# Patient Record
Sex: FEMALE | Race: BLACK OR AFRICAN AMERICAN | NOT HISPANIC OR LATINO | Employment: PART TIME | URBAN - NONMETROPOLITAN AREA
[De-identification: names, ages, dates, MRNs, and addresses within clinical notes are randomized per-mention and may not be internally consistent; named-entity substitution may affect disease eponyms.]

---

## 2017-07-14 ENCOUNTER — LAB (OUTPATIENT)
Dept: LAB | Facility: HOSPITAL | Age: 22
End: 2017-07-14

## 2017-07-14 ENCOUNTER — TRANSCRIBE ORDERS (OUTPATIENT)
Dept: LAB | Facility: HOSPITAL | Age: 22
End: 2017-07-14

## 2017-07-14 DIAGNOSIS — J02.9 ACUTE PHARYNGITIS, UNSPECIFIED ETIOLOGY: Primary | ICD-10-CM

## 2017-07-14 DIAGNOSIS — J02.9 ACUTE PHARYNGITIS, UNSPECIFIED ETIOLOGY: ICD-10-CM

## 2017-07-14 LAB
BASOPHILS # BLD AUTO: 0.02 10*3/MM3 (ref 0–0.2)
BASOPHILS NFR BLD AUTO: 0.2 % (ref 0–2)
DEPRECATED RDW RBC AUTO: 39 FL (ref 36.4–46.3)
EOSINOPHIL # BLD AUTO: 0.02 10*3/MM3 (ref 0–0.7)
EOSINOPHIL NFR BLD AUTO: 0.2 % (ref 0–7)
ERYTHROCYTE [DISTWIDTH] IN BLOOD BY AUTOMATED COUNT: 12.1 % (ref 11.5–14.5)
HCT VFR BLD AUTO: 35.4 % (ref 35–45)
HETEROPH AB SER QL LA: NEGATIVE
HGB BLD-MCNC: 11.9 G/DL (ref 12–15.5)
IMM GRANULOCYTES # BLD: 0.04 10*3/MM3 (ref 0–0.02)
IMM GRANULOCYTES NFR BLD: 0.4 % (ref 0–0.5)
LYMPHOCYTES # BLD AUTO: 1.99 10*3/MM3 (ref 0.6–4.2)
LYMPHOCYTES NFR BLD AUTO: 19.8 % (ref 10–50)
MCH RBC QN AUTO: 29.5 PG (ref 26.5–34)
MCHC RBC AUTO-ENTMCNC: 33.6 G/DL (ref 31.4–36)
MCV RBC AUTO: 87.6 FL (ref 80–98)
MONOCYTES # BLD AUTO: 1.12 10*3/MM3 (ref 0–0.9)
MONOCYTES NFR BLD AUTO: 11.2 % (ref 0–12)
NEUTROPHILS # BLD AUTO: 6.85 10*3/MM3 (ref 2–8.6)
NEUTROPHILS NFR BLD AUTO: 68.2 % (ref 37–80)
PLATELET # BLD AUTO: 237 10*3/MM3 (ref 150–450)
PMV BLD AUTO: 10.4 FL (ref 8–12)
RBC # BLD AUTO: 4.04 10*6/MM3 (ref 3.77–5.16)
WBC NRBC COR # BLD: 10.04 10*3/MM3 (ref 3.2–9.8)

## 2017-07-14 PROCEDURE — 86663 EPSTEIN-BARR ANTIBODY: CPT | Performed by: NURSE PRACTITIONER

## 2017-07-14 PROCEDURE — 85025 COMPLETE CBC W/AUTO DIFF WBC: CPT | Performed by: NURSE PRACTITIONER

## 2017-07-14 PROCEDURE — 36415 COLL VENOUS BLD VENIPUNCTURE: CPT

## 2017-07-14 PROCEDURE — 86308 HETEROPHILE ANTIBODY SCREEN: CPT

## 2017-07-17 LAB — EBV EA IGG SER-ACNC: <9 U/ML (ref 0–8.9)

## 2021-06-17 LAB
C TRACH RRNA SPEC DONR QL NAA+PROBE: NEGATIVE
EXTERNAL ABO GROUPING: NORMAL
EXTERNAL ANTIBODY SCREEN: NORMAL
EXTERNAL HEMATOCRIT: 35 %
EXTERNAL HEMOGLOBIN: 11.5 G/DL
EXTERNAL HEPATITIS B SURFACE ANTIGEN: NEGATIVE
EXTERNAL PLATELET COUNT: 302 K/ΜL
EXTERNAL RH FACTOR: POSITIVE
EXTERNAL SYPHILIS RPR SCREEN: NORMAL
HIV 1+2 AB+HIV1 P24 AG SERPL QL IA: NON REACTIVE
N GONORRHOEA DNA SPEC QL NAA+PROBE: NEGATIVE

## 2021-09-02 ENCOUNTER — LAB (OUTPATIENT)
Dept: LAB | Facility: HOSPITAL | Age: 26
End: 2021-09-02

## 2021-09-02 ENCOUNTER — INITIAL PRENATAL (OUTPATIENT)
Dept: OBSTETRICS AND GYNECOLOGY | Facility: CLINIC | Age: 26
End: 2021-09-02

## 2021-09-02 VITALS
WEIGHT: 293 LBS | HEIGHT: 63 IN | DIASTOLIC BLOOD PRESSURE: 66 MMHG | BODY MASS INDEX: 51.91 KG/M2 | SYSTOLIC BLOOD PRESSURE: 108 MMHG

## 2021-09-02 DIAGNOSIS — Z82.79 FAMILY HISTORY OF CONGENITAL HEART DEFECT: ICD-10-CM

## 2021-09-02 DIAGNOSIS — Z34.02 ENCOUNTER FOR SUPERVISION OF NORMAL FIRST PREGNANCY IN SECOND TRIMESTER: Primary | ICD-10-CM

## 2021-09-02 DIAGNOSIS — Z36.89 ENCOUNTER FOR FETAL ANATOMIC SURVEY: ICD-10-CM

## 2021-09-02 DIAGNOSIS — E66.01 MORBID OBESITY WITH BMI OF 50.0-59.9, ADULT (HCC): ICD-10-CM

## 2021-09-02 LAB
AMPHET+METHAMPHET UR QL: NEGATIVE
AMPHETAMINES UR QL: NEGATIVE
BARBITURATES UR QL SCN: NEGATIVE
BENZODIAZ UR QL SCN: NEGATIVE
BILIRUB UR QL STRIP: NEGATIVE
BUPRENORPHINE SERPL-MCNC: NEGATIVE NG/ML
CANNABINOIDS SERPL QL: NEGATIVE
CLARITY UR: CLEAR
COCAINE UR QL: NEGATIVE
COLOR UR: YELLOW
GLUCOSE UR STRIP-MCNC: NEGATIVE MG/DL
HCV AB SER DONR QL: NORMAL
HGB S BLD QL: NEGATIVE
HGB UR QL STRIP.AUTO: NEGATIVE
KETONES UR QL STRIP: NEGATIVE
LEUKOCYTE ESTERASE UR QL STRIP.AUTO: ABNORMAL
METHADONE UR QL SCN: NEGATIVE
NITRITE UR QL STRIP: NEGATIVE
OPIATES UR QL: NEGATIVE
OXYCODONE UR QL SCN: NEGATIVE
PCP UR QL SCN: NEGATIVE
PH UR STRIP.AUTO: 7 [PH] (ref 5–8)
PROPOXYPH UR QL: NEGATIVE
PROT UR QL STRIP: NEGATIVE
SP GR UR STRIP: 1.02 (ref 1–1.03)
TRICYCLICS UR QL SCN: NEGATIVE
UROBILINOGEN UR QL STRIP: ABNORMAL

## 2021-09-02 PROCEDURE — 36415 COLL VENOUS BLD VENIPUNCTURE: CPT

## 2021-09-02 PROCEDURE — 87086 URINE CULTURE/COLONY COUNT: CPT | Performed by: NURSE PRACTITIONER

## 2021-09-02 PROCEDURE — 85660 RBC SICKLE CELL TEST: CPT | Performed by: NURSE PRACTITIONER

## 2021-09-02 PROCEDURE — 80306 DRUG TEST PRSMV INSTRMNT: CPT | Performed by: NURSE PRACTITIONER

## 2021-09-02 PROCEDURE — 81003 URINALYSIS AUTO W/O SCOPE: CPT | Performed by: NURSE PRACTITIONER

## 2021-09-02 PROCEDURE — 86803 HEPATITIS C AB TEST: CPT | Performed by: NURSE PRACTITIONER

## 2021-09-02 RX ORDER — PRENATAL VIT/IRON FUM/FOLIC AC 27MG-0.8MG
1 TABLET ORAL DAILY
COMMUNITY
Start: 2021-08-28 | End: 2022-03-08

## 2021-09-02 NOTE — PROGRESS NOTES
I spent approximately 60 minutes with the patient acquiring the health and history intake, reviewing her prenatal records from another facility, and discussing topics related to healthy lifestyle. She transferred prenatal care from the [facility] in [place]. This is her 1st pregnancy. She had her labs and ultrasounds done there. Her EDC is 1/18/22. Not all of the anatomy was seen on her anatomy scan. We will schedule her for an anatomy scan here at our facility.   A newob bag is given. We discussed a healthy diet and exercise and what is recommended. I also discussed Listeriosis and Toxoplasmosis and what fish to avoid due to high mercury levels. She is taking prenatal vitamins.  I informed patient not to be in hot tubs, saunas, or tanning beds. We discussed that spotting may occur after intercourse which is common, but if heavy bleeding like a period occurs to [facility] if clinic is closed.  I encouraged her to make an appointment with the dentist if she has not had a dental exam and cleaning in the last 6 months. The patient denies use of alcohol, illicit drug use, and tobacco smoking. She drank alcohol occasionally prior to pregnancy. She plans to breastfeed.  I gave her pamphlet on breastfeeding classes and the breastfeeding mothers support group. These services are provided by Angelique Gongora, Lactation Consultant. She is in the HANDS program at the health department. I told her they also have a breastfeeding peer counselor at the health department. She filled out the depression screening questionnaire and scored 12. She says she does have anxiety.  I encouraged the patient to get the TDAP vaccine in the 3rd trimester.  I discussed with the patient that a pediatrician needs to be chosen prior to delivery for the infant to have an appointment scheduled before leaving the hospital.  I discussed some lab tests will be done today. An early 1 hr glucose is also ordered. Her  last pap smear was negative on 6/17/21.  All questions were answered at this time. She is scheduled for an anatomy ultrasound and to see Violet StanleyMani IRIS on 9/16/21.

## 2021-09-03 LAB — BACTERIA SPEC AEROBE CULT: NORMAL

## 2021-09-16 ENCOUNTER — INITIAL PRENATAL (OUTPATIENT)
Dept: OBSTETRICS AND GYNECOLOGY | Facility: CLINIC | Age: 26
End: 2021-09-16

## 2021-09-16 VITALS — WEIGHT: 293 LBS | SYSTOLIC BLOOD PRESSURE: 132 MMHG | DIASTOLIC BLOOD PRESSURE: 78 MMHG | BODY MASS INDEX: 54.38 KG/M2

## 2021-09-16 DIAGNOSIS — Z3A.23 23 WEEKS GESTATION OF PREGNANCY: Primary | ICD-10-CM

## 2021-09-16 DIAGNOSIS — O99.212 MATERNAL MORBID OBESITY IN SECOND TRIMESTER, ANTEPARTUM (HCC): ICD-10-CM

## 2021-09-16 DIAGNOSIS — E66.01 MATERNAL MORBID OBESITY IN SECOND TRIMESTER, ANTEPARTUM (HCC): ICD-10-CM

## 2021-09-16 DIAGNOSIS — O09.92 SUPERVISION OF HIGH RISK PREGNANCY IN SECOND TRIMESTER: ICD-10-CM

## 2021-09-16 PROCEDURE — 0501F PRENATAL FLOW SHEET: CPT | Performed by: NURSE PRACTITIONER

## 2021-09-21 ENCOUNTER — LAB (OUTPATIENT)
Dept: LAB | Facility: HOSPITAL | Age: 26
End: 2021-09-21

## 2021-09-21 DIAGNOSIS — Z34.02 ENCOUNTER FOR SUPERVISION OF NORMAL FIRST PREGNANCY IN SECOND TRIMESTER: ICD-10-CM

## 2021-09-21 DIAGNOSIS — E66.01 MORBID OBESITY WITH BMI OF 50.0-59.9, ADULT (HCC): ICD-10-CM

## 2021-09-21 DIAGNOSIS — O99.810 ABNORMAL GLUCOSE IN PREGNANCY, ANTEPARTUM: Primary | ICD-10-CM

## 2021-09-21 PROBLEM — O09.92 SUPERVISION OF HIGH RISK PREGNANCY IN SECOND TRIMESTER: Status: ACTIVE | Noted: 2021-09-21

## 2021-09-21 PROBLEM — O99.212 MATERNAL MORBID OBESITY IN SECOND TRIMESTER, ANTEPARTUM: Status: ACTIVE | Noted: 2021-09-21

## 2021-09-21 LAB — GLUCOSE 1H P 100 G GLC PO SERPL-MCNC: 148 MG/DL (ref 65–139)

## 2021-09-21 PROCEDURE — 36415 COLL VENOUS BLD VENIPUNCTURE: CPT

## 2021-09-21 PROCEDURE — 82950 GLUCOSE TEST: CPT

## 2021-09-22 NOTE — PROGRESS NOTES
[facility]  Obstetrics Visit    CHIEF COMPLAINT:  New prenatal visit    HISTORY OF PRESENT ILLNESS:  Denise Rod is a 26 y.o. y/o  at 22w2d by LMP (Patient's last menstrual period was 2021 (exact date).  This was a planned pregnancy and the patient is supported by her mother today.  She denies any vaginal bleeding.  She has started taking a prenatal vitamin.    REVIEW OF SYSTEMS  Review of Systems   Constitutional: Negative for activity change, appetite change, chills, diaphoresis, fatigue, fever, unexpected weight gain and unexpected weight loss.   Respiratory: Negative for apnea, chest tightness and shortness of breath.    Cardiovascular: Negative for chest pain, palpitations and leg swelling.   Gastrointestinal: Negative for abdominal distention, abdominal pain, constipation and diarrhea.   Genitourinary: Negative for amenorrhea, breast discharge, breast lump, breast pain, decreased libido, dyspareunia, dysuria, menstrual problem, pelvic pain, vaginal bleeding, vaginal discharge and vaginal pain.   Musculoskeletal: Negative for myalgias.   Skin: Negative for color change, dry skin and skin lesions.   Neurological: Negative for light-headedness and headache.   Psychiatric/Behavioral: Negative for agitation, dysphoric mood, sleep disturbance, depressed mood and stress. The patient is not nervous/anxious.        PRENATAL RISK FACTORS   Problems (from 21 to present)     Problem Noted Resolved    Maternal morbid obesity in second trimester, antepartum (CMS/[facility]) 2021 by Violet Singleton APRN No    Supervision of high risk pregnancy in second trimester 2021 by Violet Singleton APRN No          DATING CRITERIA:  LMP (2021) -- ARMINDA 2022  1TUS (2021 at 9w2d) -- ARMINDA 2022    OBSTETRIC HISTORY:  OB History    Para Term  AB Living   1             SAB TAB Ectopic Molar Multiple Live Births                    #  Outcome Date GA Lbr Sagar/2nd Weight Sex Delivery Anes PTL Lv   1 Current              GYN HISTORY:  Denies h/o sexually transmitted infections/pelvic inflammatory disease  Denies h/o abnormal pap smears  Last pap smear:   Last Completed Pap Smear     NIL 06/17/2021        Denies h/o gynecologic surgeries, including biopsies of the cervix    PAST MEDICAL HISTORY:  Past Medical History:   Diagnosis Date   • Anxiety      PAST SURGICAL HISTORY:  Past Surgical History:   Procedure Laterality Date   • FOOT FRACTURE SURGERY  2021   • KNEE CARTILAGE SURGERY  2014     FAMILY HISTORY:  Family History   Problem Relation Age of Onset   • Hypertension Mother    • Hypertension Maternal Grandmother    • Diabetes Maternal Grandmother    • Depression Maternal Grandmother      SOCIAL HISTORY:  Social History     Socioeconomic History   • Marital status: Single     Spouse name: Not on file   • Number of children: Not on file   • Years of education: Not on file   • Highest education level: Not on file   Tobacco Use   • Smoking status: Never Smoker   • Smokeless tobacco: Never Used   Substance and Sexual Activity   • Alcohol use: Not Currently   • Drug use: Never   • Sexual activity: Yes     Partners: Male     Comment: last pap smear 6/17/21 negative at [facility]      GENETIC SCREENING:  Age >34 yo as of ARMINDA: no  Thalassemia: no  NTD: no  CHD: no  Down Syndrome/MR/Fragile X/Autism: no  Ashkenazi Jain with Abdelrahman-Sachs, [place], familial dysautonomia: no  Sickle cell disease or trait: no  Hemophilia: no  Muscular dystrophy: no  Cystic fibrosis: no  [place]'s chorea: no  Birth defects: no  Genetic/chromosomal disorders: no    INFECTION HISTORY:  TB exposure: no  HSV: no  Illness since LMP: no  Prior GBS infected child: no  STIs: no    ALLERGIES:  No Known Allergies    MEDICATIONS:  Prior to Admission medications    Medication Sig Start Date End Date Taking? Authorizing Provider   Prenatal Vit-Fe Fumarate-FA (prenatal vitamin 27-0.8)  27-0.8 MG tablet tablet Take 1 tablet by mouth Daily. 21  Yes Provider, Historical, MD       PHYSICAL EXAM:   /78   Wt (!) 139 kg (307 lb)   LMP 2021 (Exact Date)   BMI 54.38 kg/m²   General: Alert, healthy, no distress, well nourished and well developed.  Neurologic: Alert, oriented to person, place, and time.  Gait normal.  Cranial nerves II-XII grossly intact.  HEENT: Normocephalic, atraumatic.  Extraocular muscles intact, pupils equal and reactive x2.    Teeth: Normal hygiene.  Neck: Supple, no adenopathy, thyroid normal size, non-tender, without nodularity, trachea midline.  Breasts: No masses, skin dimpling, skin retraction, nipple discharge, or asymmetry bilaterally.  Lungs: Normal respiratory effort.  Clear to auscultation bilaterally.  No wheezes, rhonci, or rales.  Heart: Regular rate and rhythm.  No murmer, rub or gallop.  Abdomen: Soft, non-tender, non-distended,no masses, no hepatosplenomegaly, no hernia.  Skin: No rash, no lesions.  Extremities: No cyanosis, clubbing or edema.      Pt previous anatomy scan in [place] sub optimal views of heart.      Prelim anatomy scan from today-  g at 41%tile, AC 36 %tile, placenta posterior, CL 3.81 cm, all views of heart obtained     Discussed between two scans all fetal anatomy seen.  Pt and mother fine with not repeating scan for sub optimal views for this reason.  Due to maternal BMI will plan to repeat scan for fetal growth in 3T.      IMPRESSION:  Denise Rod is a 26 y.o.  at 22w2d for a new prenatal visit.    PLAN:  1.  IUP at 22w2d  - Options counseling performed and patient desires continuation of pregnancy to term   - Prenatal labs ordered  - NIPS negative- female done at    - Continue prenatal vitamins  - Weight gain counseling performed.   - Pregravid BMI >30: Recommend 11-20 lb  - Return to clinic in 4 weeks for return prenatal visit and fetal growth scan   - Reviewed COVID-19 visitation policy  -  Reviewed COVID-19 precautions     Diagnosis Plan   1. 23 weeks gestation of pregnancy     2. Supervision of high risk pregnancy in second trimester     3. Maternal morbid obesity in second trimester, antepartum (CMS/HCC)  US Ob Follow Up Transabdominal Approach     IRIS Joy  9/22/2021  08:59 CDT

## 2021-09-27 ENCOUNTER — LAB (OUTPATIENT)
Dept: LAB | Facility: HOSPITAL | Age: 26
End: 2021-09-27

## 2021-09-27 DIAGNOSIS — O99.810 ABNORMAL GLUCOSE IN PREGNANCY, ANTEPARTUM: ICD-10-CM

## 2021-09-27 LAB
GLUCOSE P FAST SERPL-MCNC: 90 MG/DL (ref 65–94)
GTT GEST 2H PNL UR+SERPL: 169 MG/DL (ref 65–179)
GTT GEST 3H PNL SERPL: 137 MG/DL (ref 65–154)
GTT GEST 3H PNL SERPL: 60 MG/DL (ref 65–139)

## 2021-09-27 PROCEDURE — 36415 COLL VENOUS BLD VENIPUNCTURE: CPT

## 2021-09-27 PROCEDURE — 82952 GTT-ADDED SAMPLES: CPT

## 2021-09-27 PROCEDURE — 82951 GLUCOSE TOLERANCE TEST (GTT): CPT

## 2021-10-14 ENCOUNTER — ROUTINE PRENATAL (OUTPATIENT)
Dept: OBSTETRICS AND GYNECOLOGY | Facility: CLINIC | Age: 26
End: 2021-10-14

## 2021-10-14 VITALS — SYSTOLIC BLOOD PRESSURE: 120 MMHG | BODY MASS INDEX: 54.21 KG/M2 | WEIGHT: 293 LBS | DIASTOLIC BLOOD PRESSURE: 72 MMHG

## 2021-10-14 DIAGNOSIS — Z3A.26 26 WEEKS GESTATION OF PREGNANCY: Primary | ICD-10-CM

## 2021-10-14 DIAGNOSIS — O99.212 MATERNAL MORBID OBESITY IN SECOND TRIMESTER, ANTEPARTUM (HCC): ICD-10-CM

## 2021-10-14 DIAGNOSIS — O09.92 SUPERVISION OF HIGH RISK PREGNANCY IN SECOND TRIMESTER: ICD-10-CM

## 2021-10-14 DIAGNOSIS — E66.01 MATERNAL MORBID OBESITY IN SECOND TRIMESTER, ANTEPARTUM (HCC): ICD-10-CM

## 2021-10-14 PROCEDURE — 0502F SUBSEQUENT PRENATAL CARE: CPT | Performed by: OBSTETRICS & GYNECOLOGY

## 2021-10-14 NOTE — PROGRESS NOTES
CC: Prenatal visit    Denise Rod is a 26 y.o.  at 26w2d.  Doing well.  Denies contractions, LOF, or VB.  Reports good FM.  Patient had growth scan today I reviewed the preliminary report myself.  Growth at the 20th percentile abdominal circumference at the 21st percentile.  Suboptimal views were not obtained secondary to body habitus.    /72   Wt (!) 139 kg (306 lb)   LMP 2021 (Exact Date)   BMI 54.21 kg/m²        Fetal Heart Rate: 158     Problems (from 21 to present)     Problem Noted Resolved    Maternal morbid obesity in second trimester, antepartum ([facility]) 2021 by Violet Singleton APRN No    Supervision of high risk pregnancy in second trimester 2021 by Violet Singleton APRN No          A/P: Denise Rod is a 26 y.o.  at 26w2d.  Supervision high risk pregnancy secondary to maternal obesity and anxiety overall stable doing well  -Bleeding cramping precautions  -Repeat growth at 32 weeks  -We will discuss Tdap at next visit  -Reviewed 28-week labs with 3-hour Glucola that was normal  -I reviewed new OB labs from this patient which were reassuring  -I have reviewed anatomy ultrasound which had suboptimal views but was overall reassuring.  - RTC in 4 weeks  - Reviewed COVID-19 visitation policy  - Reviewed COVID-19 precautions     Diagnosis Plan   1. 26 weeks gestation of pregnancy     2. Supervision of high risk pregnancy in second trimester     3. Maternal morbid obesity in second trimester, antepartum ([facility])       Clayton Nicole DO  10/14/2021  11:53 CDT

## 2021-11-16 ENCOUNTER — ROUTINE PRENATAL (OUTPATIENT)
Dept: OBSTETRICS AND GYNECOLOGY | Facility: CLINIC | Age: 26
End: 2021-11-16

## 2021-11-16 VITALS — WEIGHT: 293 LBS | SYSTOLIC BLOOD PRESSURE: 132 MMHG | DIASTOLIC BLOOD PRESSURE: 80 MMHG | BODY MASS INDEX: 55.45 KG/M2

## 2021-11-16 DIAGNOSIS — O99.212 MATERNAL MORBID OBESITY IN SECOND TRIMESTER, ANTEPARTUM (HCC): ICD-10-CM

## 2021-11-16 DIAGNOSIS — E66.01 MATERNAL MORBID OBESITY IN SECOND TRIMESTER, ANTEPARTUM (HCC): ICD-10-CM

## 2021-11-16 DIAGNOSIS — O09.92 SUPERVISION OF HIGH RISK PREGNANCY IN SECOND TRIMESTER: ICD-10-CM

## 2021-11-16 DIAGNOSIS — Z3A.31 31 WEEKS GESTATION OF PREGNANCY: Primary | ICD-10-CM

## 2021-11-16 PROCEDURE — 0502F SUBSEQUENT PRENATAL CARE: CPT | Performed by: OBSTETRICS & GYNECOLOGY

## 2021-11-16 RX ORDER — CETIRIZINE HYDROCHLORIDE 5 MG/1
5 TABLET ORAL DAILY
Qty: 30 TABLET | Refills: 2 | Status: SHIPPED | OUTPATIENT
Start: 2021-11-16 | End: 2022-11-14

## 2021-11-16 NOTE — PROGRESS NOTES
CC: Prenatal visit    Denise Rod is a 26 y.o.  at 31w0d.  Doing well.  Denies contractions, LOF, or VB.  Reports good FM.  Patient complaining of some increased sinus pain and pressure.  She feels that this may be allergies, prescription for Zyrtec placed.    /80   Wt (!) 142 kg (313 lb)   LMP 2021 (Exact Date)   BMI 55.45 kg/m²     Fundal Height (cm): 31 cm  Fetal Heart Rate: 145     Problems (from 21 to present)     Problem Noted Resolved    Maternal morbid obesity in second trimester, antepartum (HCC) 2021 by Violet Singleton, IRIS No    Supervision of high risk pregnancy in second trimester 2021 by Violet Singleton, IRIS No          A/P: Denise Rod is a 26 y.o.  at 31w0d.  Supervision of high-risk pregnancy secondary to maternal obesity, overall doing well and progressing appropriately at this time.  Repeat ultrasound for growth in 2 weeks at maternal fetal medicine recommendation.  Fetal kick count  labor precautions given, return to see me in 2 weeks.  Discussed Tdap again today.  Patient would like to wait 2 more weeks prior to getting the shot.  Patient did inquire about Covid booster I did explain to her that this was safe in pregnancy.  - RTC in 2 weeks  - Reviewed COVID-19 visitation policy  - Reviewed COVID-19 precautions     Diagnosis Plan   1. 31 weeks gestation of pregnancy     2. Maternal morbid obesity in second trimester, antepartum (HCC)  US Ob Follow Up Transabdominal Approach   3. Supervision of high risk pregnancy in second trimester  US Ob Follow Up Transabdominal Approach     Clayton Nicole DO  2021  10:11 CST

## 2021-11-30 ENCOUNTER — ROUTINE PRENATAL (OUTPATIENT)
Dept: OBSTETRICS AND GYNECOLOGY | Facility: CLINIC | Age: 26
End: 2021-11-30

## 2021-11-30 VITALS — SYSTOLIC BLOOD PRESSURE: 112 MMHG | BODY MASS INDEX: 55.45 KG/M2 | DIASTOLIC BLOOD PRESSURE: 68 MMHG | WEIGHT: 293 LBS

## 2021-11-30 DIAGNOSIS — E66.01 MATERNAL MORBID OBESITY IN SECOND TRIMESTER, ANTEPARTUM (HCC): ICD-10-CM

## 2021-11-30 DIAGNOSIS — Z23 NEED FOR TDAP VACCINATION: ICD-10-CM

## 2021-11-30 DIAGNOSIS — O09.92 SUPERVISION OF HIGH RISK PREGNANCY IN SECOND TRIMESTER: ICD-10-CM

## 2021-11-30 DIAGNOSIS — O99.212 MATERNAL MORBID OBESITY IN SECOND TRIMESTER, ANTEPARTUM (HCC): ICD-10-CM

## 2021-11-30 DIAGNOSIS — Z3A.33 33 WEEKS GESTATION OF PREGNANCY: Primary | ICD-10-CM

## 2021-11-30 PROCEDURE — 90471 IMMUNIZATION ADMIN: CPT | Performed by: OBSTETRICS & GYNECOLOGY

## 2021-11-30 PROCEDURE — 90715 TDAP VACCINE 7 YRS/> IM: CPT | Performed by: OBSTETRICS & GYNECOLOGY

## 2021-11-30 PROCEDURE — 0502F SUBSEQUENT PRENATAL CARE: CPT | Performed by: OBSTETRICS & GYNECOLOGY

## 2021-11-30 NOTE — PROGRESS NOTES
CC: Prenatal visit    Denise Rod is a 26 y.o.  at 33w0d.  Doing well.  Denies contractions, LOF, or VB.  Reports good FM.  Reviewed preliminary report from growth scan today overall reassuring with growth at 38.4%.  CYNDI at 17.  Had down overall very reassuring    /68   Wt (!) 142 kg (313 lb)   LMP 2021 (Exact Date)   BMI 55.45 kg/m²     Fundal Height (cm): 33 cm  Fetal Heart Rate: 139     Problems (from 21 to present)     Problem Noted Resolved    Maternal morbid obesity in second trimester, antepartum (HCC) 2021 by Violet Singleton APRN No    Supervision of high risk pregnancy in second trimester 2021 by Violet Singleton APRN No          A/P: Denise Rod is a 26 y.o.  at 33w0d.  Doing well with appropriately progressing pregnancy at this time.  Reassuring growth scan today.  Patient to return to see me in 2 weeks, sooner as needed.  Fetal kick count and  labor precautions given.  - RTC in 2 weeks  - Reviewed COVID-19 visitation policy  - Reviewed COVID-19 precautions     Diagnosis Plan   1. 33 weeks gestation of pregnancy     2. Supervision of high risk pregnancy in second trimester     3. Maternal morbid obesity in second trimester, antepartum (HCC)     4. Need for Tdap vaccination  Tdap Vaccine Greater Than or Equal To 8yo CHARLENE Nicole DO  2021  14:00 CST

## 2021-12-14 ENCOUNTER — ROUTINE PRENATAL (OUTPATIENT)
Dept: OBSTETRICS AND GYNECOLOGY | Facility: CLINIC | Age: 26
End: 2021-12-14

## 2021-12-14 VITALS — WEIGHT: 293 LBS | BODY MASS INDEX: 55.98 KG/M2 | DIASTOLIC BLOOD PRESSURE: 74 MMHG | SYSTOLIC BLOOD PRESSURE: 118 MMHG

## 2021-12-14 DIAGNOSIS — O99.212 MATERNAL MORBID OBESITY IN SECOND TRIMESTER, ANTEPARTUM (HCC): ICD-10-CM

## 2021-12-14 DIAGNOSIS — E66.01 MATERNAL MORBID OBESITY IN SECOND TRIMESTER, ANTEPARTUM (HCC): ICD-10-CM

## 2021-12-14 DIAGNOSIS — Z36.85 ENCOUNTER FOR ANTENATAL SCREENING FOR STREPTOCOCCUS B: ICD-10-CM

## 2021-12-14 DIAGNOSIS — Z3A.35 35 WEEKS GESTATION OF PREGNANCY: Primary | ICD-10-CM

## 2021-12-14 DIAGNOSIS — O09.92 SUPERVISION OF HIGH RISK PREGNANCY IN SECOND TRIMESTER: ICD-10-CM

## 2021-12-14 PROCEDURE — 99212 OFFICE O/P EST SF 10 MIN: CPT | Performed by: OBSTETRICS & GYNECOLOGY

## 2021-12-14 PROCEDURE — 87653 STREP B DNA AMP PROBE: CPT | Performed by: OBSTETRICS & GYNECOLOGY

## 2021-12-14 NOTE — PROGRESS NOTES
CC: Prenatal visit    Denise Rod is a 26 y.o.  at 35w0d.  Doing well.  Denies contractions, LOF, or VB.  Reports good FM.    /74   Wt (!) 143 kg (316 lb)   LMP 2021 (Exact Date)   BMI 55.98 kg/m²   SVE: GBS collected today  Fundal Height (cm): 35 cm  Fetal Heart Rate: 140     Problems (from 21 to present)     Problem Noted Resolved    Maternal morbid obesity in second trimester, antepartum (HCC) 2021 by Violet Singleton, IRIS No    Supervision of high risk pregnancy in second trimester 2021 by Violet Singleton, IRIS No          A/P: Denise Rod is a 26 y.o.  at 35w0d.  Supervision of high-risk pregnancy secondary to maternal obesity overall stable doing well.  Fetal kick count and  labor precautions given.  Patient return in 2 weeks, sooner as needed.  - RTC in 2 weeks  - Reviewed COVID-19 visitation policy  - Reviewed COVID-19 precautions     Diagnosis Plan   1. 35 weeks gestation of pregnancy     2. Maternal morbid obesity in second trimester, antepartum (HCC)     3. Supervision of high risk pregnancy in second trimester       Clayton Nicole DO  2021  09:11 CST

## 2021-12-15 LAB — GROUP B STREP, DNA: POSITIVE

## 2021-12-28 ENCOUNTER — ROUTINE PRENATAL (OUTPATIENT)
Dept: OBSTETRICS AND GYNECOLOGY | Facility: CLINIC | Age: 26
End: 2021-12-28

## 2021-12-28 VITALS — DIASTOLIC BLOOD PRESSURE: 80 MMHG | BODY MASS INDEX: 56.86 KG/M2 | WEIGHT: 293 LBS | SYSTOLIC BLOOD PRESSURE: 128 MMHG

## 2021-12-28 DIAGNOSIS — R19.5 HARD STOOL: ICD-10-CM

## 2021-12-28 DIAGNOSIS — O09.92 SUPERVISION OF HIGH RISK PREGNANCY IN SECOND TRIMESTER: ICD-10-CM

## 2021-12-28 DIAGNOSIS — E66.01 MATERNAL MORBID OBESITY IN SECOND TRIMESTER, ANTEPARTUM (HCC): ICD-10-CM

## 2021-12-28 DIAGNOSIS — O99.212 MATERNAL MORBID OBESITY IN SECOND TRIMESTER, ANTEPARTUM (HCC): ICD-10-CM

## 2021-12-28 DIAGNOSIS — Z3A.37 37 WEEKS GESTATION OF PREGNANCY: Primary | ICD-10-CM

## 2021-12-28 PROCEDURE — 99213 OFFICE O/P EST LOW 20 MIN: CPT | Performed by: NURSE PRACTITIONER

## 2021-12-28 RX ORDER — DOCUSATE SODIUM 100 MG/1
100 CAPSULE, LIQUID FILLED ORAL 2 TIMES DAILY
Qty: 60 CAPSULE | Refills: 11 | Status: ON HOLD | OUTPATIENT
Start: 2021-12-28 | End: 2022-01-23 | Stop reason: SDUPTHER

## 2021-12-28 NOTE — PROGRESS NOTES
CC: Prenatal visit    Denise Rod is a 26 y.o.  at 37w0d.  Doing well.  Pt desires to start on stool softener to help prevent hemorrhoids, bowel movements are regular but at times stool is hard.  She reports some BH ctx.  Denies painful regular contractions, LOF, or VB.  Reports good FM.    /80   Wt (!) 146 kg (321 lb)   LMP 2021 (Exact Date)   BMI 56.86 kg/m²              Problems (from 21 to present)     Problem Noted Resolved    Maternal morbid obesity in second trimester, antepartum (HCC) 2021 by Violet Singleton APRN No    Supervision of high risk pregnancy in second trimester 2021 by Violet Singleton APRN No          A/P: Denise Rod is a 26 y.o.  at 37w0d.  Discussed L/D, answered pt questions  - RTC in 1 weeks     Diagnosis Plan   1. 37 weeks gestation of pregnancy     2. Supervision of high risk pregnancy in second trimester     3. Maternal morbid obesity in second trimester, antepartum (HCC)     4. Hard stool  docusate sodium (Colace) 100 MG capsule       IRIS Joy  2021  11:14 CST

## 2022-01-05 ENCOUNTER — ROUTINE PRENATAL (OUTPATIENT)
Dept: OBSTETRICS AND GYNECOLOGY | Facility: CLINIC | Age: 27
End: 2022-01-05

## 2022-01-05 VITALS — BODY MASS INDEX: 56.15 KG/M2 | WEIGHT: 293 LBS | DIASTOLIC BLOOD PRESSURE: 82 MMHG | SYSTOLIC BLOOD PRESSURE: 122 MMHG

## 2022-01-05 DIAGNOSIS — O99.212 MATERNAL MORBID OBESITY IN SECOND TRIMESTER, ANTEPARTUM: ICD-10-CM

## 2022-01-05 DIAGNOSIS — O09.92 SUPERVISION OF HIGH RISK PREGNANCY IN SECOND TRIMESTER: ICD-10-CM

## 2022-01-05 DIAGNOSIS — O36.63X0 ENCOUNTER FOR MATERNAL CARE FOR EXCESSIVE FETAL GROWTH IN THIRD TRIMESTER, SINGLE OR UNSPECIFIED FETUS: ICD-10-CM

## 2022-01-05 DIAGNOSIS — Z3A.38 38 WEEKS GESTATION OF PREGNANCY: Primary | ICD-10-CM

## 2022-01-05 DIAGNOSIS — E66.01 MATERNAL MORBID OBESITY IN SECOND TRIMESTER, ANTEPARTUM: ICD-10-CM

## 2022-01-05 PROCEDURE — 99212 OFFICE O/P EST SF 10 MIN: CPT | Performed by: OBSTETRICS & GYNECOLOGY

## 2022-01-05 NOTE — PROGRESS NOTES
CC: Prenatal visit    Denise Rod is a 26 y.o.  at 38w1d.  Doing well.  Denies contractions, LOF, or VB.  Reports good FM.  Discussed induction of labor with patient she does not want induction at this time.  Patient is measuring size greater than dates will get growth scan next week.  No other concerns or complaints at this time    /82   Wt (!) 144 kg (317 lb)   LMP 2021 (Exact Date)   BMI 56.15 kg/m²   SVE: Declined  Fundal Height (cm): 40 cm  Fetal Heart Rate: 140     Problems (from 21 to present)     Problem Noted Resolved    Maternal morbid obesity in second trimester, antepartum (HCC) 2021 by Violet Singleton APRN No    Supervision of high risk pregnancy in second trimester 2021 by Violet Singleton APRN No          A/P: Denise Rod is a 26 y.o.  at 38w1d.  Supervision high risk pregnancy secondary to maternal obesity overall doing well and progressing appropriately at this time.  Return to see me in 1 week, sooner as needed.  Fetal kick count labor precautions given.  - RTC in 1 weeks  - Reviewed COVID-19 visitation policy  - Reviewed COVID-19 precautions     Diagnosis Plan   1. 38 weeks gestation of pregnancy     2. Maternal morbid obesity in second trimester, antepartum (HCC)     3. Supervision of high risk pregnancy in second trimester     4. Encounter for maternal care for excessive fetal growth in third trimester, single or unspecified fetus  US Ob Follow Up Transabdominal Approach     Clayton Nicole DO  2022  10:25 CST

## 2022-01-12 ENCOUNTER — ROUTINE PRENATAL (OUTPATIENT)
Dept: OBSTETRICS AND GYNECOLOGY | Facility: CLINIC | Age: 27
End: 2022-01-12

## 2022-01-12 ENCOUNTER — HOSPITAL ENCOUNTER (OUTPATIENT)
Dept: ULTRASOUND IMAGING | Facility: HOSPITAL | Age: 27
Discharge: HOME | End: 2022-01-12
Admitting: OBSTETRICS & GYNECOLOGY

## 2022-01-12 VITALS — BODY MASS INDEX: 57.39 KG/M2 | DIASTOLIC BLOOD PRESSURE: 82 MMHG | WEIGHT: 293 LBS | SYSTOLIC BLOOD PRESSURE: 126 MMHG

## 2022-01-12 DIAGNOSIS — E66.01 MATERNAL MORBID OBESITY IN SECOND TRIMESTER, ANTEPARTUM: ICD-10-CM

## 2022-01-12 DIAGNOSIS — O09.92 SUPERVISION OF HIGH RISK PREGNANCY IN SECOND TRIMESTER: ICD-10-CM

## 2022-01-12 DIAGNOSIS — Z3A.39 39 WEEKS GESTATION OF PREGNANCY: Primary | ICD-10-CM

## 2022-01-12 DIAGNOSIS — O99.212 MATERNAL MORBID OBESITY IN SECOND TRIMESTER, ANTEPARTUM: ICD-10-CM

## 2022-01-12 DIAGNOSIS — O36.63X0 ENCOUNTER FOR MATERNAL CARE FOR EXCESSIVE FETAL GROWTH IN THIRD TRIMESTER, SINGLE OR UNSPECIFIED FETUS: ICD-10-CM

## 2022-01-12 PROCEDURE — 99214 OFFICE O/P EST MOD 30 MIN: CPT | Performed by: OBSTETRICS & GYNECOLOGY

## 2022-01-12 PROCEDURE — 76816 OB US FOLLOW-UP PER FETUS: CPT

## 2022-01-15 NOTE — PROGRESS NOTES
CC: Prenatal visit    Denise Rod is a 26 y.o.  at 39w3d.  Doing well.  Denies contractions, LOF, or VB.  Reports good FM.  Reviewed preliminary report from ultrasound.  Growth at the 24th percentile.  CYNDI appropriate.    /82   Wt (!) 147 kg (324 lb)   LMP 2021 (Exact Date)   BMI 57.39 kg/m²     Fundal Height (cm): 40 cm  Fetal Heart Rate: 137     Problems (from 21 to present)     Problem Noted Resolved    Maternal morbid obesity in second trimester, antepartum (HCC) 2021 by Violet Singleton, IRIS No    Supervision of high risk pregnancy in second trimester 2021 by Violet Singleton APRN No          A/P: Denise Rod is a 26 y.o.  at 39w3d.  Provisional high risk pregnancy secondary to maternal obesity and anxiety.   Overall stable doing well.  Reassuring ultrasound today with appropriate fetal growth.  Patient to return in 1 week.  Sooner as needed.  Patient does not desire induction at this time.  - RTC in 1 weeks  - Reviewed COVID-19 visitation policy  - Reviewed COVID-19 precautions     Diagnosis Plan   1. Maternal morbid obesity in second trimester, antepartum (HCC)     2. Supervision of high risk pregnancy in second trimester       Clayton Nicole DO  2022  21:20 CST

## 2022-01-18 ENCOUNTER — HOSPITAL ENCOUNTER (INPATIENT)
Facility: HOSPITAL | Age: 27
LOS: 5 days | Discharge: HOME | End: 2022-01-23
Attending: OBSTETRICS & GYNECOLOGY | Admitting: OBSTETRICS & GYNECOLOGY

## 2022-01-18 ENCOUNTER — ROUTINE PRENATAL (OUTPATIENT)
Dept: OBSTETRICS AND GYNECOLOGY | Facility: CLINIC | Age: 27
End: 2022-01-18

## 2022-01-18 VITALS — DIASTOLIC BLOOD PRESSURE: 90 MMHG | BODY MASS INDEX: 57.04 KG/M2 | SYSTOLIC BLOOD PRESSURE: 142 MMHG | WEIGHT: 293 LBS

## 2022-01-18 DIAGNOSIS — O99.212 MATERNAL MORBID OBESITY IN SECOND TRIMESTER, ANTEPARTUM: Primary | ICD-10-CM

## 2022-01-18 DIAGNOSIS — O16.9 ELEVATED BLOOD PRESSURE AFFECTING PREGNANCY, ANTEPARTUM: ICD-10-CM

## 2022-01-18 DIAGNOSIS — E66.01 MATERNAL MORBID OBESITY IN SECOND TRIMESTER, ANTEPARTUM: Primary | ICD-10-CM

## 2022-01-18 DIAGNOSIS — O09.92 SUPERVISION OF HIGH RISK PREGNANCY IN SECOND TRIMESTER: ICD-10-CM

## 2022-01-18 DIAGNOSIS — Z3A.40 40 WEEKS GESTATION OF PREGNANCY: ICD-10-CM

## 2022-01-18 DIAGNOSIS — O99.212 MATERNAL MORBID OBESITY IN SECOND TRIMESTER, ANTEPARTUM: ICD-10-CM

## 2022-01-18 DIAGNOSIS — E66.01 MATERNAL MORBID OBESITY IN SECOND TRIMESTER, ANTEPARTUM: ICD-10-CM

## 2022-01-18 DIAGNOSIS — R19.5 HARD STOOL: ICD-10-CM

## 2022-01-18 PROBLEM — O16.3 ELEVATED BLOOD PRESSURE AFFECTING PREGNANCY IN THIRD TRIMESTER, ANTEPARTUM: Status: ACTIVE | Noted: 2022-01-18

## 2022-01-18 LAB
ABO GROUP BLD: NORMAL
AMPHET+METHAMPHET UR QL: NEGATIVE
AMPHETAMINES UR QL: NEGATIVE
B PARAPERT DNA SPEC QL NAA+PROBE: NOT DETECTED
B PERT DNA SPEC QL NAA+PROBE: NOT DETECTED
BARBITURATES UR QL SCN: NEGATIVE
BENZODIAZ UR QL SCN: NEGATIVE
BLD GP AB SCN SERPL QL: NEGATIVE
BUPRENORPHINE SERPL-MCNC: NEGATIVE NG/ML
C PNEUM DNA NPH QL NAA+NON-PROBE: NOT DETECTED
CANNABINOIDS SERPL QL: NEGATIVE
COCAINE UR QL: NEGATIVE
DEPRECATED RDW RBC AUTO: 40 FL (ref 37–54)
ERYTHROCYTE [DISTWIDTH] IN BLOOD BY AUTOMATED COUNT: 12.9 % (ref 12.3–15.4)
FLUAV SUBTYP SPEC NAA+PROBE: NOT DETECTED
FLUBV RNA ISLT QL NAA+PROBE: NOT DETECTED
HADV DNA SPEC NAA+PROBE: NOT DETECTED
HCOV 229E RNA SPEC QL NAA+PROBE: NOT DETECTED
HCOV HKU1 RNA SPEC QL NAA+PROBE: NOT DETECTED
HCOV NL63 RNA SPEC QL NAA+PROBE: NOT DETECTED
HCOV OC43 RNA SPEC QL NAA+PROBE: NOT DETECTED
HCT VFR BLD AUTO: 30.7 % (ref 34–46.6)
HGB BLD-MCNC: 10.4 G/DL (ref 12–15.9)
HMPV RNA NPH QL NAA+NON-PROBE: NOT DETECTED
HPIV1 RNA ISLT QL NAA+PROBE: NOT DETECTED
HPIV2 RNA SPEC QL NAA+PROBE: NOT DETECTED
HPIV3 RNA NPH QL NAA+PROBE: NOT DETECTED
HPIV4 P GENE NPH QL NAA+PROBE: NOT DETECTED
M PNEUMO IGG SER IA-ACNC: NOT DETECTED
MCH RBC QN AUTO: 29.6 PG (ref 26.6–33)
MCHC RBC AUTO-ENTMCNC: 33.9 G/DL (ref 31.5–35.7)
MCV RBC AUTO: 87.5 FL (ref 79–97)
METHADONE UR QL SCN: NEGATIVE
OPIATES UR QL: NEGATIVE
OXYCODONE UR QL SCN: NEGATIVE
PCP UR QL SCN: NEGATIVE
PLATELET # BLD AUTO: 217 10*3/MM3 (ref 140–450)
PMV BLD AUTO: 10.4 FL (ref 6–12)
PROPOXYPH UR QL: NEGATIVE
RBC # BLD AUTO: 3.51 10*6/MM3 (ref 3.77–5.28)
RH BLD: POSITIVE
RHINOVIRUS RNA SPEC NAA+PROBE: NOT DETECTED
RSV RNA NPH QL NAA+NON-PROBE: NOT DETECTED
SARS-COV-2 RNA NPH QL NAA+NON-PROBE: NOT DETECTED
T&S EXPIRATION DATE: NORMAL
TRICYCLICS UR QL SCN: NEGATIVE
WBC NRBC COR # BLD: 10.53 10*3/MM3 (ref 3.4–10.8)

## 2022-01-18 PROCEDURE — 85027 COMPLETE CBC AUTOMATED: CPT | Performed by: OBSTETRICS & GYNECOLOGY

## 2022-01-18 PROCEDURE — 99213 OFFICE O/P EST LOW 20 MIN: CPT | Performed by: OBSTETRICS & GYNECOLOGY

## 2022-01-18 PROCEDURE — 86901 BLOOD TYPING SEROLOGIC RH(D): CPT | Performed by: OBSTETRICS & GYNECOLOGY

## 2022-01-18 PROCEDURE — 80306 DRUG TEST PRSMV INSTRMNT: CPT | Performed by: OBSTETRICS & GYNECOLOGY

## 2022-01-18 PROCEDURE — 86900 BLOOD TYPING SEROLOGIC ABO: CPT | Performed by: OBSTETRICS & GYNECOLOGY

## 2022-01-18 PROCEDURE — 86850 RBC ANTIBODY SCREEN: CPT | Performed by: OBSTETRICS & GYNECOLOGY

## 2022-01-18 PROCEDURE — 0202U NFCT DS 22 TRGT SARS-COV-2: CPT | Performed by: OBSTETRICS & GYNECOLOGY

## 2022-01-18 RX ORDER — SODIUM CHLORIDE, SODIUM LACTATE, POTASSIUM CHLORIDE, CALCIUM CHLORIDE 600; 310; 30; 20 MG/100ML; MG/100ML; MG/100ML; MG/100ML
125 INJECTION, SOLUTION INTRAVENOUS CONTINUOUS
Status: CANCELLED | OUTPATIENT
Start: 2022-01-18

## 2022-01-18 RX ORDER — SODIUM CHLORIDE, SODIUM LACTATE, POTASSIUM CHLORIDE, CALCIUM CHLORIDE 600; 310; 30; 20 MG/100ML; MG/100ML; MG/100ML; MG/100ML
125 INJECTION, SOLUTION INTRAVENOUS CONTINUOUS
Status: DISCONTINUED | OUTPATIENT
Start: 2022-01-18 | End: 2022-01-20 | Stop reason: HOSPADM

## 2022-01-18 RX ORDER — SODIUM CHLORIDE 0.9 % (FLUSH) 0.9 %
3 SYRINGE (ML) INJECTION EVERY 12 HOURS SCHEDULED
Status: CANCELLED | OUTPATIENT
Start: 2022-01-18

## 2022-01-18 RX ORDER — LIDOCAINE HYDROCHLORIDE 10 MG/ML
5 INJECTION, SOLUTION EPIDURAL; INFILTRATION; INTRACAUDAL; PERINEURAL AS NEEDED
Status: DISCONTINUED | OUTPATIENT
Start: 2022-01-18 | End: 2022-01-20

## 2022-01-18 RX ORDER — CARBOPROST TROMETHAMINE 250 UG/ML
250 INJECTION, SOLUTION INTRAMUSCULAR AS NEEDED
Status: CANCELLED | OUTPATIENT
Start: 2022-01-18

## 2022-01-18 RX ORDER — LIDOCAINE HYDROCHLORIDE 10 MG/ML
5 INJECTION, SOLUTION EPIDURAL; INFILTRATION; INTRACAUDAL; PERINEURAL AS NEEDED
Status: CANCELLED | OUTPATIENT
Start: 2022-01-18

## 2022-01-18 RX ORDER — PROMETHAZINE HYDROCHLORIDE 12.5 MG/1
12.5 TABLET ORAL EVERY 6 HOURS PRN
Status: DISCONTINUED | OUTPATIENT
Start: 2022-01-18 | End: 2022-01-20

## 2022-01-18 RX ORDER — BUTORPHANOL TARTRATE 1 MG/ML
1 INJECTION, SOLUTION INTRAMUSCULAR; INTRAVENOUS
Status: CANCELLED | OUTPATIENT
Start: 2022-01-18

## 2022-01-18 RX ORDER — MISOPROSTOL 100 UG/1
800 TABLET ORAL AS NEEDED
Status: CANCELLED | OUTPATIENT
Start: 2022-01-18

## 2022-01-18 RX ORDER — SODIUM CHLORIDE 0.9 % (FLUSH) 0.9 %
3 SYRINGE (ML) INJECTION EVERY 12 HOURS SCHEDULED
Status: DISCONTINUED | OUTPATIENT
Start: 2022-01-18 | End: 2022-01-20

## 2022-01-18 RX ORDER — BUTORPHANOL TARTRATE 1 MG/ML
1 INJECTION, SOLUTION INTRAMUSCULAR; INTRAVENOUS
Status: DISCONTINUED | OUTPATIENT
Start: 2022-01-18 | End: 2022-01-20

## 2022-01-18 RX ORDER — PROMETHAZINE HYDROCHLORIDE 25 MG/1
12.5 TABLET ORAL EVERY 6 HOURS PRN
Status: CANCELLED | OUTPATIENT
Start: 2022-01-18

## 2022-01-18 RX ORDER — METHYLERGONOVINE MALEATE 0.2 MG/ML
200 INJECTION INTRAVENOUS ONCE AS NEEDED
Status: CANCELLED | OUTPATIENT
Start: 2022-01-18

## 2022-01-18 RX ORDER — SODIUM CHLORIDE 0.9 % (FLUSH) 0.9 %
3-10 SYRINGE (ML) INJECTION AS NEEDED
Status: DISCONTINUED | OUTPATIENT
Start: 2022-01-18 | End: 2022-01-20

## 2022-01-18 RX ORDER — OXYTOCIN 10 [USP'U]/ML
650 INJECTION, SOLUTION INTRAMUSCULAR; INTRAVENOUS ONCE
Status: CANCELLED | OUTPATIENT
Start: 2022-01-18

## 2022-01-18 RX ORDER — BUTORPHANOL TARTRATE 1 MG/ML
2 INJECTION, SOLUTION INTRAMUSCULAR; INTRAVENOUS
Status: CANCELLED | OUTPATIENT
Start: 2022-01-18

## 2022-01-18 RX ORDER — PROMETHAZINE HYDROCHLORIDE 25 MG/1
12.5 SUPPOSITORY RECTAL EVERY 6 HOURS PRN
Status: CANCELLED | OUTPATIENT
Start: 2022-01-18

## 2022-01-18 RX ORDER — SODIUM CHLORIDE 0.9 % (FLUSH) 0.9 %
3-10 SYRINGE (ML) INJECTION AS NEEDED
Status: CANCELLED | OUTPATIENT
Start: 2022-01-18

## 2022-01-18 RX ORDER — PROMETHAZINE HYDROCHLORIDE 12.5 MG/1
12.5 SUPPOSITORY RECTAL EVERY 6 HOURS PRN
Status: DISCONTINUED | OUTPATIENT
Start: 2022-01-18 | End: 2022-01-20

## 2022-01-18 RX ORDER — OXYTOCIN 10 [USP'U]/ML
85 INJECTION, SOLUTION INTRAMUSCULAR; INTRAVENOUS ONCE
Status: CANCELLED | OUTPATIENT
Start: 2022-01-18

## 2022-01-18 NOTE — PROGRESS NOTES
CC: Prenatal visit    Denise Rod is a 26 y.o.  at 40w0d.  Doing well.  Denies contractions, LOF, or VB.  Reports good FM.  Elevated blood pressure today in clinic    /90   Wt (!) 146 kg (322 lb)   LMP 2021 (Exact Date)   BMI 57.04 kg/m²     Fundal Height (cm): 41 cm  Fetal Heart Rate: 140     Problems (from 21 to present)     Problem Noted Resolved    Maternal morbid obesity in second trimester, antepartum (HCC) 2021 by Violet Singleton, APRROSA ISELA No    Supervision of high risk pregnancy in second trimester 2021 by Violet Singleton, IRIS No          A/P: Denise Rod is a 26 y.o.  at 40w0d.  With elevated blood pressure today does not meet criteria for gestational hypertension, but given elevated blood pressure this late in pregnancy I did recommend induction of labor.  Fetal kick count and labor precautions given.  Patient to go to labor and delivery after going home.  -Sent to labor and delivery for induction of labor  - Reviewed COVID-19 visitation policy  - Reviewed COVID-19 precautions     Diagnosis Plan   1. Maternal morbid obesity in second trimester, antepartum (HCC)     2. Supervision of high risk pregnancy in second trimester     3. 40 weeks gestation of pregnancy     4. Elevated blood pressure affecting pregnancy, antepartum       Clayton Nicole DO  2022  15:35 CST

## 2022-01-18 NOTE — H&P (VIEW-ONLY)
Obstetric History and Physical    Chief Complaint   Patient presents with   • Routine Prenatal Visit           Patient is a 26 y.o. female  currently at 40w0d, who presents to the clinic for routine OB visit.  On evaluation patient was noted to be hypertensive with blood pressure at 142/90.  Given that the patient is 40 weeks, I recommended that she undergo induction of labor secondary to elevated blood pressure.  Patient felt comfortable with this plan.  Patient states positive fetal movement, denies loss of fluid or vaginal bleeding.  Patient denies any headache, vision changes or right upper quadrant pain at this time..    Her prenatal care is complicated by maternal obesity, and now hypertension in pregnancy although does not meet criteria for gestational hypertension yet     Obstetric History   # 1 - Date: None, Sex: None, Weight: None, GA: None, Delivery: None, Apgar1: None, Apgar5: None, Living: None, Birth Comments: None      The following portions of the patient's history were reviewed and updated as appropriate: current medications, allergies, past medical history, past surgical history, past family history, past social history and problem list .       Prenatal Information:  Prenatal Results     POC Urine Glucose/Protein     Test Value Reference Range Date Time    Urine Glucose        Urine Protein              Initial Prenatal Labs     Test Value Reference Range Date Time    Hemoglobin ^ 11.5 g/dL  21     Hematocrit ^ 35 %  21     Platelets ^ 302 K/µL  21     Rubella IgG        Hepatitis B SAg ^ Negative   21     Hepatitis C Ab  Non-Reactive  Non-Reactive 21 1005    RPR ^ Non-Reactive   21     ABO ^ O   21     Rh ^ Positive   21     Antibody Screen ^ Normal  Normal 21     HIV ^ non reactive   21     Urine Culture  50,000 CFU/mL Mixed Jessica Isolated   21 1005    Gonorrhea ^ negative   21     Chlamydia ^ negative   21      TSH  2.83 uIU/ml 0.46 - 4.68 12/22/14 0905    HgB A1c               2nd and 3rd Trimester     Test Value Reference Range Date Time    Hemoglobin (repeated)        Hematocrit (repeated)        Platelets  ^ 302 K/µL  06/17/21     GCT  148 mg/dL 65 - 139 09/21/21 1119    Antibody Screen (repeated)        GTT Fasting  90 mg/dL 65 - 94 09/27/21 0829    GTT 1 Hr  169 mg/dL 65 - 179 09/27/21 0829    GTT 2 Hr  137 mg/dL 65 - 154 09/27/21 0829    GTT 3 Hr  60 mg/dL 65 - 139 09/27/21 0829    Group B Strep  Positive  Negative 12/14/21 0916          Drug Screening     Test Value Reference Range Date Time    Amphetamine Screen  Negative  Negative 09/02/21 1005    Barbiturate Screen  Negative  Negative 09/02/21 1005    Benzodiazepine Screen  Negative  Negative 09/02/21 1005    Methadone Screen  Negative  Negative 09/02/21 1005    Phencyclidine Screen  Negative  Negative 09/02/21 1005    Opiates Screen  Negative  Negative 09/02/21 1005    THC Screen  Negative  Negative 09/02/21 1005    Cocaine Screen  Negative  Negative 09/02/21 1005    Propoxyphene Screen  Negative  Negative 09/02/21 1005    Buprenorphine Screen  Negative  Negative 09/02/21 1005    Methamphetamine Screen  Negative  Negative 09/02/21 1005    Oxycodone Screen  Negative  Negative 09/02/21 1005    Tricyclic Antidepressants Screen  Negative  Negative 09/02/21 1005          Other (Risk screening)     Test Value Reference Range Date Time    Varicella IgG        Parvovirus IgG        CMV IgG        Cystic Fibrosis        Hemoglobin electrophoresis        NIPT        MSAFP-4        AFP (for NTD only)              Legend    ^: Historical                      External Prenatal Results     Pregnancy Outside Results - Transcribed From Office Records - See Scanned Records For Details     Test Value Date Time    ABO ^ O  06/17/21     Rh ^ Positive  06/17/21     Antibody Screen ^ Normal  06/17/21     Varicella IgG       Rubella       Hgb ^ 11.5 g/dL 06/17/21     Hct ^ 35 %  21     Glucose Fasting GTT  90 mg/dL 21 0829    Glucose Tolerance Test 1 hour  169 mg/dL 21 0829    Glucose Tolerance Test 3 hour  60 mg/dL 21 0829    Gonorrhea (discrete) ^ negative  21     Chlamydia (discrete) ^ negative  21     RPR ^ Non-Reactive  21     VDRL       Syphilis Antibody       HBsAg ^ Negative  21     Herpes Simplex Virus PCR       Herpes Simplex VIrus Culture       HIV ^ non reactive  21     Hep C RNA Quant PCR       Hep C Antibody  Non-Reactive  21 1005    AFP       Group B Strep  Positive  21 0916    GBS Susceptibility to Clindamycin       GBS Susceptibility to Erythromycin       Fetal Fibronectin       Genetic Testing, Maternal Blood             Drug Screening     Test Value Date Time    Urine Drug Screen       Amphetamine Screen  Negative  21 1005    Barbiturate Screen  Negative  21 1005    Benzodiazepine Screen  Negative  21 1005    Methadone Screen  Negative  21 1005    Phencyclidine Screen  Negative  21 1005    Opiates Screen  Negative  21 1005    THC Screen  Negative  21 1005    Cocaine Screen       Propoxyphene Screen  Negative  21 1005    Buprenorphine Screen  Negative  21 1005    Methamphetamine Screen       Oxycodone Screen  Negative  21 1005    Tricyclic Antidepressants Screen  Negative  21 1005          Legend    ^: Historical                         Past OB History:     OB History    Para Term  AB Living   1 0 0 0 0 0   SAB IAB Ectopic Molar Multiple Live Births   0 0 0 0 0 0      # Outcome Date GA Lbr Sagar/2nd Weight Sex Delivery Anes PTL Lv   1 Current                 ALLERGIES:   No Known Allergies     Home Medications:     Prior to Admission medications    Medication Sig Start Date End Date Taking? Authorizing Provider   cetirizine (zyrTEC) 5 MG tablet Take 1 tablet by mouth Daily. 21 Yes Clayton Nicole, DO    docusate sodium (Colace) 100 MG capsule Take 1 capsule by mouth 2 (Two) Times a Day. 12/28/21  Yes Violet Singleton APRN   Prenatal Vit-Fe Fumarate-FA (prenatal vitamin 27-0.8) 27-0.8 MG tablet tablet Take 1 tablet by mouth Daily. 8/28/21  Yes Provider, MD Fan       Past Medical History: Past Medical History:   Diagnosis Date   • Anxiety       Past Surgical History Past Surgical History:   Procedure Laterality Date   • FOOT FRACTURE SURGERY  2021   • KNEE CARTILAGE SURGERY  2014      Family History: Family History   Problem Relation Age of Onset   • Hypertension Mother    • Hypertension Maternal Grandmother    • Diabetes Maternal Grandmother    • Depression Maternal Grandmother       Social History:  reports that she has never smoked. She has never used smokeless tobacco.   reports previous alcohol use.   reports no history of drug use.        Review of Systems   Constitutional: Negative for chills, fatigue and fever.   HENT: Negative for sore throat.    Eyes: Negative for visual disturbance.   Respiratory: Negative for cough, shortness of breath and wheezing.    Cardiovascular: Negative for chest pain, palpitations and leg swelling.   Gastrointestinal: Negative for abdominal pain, diarrhea, nausea and vomiting.   Endocrine: Negative for cold intolerance and heat intolerance.   Genitourinary: Negative for dysuria, flank pain, frequency, menstrual problem, pelvic pain, vaginal bleeding, vaginal discharge and vaginal pain.   Skin: Negative for color change and pallor.   Neurological: Negative for syncope, light-headedness and headaches.   Psychiatric/Behavioral: Negative for dysphoric mood and suicidal ideas. The patient is not nervous/anxious.      Objective     Documented Vitals    01/18/22 1458   BP: 142/90   Weight: (!) 146 kg (322 lb)       OBGyn Exam  Constitutional: Appears to be in no acute distress; Eyes: sclera normal; Endocrine system: thyroid palpate is normal; Pulmonary system: lungs  clear; Cardiovascular system: heart regular rate and rhythm; Gastrointestinal system: abdomen soft nontender, active bowel sounds; Urologic system: CVA negative; Psychiatric: appropriate insight; Neurologic: gait within normal limits, fetal heart rate 145 beats a minute.  Cervical exam deferred until admission      Last Labs  Lab Results   Component Value Date    WBC 10.04 (H) 2017    RBC 4.04 2017    HGB 11.5 2021    HCT 35 2021    MCV 87.6 2017    MCH 29.5 2017    MCHC 33.6 2017    RDW 12.1 2017    RDWSD 39.0 2017    MPV 10.4 2017     2021        Lab Results   Component Value Date    GLUCOSE 86 2014    BUN 16 2014    CREATININE 0.7 2014     2014    K 4.4 2014     2014    CO2 27 2014    CALCIUM 9.3 2014    PROTEINTOT 7.6 2014    ALBUMIN 4.1 2014    ALT 23 2014    AST 18 2014    ALKPHOS 93 2014    BILITOT 0.8 2014    ANIONGAP 13.0 2014       No results found for: HCGQUAL      Assessment/Plan:  1. 26 y.o. [id number].  Doing well with appropriately progressing pregnancy, now with elevated blood pressures in pregnancy at term, given this finding I recommended patient undergo induction of labor at this time.  Anticipate routine labor care, anticipate likely prolonged induction due to primipara with likely unfavorable cervix.  Recent growth scan showed reasonably sized fetus although patient is measuring larger than dates.  Plan to start induction of labor with Cervidil and then will transition to Pitocin and possibly Dawson bulb tomorrow depending on how patient is doing.      Denise Rod and I have discussed pain goals for this hospitalization after reviewing her current clinical condition, medical history and prior pain experiences.  The goal is to keep her pain level tolerable.  To help achieve this, I plan to offer IV pain medication  and epidural if patient desires..           This document has been electronically signed by Clayton Nicole DO on January 18, 2022 15:32 CST

## 2022-01-18 NOTE — H&P
Obstetric History and Physical    Chief Complaint   Patient presents with   • Routine Prenatal Visit           Patient is a 26 y.o. female  currently at 40w0d, who presents to the clinic for routine OB visit.  On evaluation patient was noted to be hypertensive with blood pressure at 142/90.  Given that the patient is 40 weeks, I recommended that she undergo induction of labor secondary to elevated blood pressure.  Patient felt comfortable with this plan.  Patient states positive fetal movement, denies loss of fluid or vaginal bleeding.  Patient denies any headache, vision changes or right upper quadrant pain at this time..    Her prenatal care is complicated by maternal obesity, and now hypertension in pregnancy although does not meet criteria for gestational hypertension yet     Obstetric History   # 1 - Date: None, Sex: None, Weight: None, GA: None, Delivery: None, Apgar1: None, Apgar5: None, Living: None, Birth Comments: None      The following portions of the patient's history were reviewed and updated as appropriate: current medications, allergies, past medical history, past surgical history, past family history, past social history and problem list .       Prenatal Information:  Prenatal Results     POC Urine Glucose/Protein     Test Value Reference Range Date Time    Urine Glucose        Urine Protein              Initial Prenatal Labs     Test Value Reference Range Date Time    Hemoglobin ^ 11.5 g/dL  21     Hematocrit ^ 35 %  21     Platelets ^ 302 K/µL  21     Rubella IgG        Hepatitis B SAg ^ Negative   21     Hepatitis C Ab  Non-Reactive  Non-Reactive 21 1005    RPR ^ Non-Reactive   21     ABO ^ O   21     Rh ^ Positive   21     Antibody Screen ^ Normal  Normal 21     HIV ^ non reactive   21     Urine Culture  50,000 CFU/mL Mixed Jessica Isolated   21 1005    Gonorrhea ^ negative   21     Chlamydia ^ negative   21      TSH  2.83 uIU/ml 0.46 - 4.68 12/22/14 0905    HgB A1c               2nd and 3rd Trimester     Test Value Reference Range Date Time    Hemoglobin (repeated)        Hematocrit (repeated)        Platelets  ^ 302 K/µL  06/17/21     GCT  148 mg/dL 65 - 139 09/21/21 1119    Antibody Screen (repeated)        GTT Fasting  90 mg/dL 65 - 94 09/27/21 0829    GTT 1 Hr  169 mg/dL 65 - 179 09/27/21 0829    GTT 2 Hr  137 mg/dL 65 - 154 09/27/21 0829    GTT 3 Hr  60 mg/dL 65 - 139 09/27/21 0829    Group B Strep  Positive  Negative 12/14/21 0916          Drug Screening     Test Value Reference Range Date Time    Amphetamine Screen  Negative  Negative 09/02/21 1005    Barbiturate Screen  Negative  Negative 09/02/21 1005    Benzodiazepine Screen  Negative  Negative 09/02/21 1005    Methadone Screen  Negative  Negative 09/02/21 1005    Phencyclidine Screen  Negative  Negative 09/02/21 1005    Opiates Screen  Negative  Negative 09/02/21 1005    THC Screen  Negative  Negative 09/02/21 1005    Cocaine Screen  Negative  Negative 09/02/21 1005    Propoxyphene Screen  Negative  Negative 09/02/21 1005    Buprenorphine Screen  Negative  Negative 09/02/21 1005    Methamphetamine Screen  Negative  Negative 09/02/21 1005    Oxycodone Screen  Negative  Negative 09/02/21 1005    Tricyclic Antidepressants Screen  Negative  Negative 09/02/21 1005          Other (Risk screening)     Test Value Reference Range Date Time    Varicella IgG        Parvovirus IgG        CMV IgG        Cystic Fibrosis        Hemoglobin electrophoresis        NIPT        MSAFP-4        AFP (for NTD only)              Legend    ^: Historical                      External Prenatal Results     Pregnancy Outside Results - Transcribed From Office Records - See Scanned Records For Details     Test Value Date Time    ABO ^ O  06/17/21     Rh ^ Positive  06/17/21     Antibody Screen ^ Normal  06/17/21     Varicella IgG       Rubella       Hgb ^ 11.5 g/dL 06/17/21     Hct ^ 35 %  21     Glucose Fasting GTT  90 mg/dL 21 0829    Glucose Tolerance Test 1 hour  169 mg/dL 21 0829    Glucose Tolerance Test 3 hour  60 mg/dL 21 0829    Gonorrhea (discrete) ^ negative  21     Chlamydia (discrete) ^ negative  21     RPR ^ Non-Reactive  21     VDRL       Syphilis Antibody       HBsAg ^ Negative  21     Herpes Simplex Virus PCR       Herpes Simplex VIrus Culture       HIV ^ non reactive  21     Hep C RNA Quant PCR       Hep C Antibody  Non-Reactive  21 1005    AFP       Group B Strep  Positive  21 0916    GBS Susceptibility to Clindamycin       GBS Susceptibility to Erythromycin       Fetal Fibronectin       Genetic Testing, Maternal Blood             Drug Screening     Test Value Date Time    Urine Drug Screen       Amphetamine Screen  Negative  21 1005    Barbiturate Screen  Negative  21 1005    Benzodiazepine Screen  Negative  21 1005    Methadone Screen  Negative  21 1005    Phencyclidine Screen  Negative  21 1005    Opiates Screen  Negative  21 1005    THC Screen  Negative  21 1005    Cocaine Screen       Propoxyphene Screen  Negative  21 1005    Buprenorphine Screen  Negative  21 1005    Methamphetamine Screen       Oxycodone Screen  Negative  21 1005    Tricyclic Antidepressants Screen  Negative  21 1005          Legend    ^: Historical                         Past OB History:     OB History    Para Term  AB Living   1 0 0 0 0 0   SAB IAB Ectopic Molar Multiple Live Births   0 0 0 0 0 0      # Outcome Date GA Lbr Sagar/2nd Weight Sex Delivery Anes PTL Lv   1 Current                 ALLERGIES:   No Known Allergies     Home Medications:     Prior to Admission medications    Medication Sig Start Date End Date Taking? Authorizing Provider   cetirizine (zyrTEC) 5 MG tablet Take 1 tablet by mouth Daily. 21 Yes Clayton Nicole, DO    docusate sodium (Colace) 100 MG capsule Take 1 capsule by mouth 2 (Two) Times a Day. 12/28/21  Yes Violet Singleton APRN   Prenatal Vit-Fe Fumarate-FA (prenatal vitamin 27-0.8) 27-0.8 MG tablet tablet Take 1 tablet by mouth Daily. 8/28/21  Yes Provider, MD Fan       Past Medical History: Past Medical History:   Diagnosis Date   • Anxiety       Past Surgical History Past Surgical History:   Procedure Laterality Date   • FOOT FRACTURE SURGERY  2021   • KNEE CARTILAGE SURGERY  2014      Family History: Family History   Problem Relation Age of Onset   • Hypertension Mother    • Hypertension Maternal Grandmother    • Diabetes Maternal Grandmother    • Depression Maternal Grandmother       Social History:  reports that she has never smoked. She has never used smokeless tobacco.   reports previous alcohol use.   reports no history of drug use.        Review of Systems   Constitutional: Negative for chills, fatigue and fever.   HENT: Negative for sore throat.    Eyes: Negative for visual disturbance.   Respiratory: Negative for cough, shortness of breath and wheezing.    Cardiovascular: Negative for chest pain, palpitations and leg swelling.   Gastrointestinal: Negative for abdominal pain, diarrhea, nausea and vomiting.   Endocrine: Negative for cold intolerance and heat intolerance.   Genitourinary: Negative for dysuria, flank pain, frequency, menstrual problem, pelvic pain, vaginal bleeding, vaginal discharge and vaginal pain.   Skin: Negative for color change and pallor.   Neurological: Negative for syncope, light-headedness and headaches.   Psychiatric/Behavioral: Negative for dysphoric mood and suicidal ideas. The patient is not nervous/anxious.      Objective     Documented Vitals    01/18/22 1458   BP: 142/90   Weight: (!) 146 kg (322 lb)       OBGyn Exam  Constitutional: Appears to be in no acute distress; Eyes: sclera normal; Endocrine system: thyroid palpate is normal; Pulmonary system: lungs  clear; Cardiovascular system: heart regular rate and rhythm; Gastrointestinal system: abdomen soft nontender, active bowel sounds; Urologic system: CVA negative; Psychiatric: appropriate insight; Neurologic: gait within normal limits, fetal heart rate 145 beats a minute.  Cervical exam deferred until admission      Last Labs  Lab Results   Component Value Date    WBC 10.04 (H) 2017    RBC 4.04 2017    HGB 11.5 2021    HCT 35 2021    MCV 87.6 2017    MCH 29.5 2017    MCHC 33.6 2017    RDW 12.1 2017    RDWSD 39.0 2017    MPV 10.4 2017     2021        Lab Results   Component Value Date    GLUCOSE 86 2014    BUN 16 2014    CREATININE 0.7 2014     2014    K 4.4 2014     2014    CO2 27 2014    CALCIUM 9.3 2014    PROTEINTOT 7.6 2014    ALBUMIN 4.1 2014    ALT 23 2014    AST 18 2014    ALKPHOS 93 2014    BILITOT 0.8 2014    ANIONGAP 13.0 2014       No results found for: HCGQUAL      Assessment/Plan:  1. 26 y.o. [id number].  Doing well with appropriately progressing pregnancy, now with elevated blood pressures in pregnancy at term, given this finding I recommended patient undergo induction of labor at this time.  Anticipate routine labor care, anticipate likely prolonged induction due to primipara with likely unfavorable cervix.  Recent growth scan showed reasonably sized fetus although patient is measuring larger than dates.  Plan to start induction of labor with Cervidil and then will transition to Pitocin and possibly Dawson bulb tomorrow depending on how patient is doing.      Denise Rod and I have discussed pain goals for this hospitalization after reviewing her current clinical condition, medical history and prior pain experiences.  The goal is to keep her pain level tolerable.  To help achieve this, I plan to offer IV pain medication  and epidural if patient desires..           This document has been electronically signed by Clayton Nicole DO on January 18, 2022 15:32 CST

## 2022-01-19 PROBLEM — O09.93 SUPERVISION OF HIGH RISK PREGNANCY IN THIRD TRIMESTER: Status: ACTIVE | Noted: 2021-09-21

## 2022-01-19 PROBLEM — O13.3 GESTATIONAL HYPERTENSION, THIRD TRIMESTER: Status: ACTIVE | Noted: 2022-01-18

## 2022-01-19 PROBLEM — O99.213 OBESITY AFFECTING PREGNANCY IN THIRD TRIMESTER: Status: ACTIVE | Noted: 2021-09-21

## 2022-01-19 LAB
ABO GROUP BLD: NORMAL
HOLD SPECIMEN: NORMAL
Lab: NORMAL
RH BLD: POSITIVE
WHOLE BLOOD HOLD SPECIMEN: NORMAL

## 2022-01-19 PROCEDURE — 0 PENICILLIN G POTASSIUM PER 600000 UNITS: Performed by: OBSTETRICS & GYNECOLOGY

## 2022-01-19 PROCEDURE — 86901 BLOOD TYPING SEROLOGIC RH(D): CPT

## 2022-01-19 PROCEDURE — 25010000002 BUTORPHANOL PER 1 MG: Performed by: OBSTETRICS & GYNECOLOGY

## 2022-01-19 PROCEDURE — 86900 BLOOD TYPING SEROLOGIC ABO: CPT

## 2022-01-19 RX ORDER — OXYTOCIN/0.9 % SODIUM CHLORIDE 30/500 ML
2 PLASTIC BAG, INJECTION (ML) INTRAVENOUS
Status: DISCONTINUED | OUTPATIENT
Start: 2022-01-19 | End: 2022-01-20

## 2022-01-19 RX ORDER — ZOLPIDEM TARTRATE 5 MG/1
5 TABLET ORAL ONCE
Status: COMPLETED | OUTPATIENT
Start: 2022-01-19 | End: 2022-01-19

## 2022-01-19 RX ADMIN — SODIUM CHLORIDE 3 MILLION UNITS: 900 INJECTION INTRAVENOUS at 12:42

## 2022-01-19 RX ADMIN — OXYTOCIN-SODIUM CHLORIDE 0.9% IV SOLN 30 UNIT/500ML 2 MILLI-UNITS/MIN: 30-0.9/5 SOLUTION at 07:53

## 2022-01-19 RX ADMIN — SODIUM CHLORIDE 3 MILLION UNITS: 900 INJECTION INTRAVENOUS at 21:00

## 2022-01-19 RX ADMIN — SODIUM CHLORIDE, POTASSIUM CHLORIDE, SODIUM LACTATE AND CALCIUM CHLORIDE 125 ML/HR: 600; 310; 30; 20 INJECTION, SOLUTION INTRAVENOUS at 23:56

## 2022-01-19 RX ADMIN — SODIUM CHLORIDE 3 MILLION UNITS: 900 INJECTION INTRAVENOUS at 08:49

## 2022-01-19 RX ADMIN — SODIUM CHLORIDE, POTASSIUM CHLORIDE, SODIUM LACTATE AND CALCIUM CHLORIDE 125 ML/HR: 600; 310; 30; 20 INJECTION, SOLUTION INTRAVENOUS at 12:42

## 2022-01-19 RX ADMIN — BUTORPHANOL TARTRATE 2 MG: 2 INJECTION, SOLUTION INTRAMUSCULAR; INTRAVENOUS at 18:10

## 2022-01-19 RX ADMIN — ZOLPIDEM TARTRATE 5 MG: 5 TABLET ORAL at 23:56

## 2022-01-19 RX ADMIN — SODIUM CHLORIDE 3 MILLION UNITS: 900 INJECTION INTRAVENOUS at 17:29

## 2022-01-19 NOTE — INTERVAL H&P NOTE
27 yo  at 40+0 by L=9 presenting for IOL for elevated BP without the diagnosis of HTN with concern for developing GHTN at term.   Denies HA, CP, SOA, RUQ pain. Good FM. Denies ctx, LOF, or VB. Had elevated BP for first time today 142/90 in clinic. No other mild range BPs yet.    Vitals:    22 1726   BP: 137/74   Pulse: 108     Gen: well appearing, NAD  CV: RRR  Chest: no increased work of breathing  Abd: nontender, gravid  Ext: nontender, trace edema    US Ob Follow Up Transabdominal Approach    Result Date: 2022  INDICATION: Growth, O36.63X0 Maternal care for excessive fetal growth, third trimester, not applicable or unspecified EXAMINATION: Ultrasound US PREGNANCY FOLLOW UP TECHNIQUE: Transabdominal pelvic ultrasound was performed. COMPARISON: Ultrasound examination 2021 ____________________________________________ LMP: Unknown. Provided EGA: 39 weeks 1 day FINDINGS: Study very limited by maternal body habitus. Measurements were difficult to obtain. INTRAUTERINE GESTATION(s): Single. FETAL HEART MOTION is 137 bpm. BIOMETRIC MEASUREMENTS: HEAD CIRCUMFERENCE: 36.21 cm which corresponds to greater than 40 weeks. BIPARIETAL DIAMETER: 9.16 cm which corresponds to greater than 97th percentile. ABDOMINAL CIRCUMFERENCE: 32.9 cm which corresponds to 36 weeks 6 days. FEMORAL LENGTH: 7.0 cm which corresponds to 35 weeks 6 days. Less than 3rd percentile ESTIMATED DUE DATE (ARMINDA): 2022 ESTIMATED FETAL AGE AND WEIGHT: 36 weeks 4 days. 3156 g. 24th percentile FETAL PRESENTATION:  Cephalic AMNIOTIC FLUID INDEX (CYNDI): 15.0 BIOPHYSICAL PROFILE (BPP): Not assessed. PLACENTA:  Posterior. There is no placenta previa or abruption. CERVIX:  Obscured by fetal head MATERNAL OVARIES: Not seen. FREE FLUID: None. Please note that a full anatomic survey was not performed.     Single live intrauterine pregnancy with an estimated gestational age of 36 weeks 4 days. There is significant discrepancy with prior  measurements. Current measurements were extremely difficult to obtain. Accuracy of these measurements is uncertain.  Electronically signed by:  Josemanuel Steinberg MD  2022 3:55 PM CST Workstation: [id number]       27 yo  at 40+0 by L=9 presenting for IOL for elevated BP without the diagnosis of HTN with concern for developing GHTN at term.   Pregnancy c/b obesity, abnormal 1 hr/normal 3 hr GTT, GBS+, S>D, elevated BP without dx of HTN  To start PCN for GBS ppx  Cervidil for induction: cervix /-3, anterior, medium consistency, confirmed Cephalic on US  Considering epidural when in pain

## 2022-01-19 NOTE — SIGNIFICANT NOTE
Pt on birthing ball  Unable to trace FHT with mom in this position.  I have been at bedside trying nto trace baby.

## 2022-01-20 ENCOUNTER — ANESTHESIA EVENT (OUTPATIENT)
Dept: LABOR AND DELIVERY | Facility: HOSPITAL | Age: 27
End: 2022-01-20

## 2022-01-20 ENCOUNTER — ANESTHESIA (OUTPATIENT)
Dept: LABOR AND DELIVERY | Facility: HOSPITAL | Age: 27
End: 2022-01-20

## 2022-01-20 PROCEDURE — 25010000002 BUTORPHANOL PER 1 MG: Performed by: OBSTETRICS & GYNECOLOGY

## 2022-01-20 PROCEDURE — 25010000002 CEFAZOLIN PER 500 MG

## 2022-01-20 PROCEDURE — 25010000002 KETOROLAC TROMETHAMINE PER 15 MG: Performed by: NURSE ANESTHETIST, CERTIFIED REGISTERED

## 2022-01-20 PROCEDURE — 0 PENICILLIN G POTASSIUM PER 600000 UNITS: Performed by: OBSTETRICS & GYNECOLOGY

## 2022-01-20 PROCEDURE — 25010000002 AZITHROMYCIN PER 500 MG: Performed by: OBSTETRICS & GYNECOLOGY

## 2022-01-20 PROCEDURE — 59514 CESAREAN DELIVERY ONLY: CPT | Performed by: OBSTETRICS & GYNECOLOGY

## 2022-01-20 PROCEDURE — 25010000002 MORPHINE PER 10 MG: Performed by: NURSE ANESTHETIST, CERTIFIED REGISTERED

## 2022-01-20 PROCEDURE — 25010000002 DIPHENHYDRAMINE PER 50 MG: Performed by: NURSE ANESTHETIST, CERTIFIED REGISTERED

## 2022-01-20 PROCEDURE — 51703 INSERT BLADDER CATH COMPLEX: CPT

## 2022-01-20 PROCEDURE — 25010000002 ONDANSETRON PER 1 MG: Performed by: NURSE ANESTHETIST, CERTIFIED REGISTERED

## 2022-01-20 PROCEDURE — 59514 CESAREAN DELIVERY ONLY: CPT | Performed by: SPECIALIST/TECHNOLOGIST, OTHER

## 2022-01-20 RX ORDER — OXYTOCIN/0.9 % SODIUM CHLORIDE 30/500 ML
85 PLASTIC BAG, INJECTION (ML) INTRAVENOUS ONCE
Status: COMPLETED | OUTPATIENT
Start: 2022-01-20 | End: 2022-01-20

## 2022-01-20 RX ORDER — KETOROLAC TROMETHAMINE 30 MG/ML
30 INJECTION, SOLUTION INTRAMUSCULAR; INTRAVENOUS EVERY 6 HOURS
Status: DISPENSED | OUTPATIENT
Start: 2022-01-21 | End: 2022-01-21

## 2022-01-20 RX ORDER — OXYCODONE HYDROCHLORIDE 5 MG/1
5 TABLET ORAL EVERY 4 HOURS PRN
Status: DISCONTINUED | OUTPATIENT
Start: 2022-01-21 | End: 2022-01-23 | Stop reason: HOSPADM

## 2022-01-20 RX ORDER — CETIRIZINE HYDROCHLORIDE 5 MG/1
5 TABLET ORAL DAILY
Status: DISCONTINUED | OUTPATIENT
Start: 2022-01-21 | End: 2022-01-23 | Stop reason: HOSPADM

## 2022-01-20 RX ORDER — DIPHENHYDRAMINE HYDROCHLORIDE 50 MG/ML
25 INJECTION INTRAMUSCULAR; INTRAVENOUS EVERY 4 HOURS PRN
Status: DISCONTINUED | OUTPATIENT
Start: 2022-01-20 | End: 2022-01-23 | Stop reason: HOSPADM

## 2022-01-20 RX ORDER — CARBOPROST TROMETHAMINE 250 UG/ML
INJECTION, SOLUTION INTRAMUSCULAR
Status: DISPENSED
Start: 2022-01-20 | End: 2022-01-21

## 2022-01-20 RX ORDER — POLYETHYLENE GLYCOL 3350 17 G/17G
17 POWDER, FOR SOLUTION ORAL DAILY
Status: DISCONTINUED | OUTPATIENT
Start: 2022-01-20 | End: 2022-01-23 | Stop reason: HOSPADM

## 2022-01-20 RX ORDER — TRISODIUM CITRATE DIHYDRATE AND CITRIC ACID MONOHYDRATE 500; 334 MG/5ML; MG/5ML
30 SOLUTION ORAL ONCE
Status: COMPLETED | OUTPATIENT
Start: 2022-01-20 | End: 2022-01-20

## 2022-01-20 RX ORDER — HYDROCORTISONE 25 MG/G
CREAM TOPICAL 3 TIMES DAILY PRN
Status: DISCONTINUED | OUTPATIENT
Start: 2022-01-20 | End: 2022-01-23 | Stop reason: HOSPADM

## 2022-01-20 RX ORDER — METHYLERGONOVINE MALEATE 0.2 MG/ML
200 INJECTION INTRAVENOUS ONCE AS NEEDED
Status: DISCONTINUED | OUTPATIENT
Start: 2022-01-20 | End: 2022-01-20 | Stop reason: HOSPADM

## 2022-01-20 RX ORDER — MISOPROSTOL 200 UG/1
800 TABLET ORAL AS NEEDED
Status: DISCONTINUED | OUTPATIENT
Start: 2022-01-20 | End: 2022-01-20 | Stop reason: HOSPADM

## 2022-01-20 RX ORDER — MORPHINE SULFATE 1 MG/ML
INJECTION, SOLUTION EPIDURAL; INTRATHECAL; INTRAVENOUS AS NEEDED
Status: DISCONTINUED | OUTPATIENT
Start: 2022-01-20 | End: 2022-01-20 | Stop reason: SURG

## 2022-01-20 RX ORDER — PRENATAL VIT/IRON FUM/FOLIC AC 27MG-0.8MG
1 TABLET ORAL DAILY
Status: DISCONTINUED | OUTPATIENT
Start: 2022-01-21 | End: 2022-01-23 | Stop reason: HOSPADM

## 2022-01-20 RX ORDER — DIPHENHYDRAMINE HCL 25 MG
25 CAPSULE ORAL EVERY 4 HOURS PRN
Status: DISCONTINUED | OUTPATIENT
Start: 2022-01-20 | End: 2022-01-23 | Stop reason: HOSPADM

## 2022-01-20 RX ORDER — NALOXONE HCL 0.4 MG/ML
0.2 VIAL (ML) INJECTION
Status: DISCONTINUED | OUTPATIENT
Start: 2022-01-20 | End: 2022-01-23 | Stop reason: HOSPADM

## 2022-01-20 RX ORDER — ONDANSETRON 2 MG/ML
4 INJECTION INTRAMUSCULAR; INTRAVENOUS ONCE AS NEEDED
Status: DISCONTINUED | OUTPATIENT
Start: 2022-01-20 | End: 2022-01-20 | Stop reason: SDUPTHER

## 2022-01-20 RX ORDER — ALUMINA, MAGNESIA, AND SIMETHICONE 2400; 2400; 240 MG/30ML; MG/30ML; MG/30ML
15 SUSPENSION ORAL EVERY 4 HOURS PRN
Status: DISCONTINUED | OUTPATIENT
Start: 2022-01-20 | End: 2022-01-23 | Stop reason: HOSPADM

## 2022-01-20 RX ORDER — ONDANSETRON 2 MG/ML
INJECTION INTRAMUSCULAR; INTRAVENOUS AS NEEDED
Status: DISCONTINUED | OUTPATIENT
Start: 2022-01-20 | End: 2022-01-20 | Stop reason: SURG

## 2022-01-20 RX ORDER — CEFAZOLIN SODIUM IN 0.9 % NACL 3 G/100 ML
3 INTRAVENOUS SOLUTION, PIGGYBACK (ML) INTRAVENOUS ONCE
Status: COMPLETED | OUTPATIENT
Start: 2022-01-20 | End: 2022-01-20

## 2022-01-20 RX ORDER — OXYTOCIN 10 [USP'U]/ML
INJECTION, SOLUTION INTRAMUSCULAR; INTRAVENOUS
Status: DISCONTINUED
Start: 2022-01-20 | End: 2022-01-20 | Stop reason: WASHOUT

## 2022-01-20 RX ORDER — BUPIVACAINE HYDROCHLORIDE 7.5 MG/ML
INJECTION, SOLUTION EPIDURAL; RETROBULBAR
Status: COMPLETED | OUTPATIENT
Start: 2022-01-20 | End: 2022-01-20

## 2022-01-20 RX ORDER — CARBOPROST TROMETHAMINE 250 UG/ML
250 INJECTION, SOLUTION INTRAMUSCULAR AS NEEDED
Status: DISCONTINUED | OUTPATIENT
Start: 2022-01-20 | End: 2022-01-20 | Stop reason: HOSPADM

## 2022-01-20 RX ORDER — OXYCODONE HYDROCHLORIDE 10 MG/1
10 TABLET ORAL EVERY 4 HOURS PRN
Status: DISCONTINUED | OUTPATIENT
Start: 2022-01-21 | End: 2022-01-23 | Stop reason: HOSPADM

## 2022-01-20 RX ORDER — METHYLERGONOVINE MALEATE 0.2 MG/ML
INJECTION INTRAVENOUS
Status: DISPENSED
Start: 2022-01-20 | End: 2022-01-21

## 2022-01-20 RX ORDER — OXYTOCIN/0.9 % SODIUM CHLORIDE 30/500 ML
PLASTIC BAG, INJECTION (ML) INTRAVENOUS CONTINUOUS PRN
Status: DISCONTINUED | OUTPATIENT
Start: 2022-01-20 | End: 2022-01-20 | Stop reason: SURG

## 2022-01-20 RX ORDER — OXYTOCIN/0.9 % SODIUM CHLORIDE 30/500 ML
PLASTIC BAG, INJECTION (ML) INTRAVENOUS
Status: COMPLETED
Start: 2022-01-20 | End: 2022-01-20

## 2022-01-20 RX ORDER — ONDANSETRON 2 MG/ML
4 INJECTION INTRAMUSCULAR; INTRAVENOUS EVERY 6 HOURS PRN
Status: DISCONTINUED | OUTPATIENT
Start: 2022-01-20 | End: 2022-01-23 | Stop reason: HOSPADM

## 2022-01-20 RX ORDER — KETOROLAC TROMETHAMINE 30 MG/ML
INJECTION, SOLUTION INTRAMUSCULAR; INTRAVENOUS AS NEEDED
Status: DISCONTINUED | OUTPATIENT
Start: 2022-01-20 | End: 2022-01-20 | Stop reason: SURG

## 2022-01-20 RX ORDER — DOCUSATE SODIUM 100 MG/1
100 CAPSULE, LIQUID FILLED ORAL 2 TIMES DAILY
Status: DISCONTINUED | OUTPATIENT
Start: 2022-01-20 | End: 2022-01-23 | Stop reason: HOSPADM

## 2022-01-20 RX ORDER — EPHEDRINE SULFATE 50 MG/ML
INJECTION, SOLUTION INTRAVENOUS AS NEEDED
Status: DISCONTINUED | OUTPATIENT
Start: 2022-01-20 | End: 2022-01-20 | Stop reason: SURG

## 2022-01-20 RX ORDER — OXYTOCIN/0.9 % SODIUM CHLORIDE 30/500 ML
650 PLASTIC BAG, INJECTION (ML) INTRAVENOUS ONCE
Status: COMPLETED | OUTPATIENT
Start: 2022-01-20 | End: 2022-01-20

## 2022-01-20 RX ORDER — ONDANSETRON 4 MG/1
4 TABLET, FILM COATED ORAL EVERY 8 HOURS PRN
Status: DISCONTINUED | OUTPATIENT
Start: 2022-01-20 | End: 2022-01-23 | Stop reason: HOSPADM

## 2022-01-20 RX ORDER — IBUPROFEN 800 MG/1
800 TABLET ORAL EVERY 8 HOURS SCHEDULED
Status: DISCONTINUED | OUTPATIENT
Start: 2022-01-21 | End: 2022-01-23 | Stop reason: HOSPADM

## 2022-01-20 RX ORDER — CALCIUM CARBONATE 200(500)MG
1 TABLET,CHEWABLE ORAL EVERY 4 HOURS PRN
Status: DISCONTINUED | OUTPATIENT
Start: 2022-01-20 | End: 2022-01-23 | Stop reason: HOSPADM

## 2022-01-20 RX ORDER — SIMETHICONE 80 MG
80 TABLET,CHEWABLE ORAL 4 TIMES DAILY
Status: DISCONTINUED | OUTPATIENT
Start: 2022-01-20 | End: 2022-01-23 | Stop reason: HOSPADM

## 2022-01-20 RX ADMIN — ONDANSETRON 4 MG: 2 INJECTION INTRAMUSCULAR; INTRAVENOUS at 17:19

## 2022-01-20 RX ADMIN — OXYTOCIN-SODIUM CHLORIDE 0.9% IV SOLN 30 UNIT/500ML 650 ML/HR: 30-0.9/5 SOLUTION at 20:10

## 2022-01-20 RX ADMIN — OXYTOCIN-SODIUM CHLORIDE 0.9% IV SOLN 30 UNIT/500ML 650 ML/HR: 30-0.9/5 SOLUTION at 17:52

## 2022-01-20 RX ADMIN — SODIUM CHLORIDE 3 MILLION UNITS: 900 INJECTION INTRAVENOUS at 09:31

## 2022-01-20 RX ADMIN — Medication 3 G: at 17:28

## 2022-01-20 RX ADMIN — SODIUM CHLORIDE 3 MILLION UNITS: 900 INJECTION INTRAVENOUS at 13:31

## 2022-01-20 RX ADMIN — DOCUSATE SODIUM 100 MG: 100 CAPSULE, LIQUID FILLED ORAL at 23:11

## 2022-01-20 RX ADMIN — BUTORPHANOL TARTRATE 2 MG: 2 INJECTION, SOLUTION INTRAMUSCULAR; INTRAVENOUS at 07:14

## 2022-01-20 RX ADMIN — EPHEDRINE SULFATE 15 MG: 50 INJECTION INTRAVENOUS at 17:29

## 2022-01-20 RX ADMIN — BUTORPHANOL TARTRATE 2 MG: 2 INJECTION, SOLUTION INTRAMUSCULAR; INTRAVENOUS at 03:36

## 2022-01-20 RX ADMIN — KETOROLAC TROMETHAMINE 30 MG: 30 INJECTION, SOLUTION INTRAMUSCULAR; INTRAVENOUS at 18:22

## 2022-01-20 RX ADMIN — OXYTOCIN-SODIUM CHLORIDE 0.9% IV SOLN 30 UNIT/500ML 85 ML/HR: 30-0.9/5 SOLUTION at 18:45

## 2022-01-20 RX ADMIN — Medication 0.2 MG: at 17:26

## 2022-01-20 RX ADMIN — SIMETHICONE 80 MG: 80 TABLET, CHEWABLE ORAL at 23:11

## 2022-01-20 RX ADMIN — SODIUM CHLORIDE, POTASSIUM CHLORIDE, SODIUM LACTATE AND CALCIUM CHLORIDE 1000 ML: 600; 310; 30; 20 INJECTION, SOLUTION INTRAVENOUS at 17:05

## 2022-01-20 RX ADMIN — BUPIVACAINE HYDROCHLORIDE 1.8 ML: 7.5 INJECTION, SOLUTION EPIDURAL; RETROBULBAR at 17:26

## 2022-01-20 RX ADMIN — SODIUM CHLORIDE 3 MILLION UNITS: 900 INJECTION INTRAVENOUS at 05:12

## 2022-01-20 RX ADMIN — DIPHENHYDRAMINE HYDROCHLORIDE 25 MG: 50 INJECTION INTRAMUSCULAR; INTRAVENOUS at 23:11

## 2022-01-20 RX ADMIN — SODIUM CHLORIDE, POTASSIUM CHLORIDE, SODIUM LACTATE AND CALCIUM CHLORIDE 125 ML/HR: 600; 310; 30; 20 INJECTION, SOLUTION INTRAVENOUS at 08:47

## 2022-01-20 RX ADMIN — SODIUM CHLORIDE 3 MILLION UNITS: 900 INJECTION INTRAVENOUS at 00:51

## 2022-01-20 RX ADMIN — SODIUM CITRATE AND CITRIC ACID MONOHYDRATE 30 ML: 500; 334 SOLUTION ORAL at 17:03

## 2022-01-20 RX ADMIN — BUTORPHANOL TARTRATE 2 MG: 2 INJECTION, SOLUTION INTRAMUSCULAR; INTRAVENOUS at 00:33

## 2022-01-20 RX ADMIN — BUTORPHANOL TARTRATE 2 MG: 2 INJECTION, SOLUTION INTRAMUSCULAR; INTRAVENOUS at 12:00

## 2022-01-20 NOTE — ANESTHESIA PROCEDURE NOTES
Spinal Block      Patient reassessed immediately prior to procedure    Patient location during procedure: OB  Indication:at surgeon's request  Performed By  CRNA: Patrick Baer CRNA  Preanesthetic Checklist  Completed: patient identified, IV checked, site marked, risks and benefits discussed, surgical consent, monitors and equipment checked, pre-op evaluation and timeout performed  Spinal Block Prep:  Patient Position:sitting  Sterile Tech:cap, gloves, mask and sterile barriers  Prep:Chloraprep  Patient Monitoring:continuous pulse oximetry, blood pressure monitoring and EKG  Spinal Block Procedure  Approach:midline  Guidance:landmark technique  Location:L4-L5  Needle Type:Concetta  Needle Gauge:25 G  Placement of Spinal needle event:cerebrospinal fluid aspirated  Paresthesia: no  Fluid Appearance:clear  Medications: bupivacaine PF (MARCAINE) 0.75 % injection, 1.8 mL  Med Administered at 1/20/2022 5:26 PM   Post Assessment  Patient Tolerance:patient tolerated the procedure well with no apparent complications  Complications no

## 2022-01-20 NOTE — ANESTHESIA PREPROCEDURE EVALUATION
Anesthesia Evaluation     Patient summary reviewed and Nursing notes reviewed   NPO Solid Status: > 8 hours  NPO Liquid Status: > 8 hours           Airway   Mallampati: III  Possible difficult intubation  Dental - normal exam     Pulmonary - negative pulmonary ROS and normal exam   Cardiovascular     Rhythm: regular  Rate: normal    (+) hypertension poorly controlled,       Neuro/Psych  (+) psychiatric history Anxiety,     GI/Hepatic/Renal/Endo    (+) obesity, morbid obesity,      Musculoskeletal (-) negative ROS    Abdominal    Substance History      OB/GYN    (+) Pregnant, pregnancy induced hypertension        Other - negative ROS                       Anesthesia Plan    ASA 3     spinal       Anesthetic plan, all risks, benefits, and alternatives have been provided, discussed and informed consent has been obtained with: patient.    Plan discussed with CRNA.        CODE STATUS:    Code Status (Patient has no pulse and is not breathing): CPR (Attempt to Resuscitate)  Medical Interventions (Patient has pulse or is breathing): Full

## 2022-01-20 NOTE — SIGNIFICANT NOTE
Dr Nicole at bedside to discuss plan of care with patient.  Pt opts to be given pain med and let Dr Nicole attempt rupture membranes.

## 2022-01-20 NOTE — PLAN OF CARE
Goal Outcome Evaluation:  Plan of Care Reviewed With: patient, significant other        Progress: no change  Outcome Summary: VSS, 1cm ROM @0073. Plans to restart Pitocin in a few hours. GBS +

## 2022-01-20 NOTE — PROGRESS NOTES
S/p Cervidil x12h, pitocin x36h, AROM clear fluid x12h  FHT: Cat 1 w/ toco difficult to trace  SVE 3/70/-2/soft/mid, vertex  Discussed w/ patient and her partner that she has been induced for nearly 48h with ruptured membranes for 24h.  Reviewed recommendation for  but if she would like to continue to try for induction, we could allow her more time given that she has a reassuring maternal and fetal status.  After consideration, she opted for  delivery.  Reviewed risks and benefits to include injury to surrounding organs (bowel, bladder, ureters, blood vessels, nerves, baby), infection, bleeding (possibly requiring blood transfusion and/or hysterectomy), and maternal/fetal death.  Anesthesia and NICU notified.  Ancef 3g and azithromycin 500mg ordered.    Sho Louise MD  2022  16:37 CST

## 2022-01-21 LAB
HCT VFR BLD AUTO: 30.2 % (ref 34–46.6)
HGB BLD-MCNC: 10 G/DL (ref 12–15.9)

## 2022-01-21 PROCEDURE — 99231 SBSQ HOSP IP/OBS SF/LOW 25: CPT | Performed by: OBSTETRICS & GYNECOLOGY

## 2022-01-21 PROCEDURE — 85014 HEMATOCRIT: CPT | Performed by: OBSTETRICS & GYNECOLOGY

## 2022-01-21 PROCEDURE — 25010000002 KETOROLAC TROMETHAMINE PER 15 MG: Performed by: OBSTETRICS & GYNECOLOGY

## 2022-01-21 PROCEDURE — 85018 HEMOGLOBIN: CPT | Performed by: OBSTETRICS & GYNECOLOGY

## 2022-01-21 RX ADMIN — OXYCODONE HYDROCHLORIDE 10 MG: 10 TABLET ORAL at 21:14

## 2022-01-21 RX ADMIN — KETOROLAC TROMETHAMINE 30 MG: 30 INJECTION, SOLUTION INTRAMUSCULAR; INTRAVENOUS at 06:00

## 2022-01-21 RX ADMIN — DOCUSATE SODIUM 100 MG: 100 CAPSULE, LIQUID FILLED ORAL at 12:26

## 2022-01-21 RX ADMIN — POLYETHYLENE GLYCOL 3350 17 G: 17 POWDER, FOR SOLUTION ORAL at 12:26

## 2022-01-21 RX ADMIN — CETIRIZINE HYDROCHLORIDE 5 MG: 5 TABLET ORAL at 12:26

## 2022-01-21 RX ADMIN — IBUPROFEN 800 MG: 800 TABLET, FILM COATED ORAL at 17:37

## 2022-01-21 RX ADMIN — Medication 1 TABLET: at 12:26

## 2022-01-21 RX ADMIN — SIMETHICONE 80 MG: 80 TABLET, CHEWABLE ORAL at 17:37

## 2022-01-21 RX ADMIN — SIMETHICONE 80 MG: 80 TABLET, CHEWABLE ORAL at 12:26

## 2022-01-21 RX ADMIN — SIMETHICONE 80 MG: 80 TABLET, CHEWABLE ORAL at 07:28

## 2022-01-21 NOTE — PLAN OF CARE
Goal Outcome Evaluation:  Plan of Care Reviewed With: patient, significant other        Progress: improving  Outcome Summary: VSS; patient had Primary C Section for failure to progress in labor.  LTV incision with sutures, and exofin dressing applied.  monahan in place, SCD's on patient.  Duramorph in spinal.  Patient in recovery.

## 2022-01-21 NOTE — PAYOR COMM NOTE
"Olive Baer  Case Management Extender  [phone] Phone  [phone] Fax    REF#[id number]  Adrian Rod (26 y.o. Female)             Date of Birth Social Security Number Address Home Phone MRN    [id number]  [address] [place] [id number] [phone] [id number]    Buddhism Marital Status             Jewish Single       Admission Date Admission Type Admitting Provider Attending Provider Department, Room/Bed    22 Elective Clayton Nicole DO Squires, Christopher C, DO [facility] MOTHER BABY, M755/1    Discharge Date Discharge Disposition Discharge Destination                         Attending Provider: Clayton Nicole DO    Allergies: No Known Allergies    Isolation: None   Infection: None   Code Status: CPR   Advance Care Planning Activity    Ht: 160 cm (63\")   Wt: 146 kg (322 lb)    Admission Cmt: None   Principal Problem: Gestational hypertension, third trimester [O13.3]                 Active Insurance as of 2022     Primary Coverage     Payor Plan Insurance Group Employer/Plan Group    ANTHEM MEDICAID ANTH MEDICAID [id number]     Payor Plan Address Payor Plan Phone Number Payor Plan Fax Number Effective Dates    [address] [address] [phone]  2021 - None Entered    [facility] [id number]       Subscriber Name Subscriber Birth Date Member ID       ADRIAN ROD 1995 [id number]                 Emergency Contacts      (Rel.) Home Phone Work Phone Mobile Phone    Marina Rod (Mother) -- -- [phone]    DENISE AVERY (Significant Other) [phone] -- [phone]               History & Physical      AscencionAnabella DO at 22 1847          27 yo  at 40+0 by L=9 presenting for IOL for elevated BP without the diagnosis of HTN with concern for developing GHTN at term.   Denies HA, CP, SOA, RUQ pain. Good FM. Denies ctx, LOF, or VB. Had elevated BP for first time today " 142/90 in clinic. No other mild range BPs yet.    Vitals:    22 1726   BP: 137/74   Pulse: 108     Gen: well appearing, NAD  CV: RRR  Chest: no increased work of breathing  Abd: nontender, gravid  Ext: nontender, trace edema    US Ob Follow Up Transabdominal Approach    Result Date: 2022  INDICATION: Growth, O36.63X0 Maternal care for excessive fetal growth, third trimester, not applicable or unspecified EXAMINATION: Ultrasound US PREGNANCY FOLLOW UP TECHNIQUE: Transabdominal pelvic ultrasound was performed. COMPARISON: Ultrasound examination 2021 ____________________________________________ LMP: Unknown. Provided EGA: 39 weeks 1 day FINDINGS: Study very limited by maternal body habitus. Measurements were difficult to obtain. INTRAUTERINE GESTATION(s): Single. FETAL HEART MOTION is 137 bpm. BIOMETRIC MEASUREMENTS: HEAD CIRCUMFERENCE: 36.21 cm which corresponds to greater than 40 weeks. BIPARIETAL DIAMETER: 9.16 cm which corresponds to greater than 97th percentile. ABDOMINAL CIRCUMFERENCE: 32.9 cm which corresponds to 36 weeks 6 days. FEMORAL LENGTH: 7.0 cm which corresponds to 35 weeks 6 days. Less than 3rd percentile ESTIMATED DUE DATE (ARMINDA): 2022 ESTIMATED FETAL AGE AND WEIGHT: 36 weeks 4 days. 3156 g. 24th percentile FETAL PRESENTATION:  Cephalic AMNIOTIC FLUID INDEX (CYNDI): 15.0 BIOPHYSICAL PROFILE (BPP): Not assessed. PLACENTA:  Posterior. There is no placenta previa or abruption. CERVIX:  Obscured by fetal head MATERNAL OVARIES: Not seen. FREE FLUID: None. Please note that a full anatomic survey was not performed.     Single live intrauterine pregnancy with an estimated gestational age of 36 weeks 4 days. There is significant discrepancy with prior measurements. Current measurements were extremely difficult to obtain. Accuracy of these measurements is uncertain.  Electronically signed by:  Josemanuel Steinberg MD  2022 3:55 PM [facility] Workstation: [id number]       27 yo  at 40+0 by L=9 presenting  for IOL for elevated BP without the diagnosis of HTN with concern for developing GHTN at term.   Pregnancy c/b obesity, abnormal 1 hr/normal 3 hr GTT, GBS+, S>D, elevated BP without dx of HTN  To start PCN for GBS ppx  Cervidil for induction: cervix /-3, anterior, medium consistency, confirmed Cephalic on US  Considering epidural when in pain    Electronically signed by Anabella Vegas DO at 22 9351   Source Note            Obstetric History and Physical    Chief Complaint   Patient presents with   • Routine Prenatal Visit           Patient is a 26 y.o. female  currently at 40w0d, who presents to the clinic for routine OB visit.  On evaluation patient was noted to be hypertensive with blood pressure at 142/90.  Given that the patient is 40 weeks, I recommended that she undergo induction of labor secondary to elevated blood pressure.  Patient felt comfortable with this plan.  Patient states positive fetal movement, denies loss of fluid or vaginal bleeding.  Patient denies any headache, vision changes or right upper quadrant pain at this time..    Her prenatal care is complicated by maternal obesity, and now hypertension in pregnancy although does not meet criteria for gestational hypertension yet     Obstetric History   # 1 - Date: None, Sex: None, Weight: None, GA: None, Delivery: None, Apgar1: None, Apgar5: None, Living: None, Birth Comments: None      The following portions of the patient's history were reviewed and updated as appropriate: current medications, allergies, past medical history, past surgical history, past family history, past social history and problem list .       Prenatal Information:  Prenatal Results     POC Urine Glucose/Protein     Test Value Reference Range Date Time    Urine Glucose        Urine Protein              Initial Prenatal Labs     Test Value Reference Range Date Time    Hemoglobin ^ 11.5 g/dL  21     Hematocrit ^ 35 %  21     Platelets ^ 302 K/µL  21      Rubella IgG        Hepatitis B SAg ^ Negative   06/17/21     Hepatitis C Ab  Non-Reactive  Non-Reactive 09/02/21 1005    RPR ^ Non-Reactive   06/17/21     ABO ^ O   06/17/21     Rh ^ Positive   06/17/21     Antibody Screen ^ Normal  Normal 06/17/21     HIV ^ non reactive   06/17/21     Urine Culture  50,000 CFU/mL Mixed Jessica Isolated   09/02/21 1005    Gonorrhea ^ negative   06/17/21     Chlamydia ^ negative   06/17/21     TSH  2.83 uIU/ml 0.46 - 4.68 12/22/14 0905    HgB A1c               2nd and 3rd Trimester     Test Value Reference Range Date Time    Hemoglobin (repeated)        Hematocrit (repeated)        Platelets  ^ 302 K/µL  06/17/21     GCT  148 mg/dL 65 - 139 09/21/21 1119    Antibody Screen (repeated)        GTT Fasting  90 mg/dL 65 - 94 09/27/21 0829    GTT 1 Hr  169 mg/dL 65 - 179 09/27/21 0829    GTT 2 Hr  137 mg/dL 65 - 154 09/27/21 0829    GTT 3 Hr  60 mg/dL 65 - 139 09/27/21 0829    Group B Strep  Positive  Negative 12/14/21 0916          Drug Screening     Test Value Reference Range Date Time    Amphetamine Screen  Negative  Negative 09/02/21 1005    Barbiturate Screen  Negative  Negative 09/02/21 1005    Benzodiazepine Screen  Negative  Negative 09/02/21 1005    Methadone Screen  Negative  Negative 09/02/21 1005    Phencyclidine Screen  Negative  Negative 09/02/21 1005    Opiates Screen  Negative  Negative 09/02/21 1005    THC Screen  Negative  Negative 09/02/21 1005    Cocaine Screen  Negative  Negative 09/02/21 1005    Propoxyphene Screen  Negative  Negative 09/02/21 1005    Buprenorphine Screen  Negative  Negative 09/02/21 1005    Methamphetamine Screen  Negative  Negative 09/02/21 1005    Oxycodone Screen  Negative  Negative 09/02/21 1005    Tricyclic Antidepressants Screen  Negative  Negative 09/02/21 1005          Other (Risk screening)     Test Value Reference Range Date Time    Varicella IgG        Parvovirus IgG        CMV IgG        Cystic Fibrosis        Hemoglobin electrophoresis         NIPT        MSAFP-4        AFP (for NTD only)              Legend    ^: Historical                      External Prenatal Results     Pregnancy Outside Results - Transcribed From Office Records - See Scanned Records For Details     Test Value Date Time    ABO ^ O  21     Rh ^ Positive  21     Antibody Screen ^ Normal  21     Varicella IgG       Rubella       Hgb ^ 11.5 g/dL 21     Hct ^ 35 % 21     Glucose Fasting GTT  90 mg/dL 21 0829    Glucose Tolerance Test 1 hour  169 mg/dL 21 0829    Glucose Tolerance Test 3 hour  60 mg/dL 21 0829    Gonorrhea (discrete) ^ negative  21     Chlamydia (discrete) ^ negative  21     RPR ^ Non-Reactive  21     VDRL       Syphilis Antibody       HBsAg ^ Negative  21     Herpes Simplex Virus PCR       Herpes Simplex VIrus Culture       HIV ^ non reactive  21     Hep C RNA Quant PCR       Hep C Antibody  Non-Reactive  21 1005    AFP       Group B Strep  Positive  21 0916    GBS Susceptibility to Clindamycin       GBS Susceptibility to Erythromycin       Fetal Fibronectin       Genetic Testing, Maternal Blood             Drug Screening     Test Value Date Time    Urine Drug Screen       Amphetamine Screen  Negative  21 1005    Barbiturate Screen  Negative  21 1005    Benzodiazepine Screen  Negative  21 1005    Methadone Screen  Negative  21 1005    Phencyclidine Screen  Negative  21 1005    Opiates Screen  Negative  21 1005    THC Screen  Negative  21 1005    Cocaine Screen       Propoxyphene Screen  Negative  21 1005    Buprenorphine Screen  Negative  21 1005    Methamphetamine Screen       Oxycodone Screen  Negative  21 1005    Tricyclic Antidepressants Screen  Negative  21 1005          Legend    ^: Historical                         Past OB History:     OB History    Para Term  AB Living   1 0 0 0 0 0   SAB  IAB Ectopic Molar Multiple Live Births   0 0 0 0 0 0      # Outcome Date GA Lbr Sagar/2nd Weight Sex Delivery Anes PTL Lv   1 Current                 ALLERGIES:   No Known Allergies     Home Medications:     Prior to Admission medications    Medication Sig Start Date End Date Taking? Authorizing Provider   cetirizine (zyrTEC) 5 MG tablet Take 1 tablet by mouth Daily. 11/16/21 11/16/22 Yes Clayton Nicole DO   docusate sodium (Colace) 100 MG capsule Take 1 capsule by mouth 2 (Two) Times a Day. 12/28/21  Yes Violet Singleton APRN   Prenatal Vit-Fe Fumarate-FA (prenatal vitamin 27-0.8) 27-0.8 MG tablet tablet Take 1 tablet by mouth Daily. 8/28/21  Yes Provider, MD Fan       Past Medical History: Past Medical History:   Diagnosis Date   • Anxiety       Past Surgical History Past Surgical History:   Procedure Laterality Date   • FOOT FRACTURE SURGERY  2021   • KNEE CARTILAGE SURGERY  2014      Family History: Family History   Problem Relation Age of Onset   • Hypertension Mother    • Hypertension Maternal Grandmother    • Diabetes Maternal Grandmother    • Depression Maternal Grandmother       Social History:  reports that she has never smoked. She has never used smokeless tobacco.   reports previous alcohol use.   reports no history of drug use.        Review of Systems   Constitutional: Negative for chills, fatigue and fever.   HENT: Negative for sore throat.    Eyes: Negative for visual disturbance.   Respiratory: Negative for cough, shortness of breath and wheezing.    Cardiovascular: Negative for chest pain, palpitations and leg swelling.   Gastrointestinal: Negative for abdominal pain, diarrhea, nausea and vomiting.   Endocrine: Negative for cold intolerance and heat intolerance.   Genitourinary: Negative for dysuria, flank pain, frequency, menstrual problem, pelvic pain, vaginal bleeding, vaginal discharge and vaginal pain.   Skin: Negative for color change and pallor.   Neurological:  Negative for syncope, light-headedness and headaches.   Psychiatric/Behavioral: Negative for dysphoric mood and suicidal ideas. The patient is not nervous/anxious.      Objective     Documented Vitals    22 1458   BP: 142/90   Weight: (!) 146 kg (322 lb)       OBGyn Exam  Constitutional: Appears to be in no acute distress; Eyes: sclera normal; Endocrine system: thyroid palpate is normal; Pulmonary system: lungs clear; Cardiovascular system: heart regular rate and rhythm; Gastrointestinal system: abdomen soft nontender, active bowel sounds; Urologic system: CVA negative; Psychiatric: appropriate insight; Neurologic: gait within normal limits, fetal heart rate 145 beats a minute.  Cervical exam deferred until admission      Last Labs  Lab Results   Component Value Date    WBC 10.04 (H) 2017    RBC 4.04 2017    HGB 11.5 2021    HCT 35 2021    MCV 87.6 2017    MCH 29.5 2017    MCHC 33.6 2017    RDW 12.1 2017    RDWSD 39.0 2017    MPV 10.4 2017     2021        Lab Results   Component Value Date    GLUCOSE 86 2014    BUN 16 2014    CREATININE 0.7 2014     2014    K 4.4 2014     2014    CO2 27 2014    CALCIUM 9.3 2014    PROTEINTOT 7.6 2014    ALBUMIN 4.1 2014    ALT 23 2014    AST 18 2014    ALKPHOS 93 2014    BILITOT 0.8 2014    ANIONGAP 13.0 2014       No results found for: HCGQUAL      Assessment/Plan:  1. 26 y.o. [id number].  Doing well with appropriately progressing pregnancy, now with elevated blood pressures in pregnancy at term, given this finding I recommended patient undergo induction of labor at this time.  Anticipate routine labor care, anticipate likely prolonged induction due to primipara with likely unfavorable cervix.  Recent growth scan showed reasonably sized fetus although patient is measuring larger than dates.  Plan to start  induction of labor with Cervidil and then will transition to Pitocin and possibly Dawson bulb tomorrow depending on how patient is doing.      Denise Rod and I have discussed pain goals for this hospitalization after reviewing her current clinical condition, medical history and prior pain experiences.  The goal is to keep her pain level tolerable.  To help achieve this, I plan to offer IV pain medication and epidural if patient desires..           This document has been electronically signed by Clayton Nicole DO on 2022 15:32 CST      Electronically signed by Clayton Nicole DO at 22 7394             Clayton Nicole DO at 22 5633            Obstetric History and Physical    Chief Complaint   Patient presents with   • Routine Prenatal Visit           Patient is a 26 y.o. female  currently at 40w0d, who presents to the clinic for routine OB visit.  On evaluation patient was noted to be hypertensive with blood pressure at 142/90.  Given that the patient is 40 weeks, I recommended that she undergo induction of labor secondary to elevated blood pressure.  Patient felt comfortable with this plan.  Patient states positive fetal movement, denies loss of fluid or vaginal bleeding.  Patient denies any headache, vision changes or right upper quadrant pain at this time..    Her prenatal care is complicated by maternal obesity, and now hypertension in pregnancy although does not meet criteria for gestational hypertension yet     Obstetric History   # 1 - Date: None, Sex: None, Weight: None, GA: None, Delivery: None, Apgar1: None, Apgar5: None, Living: None, Birth Comments: None      The following portions of the patient's history were reviewed and updated as appropriate: current medications, allergies, past medical history, past surgical history, past family history, past social history and problem list .       Prenatal Information:  Prenatal Results     POC Urine  Glucose/Protein     Test Value Reference Range Date Time    Urine Glucose        Urine Protein              Initial Prenatal Labs     Test Value Reference Range Date Time    Hemoglobin ^ 11.5 g/dL  06/17/21     Hematocrit ^ 35 %  06/17/21     Platelets ^ 302 K/µL  06/17/21     Rubella IgG        Hepatitis B SAg ^ Negative   06/17/21     Hepatitis C Ab  Non-Reactive  Non-Reactive 09/02/21 1005    RPR ^ Non-Reactive   06/17/21     ABO ^ O   06/17/21     Rh ^ Positive   06/17/21     Antibody Screen ^ Normal  Normal 06/17/21     HIV ^ non reactive   06/17/21     Urine Culture  50,000 CFU/mL Mixed Jessica Isolated   09/02/21 1005    Gonorrhea ^ negative   06/17/21     Chlamydia ^ negative   06/17/21     TSH  2.83 uIU/ml 0.46 - 4.68 12/22/14 0905    HgB A1c               2nd and 3rd Trimester     Test Value Reference Range Date Time    Hemoglobin (repeated)        Hematocrit (repeated)        Platelets  ^ 302 K/µL  06/17/21     GCT  148 mg/dL 65 - 139 09/21/21 1119    Antibody Screen (repeated)        GTT Fasting  90 mg/dL 65 - 94 09/27/21 0829    GTT 1 Hr  169 mg/dL 65 - 179 09/27/21 0829    GTT 2 Hr  137 mg/dL 65 - 154 09/27/21 0829    GTT 3 Hr  60 mg/dL 65 - 139 09/27/21 0829    Group B Strep  Positive  Negative 12/14/21 0916          Drug Screening     Test Value Reference Range Date Time    Amphetamine Screen  Negative  Negative 09/02/21 1005    Barbiturate Screen  Negative  Negative 09/02/21 1005    Benzodiazepine Screen  Negative  Negative 09/02/21 1005    Methadone Screen  Negative  Negative 09/02/21 1005    Phencyclidine Screen  Negative  Negative 09/02/21 1005    Opiates Screen  Negative  Negative 09/02/21 1005    THC Screen  Negative  Negative 09/02/21 1005    Cocaine Screen  Negative  Negative 09/02/21 1005    Propoxyphene Screen  Negative  Negative 09/02/21 1005    Buprenorphine Screen  Negative  Negative 09/02/21 1005    Methamphetamine Screen  Negative  Negative 09/02/21 1005    Oxycodone Screen  Negative   Negative 09/02/21 1005    Tricyclic Antidepressants Screen  Negative  Negative 09/02/21 1005          Other (Risk screening)     Test Value Reference Range Date Time    Varicella IgG        Parvovirus IgG        CMV IgG        Cystic Fibrosis        Hemoglobin electrophoresis        NIPT        MSAFP-4        AFP (for NTD only)              Legend    ^: Historical                      External Prenatal Results     Pregnancy Outside Results - Transcribed From Office Records - See Scanned Records For Details     Test Value Date Time    ABO ^ O  06/17/21     Rh ^ Positive  06/17/21     Antibody Screen ^ Normal  06/17/21     Varicella IgG       Rubella       Hgb ^ 11.5 g/dL 06/17/21     Hct ^ 35 % 06/17/21     Glucose Fasting GTT  90 mg/dL 09/27/21 0829    Glucose Tolerance Test 1 hour  169 mg/dL 09/27/21 0829    Glucose Tolerance Test 3 hour  60 mg/dL 09/27/21 0829    Gonorrhea (discrete) ^ negative  06/17/21     Chlamydia (discrete) ^ negative  06/17/21     RPR ^ Non-Reactive  06/17/21     VDRL       Syphilis Antibody       HBsAg ^ Negative  06/17/21     Herpes Simplex Virus PCR       Herpes Simplex VIrus Culture       HIV ^ non reactive  06/17/21     Hep C RNA Quant PCR       Hep C Antibody  Non-Reactive  09/02/21 1005    AFP       Group B Strep  Positive  12/14/21 0916    GBS Susceptibility to Clindamycin       GBS Susceptibility to Erythromycin       Fetal Fibronectin       Genetic Testing, Maternal Blood             Drug Screening     Test Value Date Time    Urine Drug Screen       Amphetamine Screen  Negative  09/02/21 1005    Barbiturate Screen  Negative  09/02/21 1005    Benzodiazepine Screen  Negative  09/02/21 1005    Methadone Screen  Negative  09/02/21 1005    Phencyclidine Screen  Negative  09/02/21 1005    Opiates Screen  Negative  09/02/21 1005    THC Screen  Negative  09/02/21 1005    Cocaine Screen       Propoxyphene Screen  Negative  09/02/21 1005    Buprenorphine Screen  Negative  09/02/21 1005     Methamphetamine Screen       Oxycodone Screen  Negative  21 1005    Tricyclic Antidepressants Screen  Negative  21 1005          Legend    ^: Historical                         Past OB History:     OB History    Para Term  AB Living   1 0 0 0 0 0   SAB IAB Ectopic Molar Multiple Live Births   0 0 0 0 0 0      # Outcome Date GA Lbr Sagar/2nd Weight Sex Delivery Anes PTL Lv   1 Current                 ALLERGIES:   No Known Allergies     Home Medications:     Prior to Admission medications    Medication Sig Start Date End Date Taking? Authorizing Provider   cetirizine (zyrTEC) 5 MG tablet Take 1 tablet by mouth Daily. 21 Yes Clayton Nicole DO   docusate sodium (Colace) 100 MG capsule Take 1 capsule by mouth 2 (Two) Times a Day. 21  Yes Violet Singleton APRN   Prenatal Vit-Fe Fumarate-FA (prenatal vitamin 27-0.8) 27-0.8 MG tablet tablet Take 1 tablet by mouth Daily. 21  Yes Provider, MD Fan       Past Medical History: Past Medical History:   Diagnosis Date   • Anxiety       Past Surgical History Past Surgical History:   Procedure Laterality Date   • FOOT FRACTURE SURGERY     • KNEE CARTILAGE SURGERY        Family History: Family History   Problem Relation Age of Onset   • Hypertension Mother    • Hypertension Maternal Grandmother    • Diabetes Maternal Grandmother    • Depression Maternal Grandmother       Social History:  reports that she has never smoked. She has never used smokeless tobacco.   reports previous alcohol use.   reports no history of drug use.        Review of Systems   Constitutional: Negative for chills, fatigue and fever.   HENT: Negative for sore throat.    Eyes: Negative for visual disturbance.   Respiratory: Negative for cough, shortness of breath and wheezing.    Cardiovascular: Negative for chest pain, palpitations and leg swelling.   Gastrointestinal: Negative for abdominal pain, diarrhea, nausea and vomiting.    Endocrine: Negative for cold intolerance and heat intolerance.   Genitourinary: Negative for dysuria, flank pain, frequency, menstrual problem, pelvic pain, vaginal bleeding, vaginal discharge and vaginal pain.   Skin: Negative for color change and pallor.   Neurological: Negative for syncope, light-headedness and headaches.   Psychiatric/Behavioral: Negative for dysphoric mood and suicidal ideas. The patient is not nervous/anxious.      Objective     Documented Vitals    22 1458   BP: 142/90   Weight: (!) 146 kg (322 lb)       OBGyn Exam  Constitutional: Appears to be in no acute distress; Eyes: sclera normal; Endocrine system: thyroid palpate is normal; Pulmonary system: lungs clear; Cardiovascular system: heart regular rate and rhythm; Gastrointestinal system: abdomen soft nontender, active bowel sounds; Urologic system: CVA negative; Psychiatric: appropriate insight; Neurologic: gait within normal limits, fetal heart rate 145 beats a minute.  Cervical exam deferred until admission      Last Labs  Lab Results   Component Value Date    WBC 10.04 (H) 2017    RBC 4.04 2017    HGB 11.5 2021    HCT 35 2021    MCV 87.6 2017    MCH 29.5 2017    MCHC 33.6 2017    RDW 12.1 2017    RDWSD 39.0 2017    MPV 10.4 2017     2021        Lab Results   Component Value Date    GLUCOSE 86 2014    BUN 16 2014    CREATININE 0.7 2014     2014    K 4.4 2014     2014    CO2 27 2014    CALCIUM 9.3 2014    PROTEINTOT 7.6 2014    ALBUMIN 4.1 2014    ALT 23 2014    AST 18 2014    ALKPHOS 93 2014    BILITOT 0.8 2014    ANIONGAP 13.0 2014       No results found for: HCGQUAL      Assessment/Plan:  2. 26 y.o. [id number].  Doing well with appropriately progressing pregnancy, now with elevated blood pressures in pregnancy at term, given this finding I recommended  patient undergo induction of labor at this time.  Anticipate routine labor care, anticipate likely prolonged induction due to primipara with likely unfavorable cervix.  Recent growth scan showed reasonably sized fetus although patient is measuring larger than dates.  Plan to start induction of labor with Cervidil and then will transition to Pitocin and possibly Dawson bulb tomorrow depending on how patient is doing.      Denise Rod and I have discussed pain goals for this hospitalization after reviewing her current clinical condition, medical history and prior pain experiences.  The goal is to keep her pain level tolerable.  To help achieve this, I plan to offer IV pain medication and epidural if patient desires..           This document has been electronically signed by Clayton Nicole DO on January 18, 2022 15:32 CST      Electronically signed by Clayton Nicole DO at 01/18/22 1534       Lab Results (last 48 hours)     Procedure Component Value Units Date/Time    Hemoglobin & Hematocrit, Blood [[id number]]  (Abnormal) Collected: 01/21/22 0345    Specimen: Blood Updated: 01/21/22 0405     Hemoglobin 10.0 g/dL      Hematocrit 30.2 %           Imaging Results (Last 48 Hours)     ** No results found for the last 48 hours. **        ECG/EMG Results (last 48 hours)     ** No results found for the last 48 hours. **           Operative/Procedure Notes (last 48 hours)      Sho Louise MD at 01/20/22 1830        [facility]  Operative Report    Name: Denise Rod  MRN: [id number]  Date: 1/20/2022  CSN: [id number]      Location: [facility] LABOR DELIVERY    Service: Obstetrics    Pre-op Diagnosis:   1.  IUP at 40w2d   2.  Gestational hypertension  3.  Class III obesity, current BMI 57.04  4.  Failure to progress/failed induction of labor    Post-op Diagnosis: Same, in addition to delivered    Surgeon: Sho Louise MD FACOG    Assistant: Kathy  ANGEL Yeager, MS3    Staff:  Circulator: Alma Hensley RN  Scrub Person: Fatimah Unger  L & MARGARITO Nurse: Silver Villanueva RN  Assistant: Kathy Yeager CSA    Anesthesia: Spinal w/ Duramorph    Anesthesia Staff:  CRNA: Patrick Baer CRNA    Operation: Primary low-transverse  section    Drains: Dawson catheter, draining clear yellow urine    Complications: None    Findings: Obese abdomen.  Subcutaneous layer approximately 5-6 cm deep.  Delivery of viable female  weighing 3460 grams with Apgars of 8 and 9.  Bilateral fallopian tubes and ovaries normal.    Condition: Stable    Specimens/Disposition: Placenta and umbilical cord, discard    Estimated Blood Loss: 600 mL  Quantitative Blood Loss: 649 mL  IV Fluids: 1700 mL crystalloid  Urine Output: 200 mL    Indications: Denise Rod, 26 y.o., G1 at 40w2d with gestational hypertension diagnosed in the clinic who presented for induction of labor.  Over the course of 48 hours of induction, she received Cervidil x12 hours, pitocin x36 hours with amniotomy for nearly 24 hours; she only progressed to 3 cm.  At this time, decision made to proceed with  delivery.    Description of Operation:  The patient was identified and the procedure verified as a  section delivery.  The patient was given spinal anesthesia.  Patient prepared and draped in normal sterile fashion in dorsal supine position with a leftward tilt.  A transverse skin incision was made with the scalpel and carried down through the subcutaneous tissue to the fascia using the Bovie.  Fascial incision was made with the Bovie and extended transversely with Mayorga scissors.  The superior aspect of the fascia was grasped with Kocher clamps and the rectus muscle was dissected off bluntly and sharply with Mayorga scissors.  The inferior aspect of the fascia was then grasped with Kocher clamps and the rectus muscle and pyramidalis were dissected off bluntly  and then sharply with Mayorga scissors.  The rectus muscle was then  in the midline and the peritoneum was identified and entered bluntly.  The peritoneal incision was extended transversely.  Initially the Alexxus retractor was inserted.  Bladder blade and retractor were inserted.  The uterovesical peritoneal reflection was identified and incised transversely with Metzenbaum scissors.  The bladder flap was bluntly freed from the lower uterine segment. The bladder blade was adjusted.  A low transverse uterine incision was made with a scalpel and the uterine incision was extended with upward traction.  The amniotic sac was ruptured with an Allis clamp.  While attempting to deliver, the self-retaining retractor was hindering delivery and subsequently removed.  Delivered from cephalic presentation was a live female  weighing 3640 grams with Apgar scores of 8 at one minute and 9 at five minutes.  Cord clamped and cut and infant with bulb suction were handed to awaiting staff.  Cord blood was obtained and sent.  The placenta was removed intact and appeared normal.  Uterus exteriorized and cleared of all clots and debris.  The uterus, tubes and ovaries appeared normal.  The uterine incision was closed with running locked sutures of 0-Vicryl and imbricated with 0-Vicryl.  Posterior cul-de-sac was cleared.  Uterus was reinserted atraumatically.  Hemostasis was observed.  Bilateral paracolic gutters cleared.  Inspection of the abdomen and pelvis prior to abdominal wall closure revealed no evidence of retained instruments or sponges.  The fascia was then reapproximated with running sutures of 0-Vicryl.  Subcutaneous layer closed with 2-0 plain gut.  The skin was reapproximated with 3-0 Monocryl in subcuticular fashion.  Exofin dressing applied.  There were no intraoperative complications and patient tolerated the procedure well.  The patient was escorted to her room in stable condition.    The patient received  Ancef 3g and azithromycin 500mg for antibiotic prophylaxis prior to the start of the procedure.    Kathy Yeager, CST CSFA was responsible for performing the following activities: Retraction, Suction, Irrigation, Suturing, Closing, Placing Dressing and Delivery of Fetus and their skilled assistance was necessary for the success of this case.    This document has been electronically signed by Sho Louise MD on 2022 18:30 CST.    Electronically signed by Sho Louise MD at 22     Sho Louise MD at 22 1837          [facility]   Delivery Note   Review the Delivery Report for details.       Delivery     Delivery: , Low Transverse     YOB: 2020    Time of Birth:  Gestational Age 5:50 PM   40w2d     Anesthesia: Spinal     Delivering clinician: Sho Louise    Forceps?   No   Vacuum? No    Shoulder dystocia present: No        Delivery narrative:  See operative report    Infant    Findings: female  infant     Infant observations: Weight: 3460 g (7 lb 10.1 oz)   Length: 20.472  in  Observations/Comments:        Apgars: 8  @ 1 minute /    9  @ 5 minutes   Infant Name: Estefani Freeman     Placenta, Cord, and Fluid    Placenta delivered  Expressed  at   2022  5:53 PM     Cord: 3 vessels  present.   Nuchal Cord?  no   Cord blood obtained: Yes    Cord gases obtained:  No    Cord gas results: Venous:  No results found for: PHCVEN    Arterial:  No results found for: PHCART     Repair    Episiotomy: None    No    Lacerations: No   Estimated Blood Loss:  600 cc             Quantitative Blood Loss:              Complications  hypertension, prolonged first stage, prolonged ROM    Disposition  Mother to Mother Baby/Postpartum in stable condition currently.  Baby to NICU in stable condition currently.    Sho Louise MD  22  18:37 CST    Electronically signed by Sho Louise MD at  22          Physician Progress Notes (last 48 hours)      Sho Louise MD at 22 1628        S/p Cervidil x12h, pitocin x36h, AROM clear fluid x12h  FHT: Cat 1 w/ toco difficult to trace  SVE 3/70/-2/soft/mid, vertex  Discussed w/ patient and her partner that she has been induced for nearly 48h with ruptured membranes for 24h.  Reviewed recommendation for  but if she would like to continue to try for induction, we could allow her more time given that she has a reassuring maternal and fetal status.  After consideration, she opted for  delivery.  Reviewed risks and benefits to include injury to surrounding organs (bowel, bladder, ureters, blood vessels, nerves, baby), infection, bleeding (possibly requiring blood transfusion and/or hysterectomy), and maternal/fetal death.  Anesthesia and NICU notified.  Ancef 3g and azithromycin 500mg ordered.    Sho Louise MD  2022  16:37 CST     Electronically signed by Sho Louise MD at 22 1641       Medical Student Notes (last 48 hours)  Notes from 22 0847 through 22 0847   No notes of this type exist for this encounter.       Consult Notes (last 48 hours)  Notes from 22 0847 through 22 0847   No notes of this type exist for this encounter.

## 2022-01-21 NOTE — ANESTHESIA POSTPROCEDURE EVALUATION
Patient: Denise Rod    Procedure Summary     Date: 22 Room / Location: [facility] LABOR DELIVERY  [facility] LABOR DELIVERY    Anesthesia Start:  Anesthesia Stop:     Procedure:  SECTION PRIMARY (N/A Abdomen) Diagnosis:     Surgeons: Sho Louise MD Provider: Patrick Baer CRNA    Anesthesia Type: spinal ASA Status: 3          Anesthesia Type: spinal    Vitals  Vitals Value Taken Time   /75 22   Temp     Pulse 78 22   Resp     SpO2 99 % 22   Vitals shown include unvalidated device data.        Post Anesthesia Care and Evaluation    Patient location during evaluation: bedside  Patient participation: complete - patient participated  Level of consciousness: awake and alert  Pain management: adequate  Airway patency: patent  Anesthetic complications: No anesthetic complications  PONV Status: none  Cardiovascular status: acceptable  Respiratory status: acceptable  Hydration status: acceptable  Post Neuraxial Block status: No signs or symptoms of PDPH  Comments: --------------------            22   --------------------   BP:       138/66     Pulse:      82       Resp:         18       Temp:                SpO2:    97%           --------------------

## 2022-01-21 NOTE — PROGRESS NOTES
[facility]  Progress Note - Obstetrics    Name: Denise Rod  MRN: [id number]  Location: [facility]  Date: 2022  CSN: [id number]    POD #1 s/p primary LTCS at 40w2d secondary to failed induction of labor,     Patient seen and examined.  No complaints.  Pain well controlled.  Tolerating diet.  No nausea or vomiting.  No headache, dizziness, chest pain, or shortness of breath.  no flatus, no bowel movement.  Dawson still in place, has not ambulated yet.  Lochia minimal.  Plans on breastfeeding.    PHYSICAL EXAM  BP 99/56 (BP Location: Right arm, Patient Position: Sitting)   Pulse 102   Temp 98.5 °F (36.9 °C) (Oral)   Resp 20   LMP 2021 (Exact Date)   SpO2 100%   Breastfeeding No   General: No apparent distress.  Alert and cooperative.  Pleasant.  Heart: Regular rate and rhythm.  No murmurs, rubs, or gallops.  Lungs: Clear to auscultation bilaterally.  No wheezes, rales, or rhonchi.  Abdomen: Soft. tender to palpation.  No rebound tenderness or guarding.  +BS.  Dressing/Incision: Clean, dry, and intact.  No erythema or warmth.   Extremities: +2/4 posterior tibial.  no pitting edema in lower extremities.  No calf tenderness.  Uterus: Uterine fundus firm, non-tender and below umbilicus.      Intake/Output Summary (Last 24 hours) at 2022 0645  Last data filed at 2022 0600  Gross per 24 hour   Intake 3000 ml   Output 2080 ml   Net 920 ml     LABS  Hgb: 10.4 -> 10.0    IMPRESSION  Denise Rod is a 26 y.o.  POD #1 s/p primary LTCS at 40w2d secondary to failed induction.  Doing well and recovering appropriately.    PLAN  1.  Following surgery  - Continue routine post op care  - Encourage OOB and ambulation  - Encourage incentive spirometry  - Diet: Pregnancy/breastfeeding  - DVT prophylaxis: SCD and encourage ambulation  - Contraception: OCP  - Breastfeeding  - 1 week, 6 week follow-up for postpartum.    This document has been electronically  signed by Chris Box, Medical Student on 2022 06:45 CST.    Attending Attestation  I have seen and evaluated the patient at 0730.  I have discussed the case with the resident.  I have reviewed the notes, assessment and plan, and/or procedures performed by the resident.  I concur with the resident’s documentation.    Patient seen and examined.  Doing well and recovering appropriately.  Denies flatus, denies BM but tolerating diet w/o nausea or vomiting.  Lochia minimal.  Dawson still in place on exam.  Has not ambulated yet.  Breast/bottlefeeding.    /81 (BP Location: Right arm, Patient Position: Lying)   Pulse 85   Temp 98 °F (36.7 °C) (Oral)   Resp 18   LMP 2021 (Exact Date)   SpO2 98%   Breastfeeding No   Gen: NAD, AAO x3  Abd: Soft, NTND, uterus nontender and FFBU, incision c/d/I    Hgb: 10.4 > 10.0    A/P: Denise Rod is a 26 y.o.  POD #1 s/p PLTCS at 40w2d secondary to failed induction of labor secondary to GHTN.  Doing well and recovering appropriately.  - Contraception: Undecided  - Breast/Bottlefeeding  - Discharge to home POD #2-4  - BP controlled w/o anti-hypertensive therapy    This document has been electronically signed by Sho Louise MD on 2022 21:59 CST.

## 2022-01-21 NOTE — OP NOTE
[facility]  Operative Report    Name: Denise Rod  MRN: [id number]  Date: 2022  CSN: [id number]      Location: [facility] LABOR DELIVERY    Service: Obstetrics    Pre-op Diagnosis:   1.  IUP at 40w2d   2.  Gestational hypertension  3.  Class III obesity, current BMI 57.04  4.  Failure to progress/failed induction of labor    Post-op Diagnosis: Same, in addition to delivered    Surgeon: Sho Louise MD FACOG    Assistant: ANGEL Esposito MS3    Staff:  Circulator: Alma Hensley RN  Scrub Person: Fatimah Unger  L & MARGARITO Nurse: Silver Villanueva RN  Assistant: Kathy Yeager CSA    Anesthesia: Spinal w/ Duramorph    Anesthesia Staff:  CRNA: Patrick Baer CRNA    Operation: Primary low-transverse  section    Drains: Dawson catheter, draining clear yellow urine    Complications: None    Findings: Obese abdomen.  Subcutaneous layer approximately 5-6 cm deep.  Delivery of viable female  weighing 3460 grams with Apgars of 8 and 9.  Bilateral fallopian tubes and ovaries normal.    Condition: Stable    Specimens/Disposition: Placenta and umbilical cord, discard    Estimated Blood Loss: 600 mL  Quantitative Blood Loss: 649 mL  IV Fluids: 1700 mL crystalloid  Urine Output: 200 mL    Indications: Denise Rod, 26 y.o., G1 at 40w2d with gestational hypertension diagnosed in the clinic who presented for induction of labor.  Over the course of 48 hours of induction, she received Cervidil x12 hours, pitocin x36 hours with amniotomy for nearly 24 hours; she only progressed to 3 cm.  At this time, decision made to proceed with  delivery.    Description of Operation:  The patient was identified and the procedure verified as a  section delivery.  The patient was given spinal anesthesia.  Patient prepared and draped in normal sterile fashion in dorsal supine position with a leftward tilt.  A transverse skin incision was  made with the scalpel and carried down through the subcutaneous tissue to the fascia using the Bovie.  Fascial incision was made with the Bovie and extended transversely with Mayorga scissors.  The superior aspect of the fascia was grasped with Kocher clamps and the rectus muscle was dissected off bluntly and sharply with Mayorga scissors.  The inferior aspect of the fascia was then grasped with Kocher clamps and the rectus muscle and pyramidalis were dissected off bluntly and then sharply with Mayorga scissors.  The rectus muscle was then  in the midline and the peritoneum was identified and entered bluntly.  The peritoneal incision was extended transversely.  Initially the Alexxus retractor was inserted.  Bladder blade and retractor were inserted.  The uterovesical peritoneal reflection was identified and incised transversely with Metzenbaum scissors.  The bladder flap was bluntly freed from the lower uterine segment. The bladder blade was adjusted.  A low transverse uterine incision was made with a scalpel and the uterine incision was extended with upward traction.  The amniotic sac was ruptured with an Allis clamp.  While attempting to deliver, the self-retaining retractor was hindering delivery and subsequently removed.  Delivered from cephalic presentation was a live female  weighing 3640 grams with Apgar scores of 8 at one minute and 9 at five minutes.  Cord clamped and cut and infant with bulb suction were handed to awaiting staff.  Cord blood was obtained and sent.  The placenta was removed intact and appeared normal.  Uterus exteriorized and cleared of all clots and debris.  The uterus, tubes and ovaries appeared normal.  The uterine incision was closed with running locked sutures of 0-Vicryl and imbricated with 0-Vicryl.  Posterior cul-de-sac was cleared.  Uterus was reinserted atraumatically.  Hemostasis was observed.  Bilateral paracolic gutters cleared.  Inspection of the abdomen and pelvis  prior to abdominal wall closure revealed no evidence of retained instruments or sponges.  The fascia was then reapproximated with running sutures of 0-Vicryl.  Subcutaneous layer closed with 2-0 plain gut.  The skin was reapproximated with 3-0 Monocryl in subcuticular fashion.  Exofin dressing applied.  There were no intraoperative complications and patient tolerated the procedure well.  The patient was escorted to her room in stable condition.    The patient received Ancef 3g and azithromycin 500mg for antibiotic prophylaxis prior to the start of the procedure.    Kathy Yeager CST CSFA was responsible for performing the following activities: Retraction, Suction, Irrigation, Suturing, Closing, Placing Dressing and Delivery of Fetus and their skilled assistance was necessary for the success of this case.    This document has been electronically signed by Sho Louise MD on January 20, 2022 18:30 CST.

## 2022-01-22 PROCEDURE — 94760 N-INVAS EAR/PLS OXIMETRY 1: CPT

## 2022-01-22 PROCEDURE — 94799 UNLISTED PULMONARY SVC/PX: CPT

## 2022-01-22 PROCEDURE — 99231 SBSQ HOSP IP/OBS SF/LOW 25: CPT | Performed by: STUDENT IN AN ORGANIZED HEALTH CARE EDUCATION/TRAINING PROGRAM

## 2022-01-22 RX ADMIN — DOCUSATE SODIUM 100 MG: 100 CAPSULE, LIQUID FILLED ORAL at 09:25

## 2022-01-22 RX ADMIN — OXYCODONE HYDROCHLORIDE 10 MG: 10 TABLET ORAL at 09:26

## 2022-01-22 RX ADMIN — IBUPROFEN 800 MG: 800 TABLET, FILM COATED ORAL at 06:12

## 2022-01-22 RX ADMIN — SIMETHICONE 80 MG: 80 TABLET, CHEWABLE ORAL at 17:36

## 2022-01-22 RX ADMIN — OXYCODONE HYDROCHLORIDE 10 MG: 10 TABLET ORAL at 17:36

## 2022-01-22 RX ADMIN — IBUPROFEN 800 MG: 800 TABLET, FILM COATED ORAL at 23:17

## 2022-01-22 RX ADMIN — Medication 1 TABLET: at 09:26

## 2022-01-22 RX ADMIN — SIMETHICONE 80 MG: 80 TABLET, CHEWABLE ORAL at 09:26

## 2022-01-22 NOTE — PLAN OF CARE
Problem: Adult Inpatient Plan of Care  Goal: Plan of Care Review  Outcome: Ongoing, Progressing  Flowsheets  Taken 1/22/2022 0601 by Rubi Cuellar, RN  Progress: improving  Outcome Summary: VSS, pt denies pain, ambulating back and forth from NICU.  Taken 1/20/2022 1900 by Alma Hensley, RN  Plan of Care Reviewed With:   patient   significant other   Goal Outcome Evaluation:           Progress: improving  Outcome Summary: VSS, pt denies pain, ambulating back and forth from NICU.

## 2022-01-22 NOTE — PROGRESS NOTES
[facility]  Progress Note - Obstetrics    Name: Denise Rod  MRN: [id number]  Location:   Date: 2022  CSN: [id number]    POD #2 s/p primary LTCS at 40w2d secondary to failed induction of labor,     Patient seen and examined.  No complaints.  Pain well controlled.  Tolerating diet.  No nausea or vomiting.  No headache, dizziness, chest pain, or shortness of breath.  Passing flatus, no bowel movement.  Up and out of bed and ambulating.  Voiding without difficulty.  Lochia similar to menses.  Breastfeeding and supplementing with formula. Infant was in NICU but came to patient room today. Plan is for likely DC of infant and patient tomorrow. Mood good.     PHYSICAL EXAM  /78 (BP Location: Right arm, Patient Position: Lying)   Pulse 105   Temp 100.1 °F (37.8 °C) (Oral)   Resp 18   LMP 2021 (Exact Date)   SpO2 100%   Breastfeeding No   General: No apparent distress.  Alert and cooperative.  Pleasant.  Heart: Regular rate and rhythm.    Lungs: Clear to auscultation. No increased work of breathing.  Abdomen: Soft.  Appropriately tender to palpation.  No rebound tenderness or guarding.   Dressing/Incision: Clean, dry, and intact.  No erythema or warmth.  Exofin in place.  Extremities:  No pitting edema in lower extremities.  No calf tenderness.  Uterus: Uterine fundus firm, non-tender and below umbilicus.    No intake or output data in the 24 hours ending 22 0833  LABS  Hgb: 10.4>10    IMPRESSION  Denise Rod is a 26 y.o.  POD #2 s/p PLTCS secondary to failed induction of labor.  Doing well, meeting discharge criteria, working on breastfeeding, plan for DC home tomorrow, likely with infant if discharged.    PLAN  1.  Following surgery  - Continue routine post op care  - Encourage OOB and ambulation  - Encourage incentive spirometry  - Diet: Pregnancy/breastfeeding  - DVT prophylaxis: SCD and encourage ambulation  - Contraception: POP,  would likely like RX at discharge although may wait to start until 6 w pp visit  - Breastfeeding with formula supplementation  - 1 week, 6 week follow-up for postpartum.      This document has been electronically signed by Anabella Vegas DO on January 22, 2022 08:33 CST

## 2022-01-22 NOTE — PLAN OF CARE
Problem: Adult Inpatient Plan of Care  Goal: Plan of Care Review  Outcome: Ongoing, Progressing  Flowsheets  Taken 2022 1838 by Janel Guillen, RN  Outcome Summary: VSS, pain managed, been seeing baby in nicu, monahan was removed, voided, eating well  Taken 2022 1900 by Alma Hensley, RN  Progress: improving  Plan of Care Reviewed With:   patient   significant other  Goal: Patient-Specific Goal (Individualized)  Outcome: Ongoing, Progressing  Goal: Absence of Hospital-Acquired Illness or Injury  Outcome: Ongoing, Progressing  Intervention: Identify and Manage Fall Risk  Recent Flowsheet Documentation  Taken 2022 1735 by Janel Guillen, RN  Safety Promotion/Fall Prevention: safety round/check completed  Goal: Optimal Comfort and Wellbeing  Outcome: Ongoing, Progressing  Goal: Readiness for Transition of Care  Outcome: Ongoing, Progressing     Problem: Adjustment to Role Transition (Postpartum  Delivery)  Goal: Successful Maternal Role Transition  Outcome: Ongoing, Progressing     Problem: Bleeding (Postpartum  Delivery)  Goal: Hemostasis  Outcome: Ongoing, Progressing     Problem: Infection (Postpartum  Delivery)  Goal: Absence of Infection Signs and Symptoms  Outcome: Ongoing, Progressing     Problem: Pain (Postpartum  Delivery)  Goal: Acceptable Pain Control  Outcome: Ongoing, Progressing     Problem: Postoperative Nausea and Vomiting (Postpartum  Delivery)  Goal: Nausea and Vomiting Relief  Outcome: Ongoing, Progressing     Problem: Postoperative Urinary Retention (Postpartum  Delivery)  Goal: Effective Urinary Elimination  Outcome: Ongoing, Progressing     Problem:  Fall Injury Risk  Goal: Absence of Fall, Infant Drop and Related Injury  Outcome: Ongoing, Progressing  Intervention: Promote Injury-Free Environment  Recent Flowsheet Documentation  Taken 2022 1735 by Janel Guillen, RN  Safety Promotion/Fall Prevention: safety  round/check completed     Problem: Skin Injury Risk Increased  Goal: Skin Health and Integrity  Outcome: Ongoing, Progressing   Goal Outcome Evaluation:              Outcome Summary: VSS, pain managed, been seeing baby in nicu, monahan was removed, voided, eating well

## 2022-01-23 VITALS
RESPIRATION RATE: 18 BRPM | DIASTOLIC BLOOD PRESSURE: 86 MMHG | SYSTOLIC BLOOD PRESSURE: 139 MMHG | TEMPERATURE: 97.9 F | HEART RATE: 73 BPM | OXYGEN SATURATION: 99 %

## 2022-01-23 PROCEDURE — 99238 HOSP IP/OBS DSCHRG MGMT 30/<: CPT | Performed by: STUDENT IN AN ORGANIZED HEALTH CARE EDUCATION/TRAINING PROGRAM

## 2022-01-23 RX ORDER — ACETAMINOPHEN AND CODEINE PHOSPHATE 120; 12 MG/5ML; MG/5ML
1 SOLUTION ORAL DAILY
Qty: 28 TABLET | Refills: 5 | Status: SHIPPED | OUTPATIENT
Start: 2022-01-23 | End: 2022-03-08

## 2022-01-23 RX ORDER — ACETAMINOPHEN 325 MG/1
650 TABLET ORAL EVERY 4 HOURS PRN
Qty: 100 TABLET | Refills: 2 | Status: SHIPPED | OUTPATIENT
Start: 2022-01-23 | End: 2022-11-14

## 2022-01-23 RX ORDER — IBUPROFEN 800 MG/1
800 TABLET ORAL EVERY 8 HOURS PRN
Qty: 40 TABLET | Refills: 1 | Status: SHIPPED | OUTPATIENT
Start: 2022-01-23 | End: 2022-11-14

## 2022-01-23 RX ORDER — DOCUSATE SODIUM 100 MG/1
100 CAPSULE, LIQUID FILLED ORAL 2 TIMES DAILY
Qty: 60 CAPSULE | Refills: 2 | Status: SHIPPED | OUTPATIENT
Start: 2022-01-23 | End: 2022-09-04

## 2022-01-23 RX ORDER — FERROUS SULFATE 325(65) MG
325 TABLET ORAL
Qty: 30 TABLET | Refills: 1 | Status: SHIPPED | OUTPATIENT
Start: 2022-01-23 | End: 2022-03-08

## 2022-01-23 RX ORDER — OXYCODONE HYDROCHLORIDE 5 MG/1
5 TABLET ORAL EVERY 4 HOURS PRN
Qty: 20 TABLET | Refills: 0 | Status: SHIPPED | OUTPATIENT
Start: 2022-01-23 | End: 2022-01-28

## 2022-01-23 RX ADMIN — Medication 1 TABLET: at 08:14

## 2022-01-23 RX ADMIN — IBUPROFEN 800 MG: 800 TABLET, FILM COATED ORAL at 06:27

## 2022-01-23 NOTE — DISCHARGE SUMMARY
[facility]  Discharge Summary  Patient Name: Denise Rod  : 1995  MRN: [id number]  CSN: [id number]    Discharge Summary    Date of Admission: 2022   Date of Discharge: 2022    Principle Discharge Dx: Active Hospital Problems    Diagnosis  POA   • **Gestational hypertension, third trimester [O13.3]  Yes   • Single delivery by  section [O82]  No   • Obesity affecting pregnancy in third trimester [O99.213]  Yes   • Supervision of high risk pregnancy in third trimester [O09.93]  Not Applicable      Procedures Performed: Procedure(s):   SECTION PRIMARY   Brief History: Patient is a 26 y.o. now  who presented to labor and delivery for IOL for GHTN.   Hospital Course: Patient presented for IOL but had failed IOL.  Over the course of 48 hours of induction, she received Cervidil x12 hours, pitocin x36 hours with amniotomy for nearly 24 hours; she only progressed to 3 cm.  At this time, decision made to proceed with  delivery. She had a PLTCS.  Her postoperative course was unremarkable.  On POD #3 she expressed the desire for discharge.  She had passed gas and had a BM and was urinating normally.  She was eating a regular diet without difficulty.  She was ambulating well.  Her incision was clean, dry and intact.  Patient was primarily bottlefeeding as milk had not yet come in but was still working on trying to breastfeed. She desired POPs for contraception which were sent to her pharmacy. Discharge instructions were given.  All questions were answered.    Vitals:    22 1804 22 2319 22 0632 22 0819   BP: 145/80 129/69 126/77 139/86   BP Location: Right arm Left arm Right arm Right arm   Patient Position: Sitting Sitting Sitting Sitting   Pulse: 108 98 88 73   Resp: 18 20 18 18   Temp: 97.5 °F (36.4 °C) 98.2 °F (36.8 °C) 97.7 °F (36.5 °C) 97.9 °F (36.6 °C)   TempSrc: Oral Oral Oral Oral   SpO2: 100% 100%  99%     Gen: well  appearing, NAD  CV: RRR  Chest: no increased work of breathing  Abd: soft, appropriately tender, mildly distended, no rebound or guarding, fundus firm below umbilicus, incision c/d/i  Ext: 1+ BLE edema equal bilaterally, nontender      Condition:  Discharge Activity:  Discharge Diet: Stable  Activity Instructions     Bathing Restrictions      Type of Restriction: Bathing    Bathing Restrictions: Other    Explain Bathing Restrictions: No soaking in bathtub for 4 weeks/ Showers are fine.    Driving Restrictions      Type of Restriction: Driving    Driving Restrictions: No Driving (Time Limited)    Length: Other    Indicate Length of Restriction: No driving for 1 week or while on narcotic pain medications. You must be able to quickly press on the brake before driving. Riding is car is fine.    Lifting Restrictions      Type of Restriction: Lifting    Lifting Restrictions: Other    Explain Lifing Restrictions: No lifting more than infant and baby carrier together for 6 weeks.    Pelvic Rest      Nothing in the vagina for 6 weeks to include tampons, douching, or sexual intercourse.    Sexual Activity Restrictions      Type of Restriction: Sex    Explain Sexual Activity Restrictions: No sexual intercourse, tampon use, or douching for at least 6 weeks        Regular   Discharge Medications:    Your medication list      START taking these medications      Instructions Last Dose Given Next Dose Due   acetaminophen 325 MG tablet  Commonly known as: Tylenol      Take 2 tablets by mouth Every 4 (Four) Hours As Needed for Mild Pain  or Moderate Pain .       ferrous sulfate 325 (65 FE) MG tablet      Take 1 tablet by mouth Daily With Breakfast.       ibuprofen 800 MG tablet  Commonly known as: ADVIL,MOTRIN      Take 1 tablet by mouth Every 8 (Eight) Hours As Needed for Mild Pain  or Moderate Pain .       norethindrone 0.35 MG tablet  Commonly known as: MICRONOR      Take 1 tablet by mouth Daily.       oxyCODONE 5 MG immediate  release tablet  Commonly known as: ROXICODONE      Take 1 tablet by mouth Every 4 (Four) Hours As Needed for Severe Pain  for up to 5 days.          CONTINUE taking these medications      Instructions Last Dose Given Next Dose Due   cetirizine 5 MG tablet  Commonly known as: zyrTEC      Take 1 tablet by mouth Daily.       docusate sodium 100 MG capsule  Commonly known as: Colace      Take 1 capsule by mouth 2 (Two) Times a Day.       prenatal vitamin 27-0.8 27-0.8 MG tablet tablet      Take 1 tablet by mouth Daily.             Where to Get Your Medications      These medications were sent to [facility][id number] - [address] AT [facility] [phone] [facility] [phone] [address] [address]    Phone: [phone] ·   acetaminophen 325 MG tablet  · docusate sodium 100 MG capsule  · ferrous sulfate 325 (65 FE) MG tablet  · ibuprofen 800 MG tablet  · norethindrone 0.35 MG tablet  · oxyCODONE 5 MG immediate release tablet        Discharge Disposition: Home   Follow-up: No future appointments.  1 week PP visit  6 week PP visit     <30 minutes spent with the patient.      This document has been electronically signed by Anabella Vegas DO on January 23, 2022 11:07 CST

## 2022-01-23 NOTE — DISCHARGE INSTR - ACTIVITY
"Notify Dr of... heavy bleeding, passing clots, foul odor to your discharge, temperature above 100.4, burning when urinating, gapping or drainage from incision or episiotomy, or for pain not relieved by taking pain medication, redness or streaking in breasts, pain or redness in legs.    Take all medications as prescribed.  Continue taking iron or prenatal vitamin while breastfeeding or until you run out.  Take rest periods several times during the day.  \"Baby Blues\" are normal and may be present around the 3rd-4th day after delivery. If they last longer than 2-3 days, please let your Dr know.  Pelvic rest for 6 weeks. No douching, tampons, or intercourse  No driving for 1 week, or while taking narcotic pain medication.   No lifting anything heavier than the baby in a car seat.   Wear a good supportive bra 24 hours/day to prevent engorgement   "

## 2022-01-23 NOTE — PLAN OF CARE
Problem: Adult Inpatient Plan of Care  Goal: Plan of Care Review  Outcome: Ongoing, Progressing  Flowsheets  Taken 1/22/2022 1800 by Hanane Weaver, RN  Progress: improving  Outcome Summary:   VSS   ambulates in room   voids   pain controlled with PO pain medication   no needs at this time  Taken 1/20/2022 1900 by Alma Hensley, RN  Plan of Care Reviewed With:   patient   significant other   Goal Outcome Evaluation:           Progress: improving  Outcome Summary: VSS; ambulates in room; voids; pain controlled with PO pain medication; no needs at this time

## 2022-01-23 NOTE — PLAN OF CARE
Problem: Adult Inpatient Plan of Care  Goal: Plan of Care Review  Outcome: Met  Flowsheets (Taken 1/23/2022 1128)  Progress: improving  Plan of Care Reviewed With: patient  Outcome Summary: VSS, ambulates in room, voids, pain controlled with PO pain meds.  LTV incision WNL, Fundus firm, -1, lochia light.  discharging home today.   Goal Outcome Evaluation:  Plan of Care Reviewed With: patient        Progress: improving  Outcome Summary: VSS, ambulates in room, voids, pain controlled with PO pain meds.  LTV incision WNL, Fundus firm, -1, lochia light.  discharging home today.

## 2022-01-24 NOTE — PAYOR COMM NOTE
"Angelique Gauthier  [facility]  Case Management Extender  [phone] phone  [phone] fax    Adrian Rod (26 y.o. Female)             Date of Birth Social Security Number Address Home Phone MRN    1995  [address] [facility] [phone] [id number]    [employer] Marital Status             Religious Single       Admission Date Admission Type Admitting Provider Attending Provider Department, Room/Bed    22 Elective Clayton Nicole DO  [facility] MOTHER BABY, M755/1    Discharge Date Discharge Disposition Discharge Destination          2022 Home or Self Care              Attending Provider: (none)   Allergies: No Known Allergies    Isolation: None   Infection: None   Code Status: Prior   Advance Care Planning Activity    Ht: 160 cm (63\")   Wt: 146 kg (322 lb)    Admission Cmt: None   Principal Problem: Gestational hypertension, third trimester [O13.3]                 Active Insurance as of 2022     Primary Coverage     Payor Plan Insurance Group Employer/Plan Group    ANTH MEDICAID [facility] MEDICAID [id number]     Payor Plan Address Payor Plan Phone Number Payor Plan Fax Number Effective Dates    [address] [address] [phone]  2021 - None Entered    [facility] [id number]       Subscriber Name Subscriber Birth Date Member ID       ADRIAN ROD 1995 [id number]                 Emergency Contacts      (Rel.) Home Phone Work Phone Mobile Phone    Marina Rod (Mother) -- -- [phone]    DENISE AVERY (Significant Other) [phone] -- [phone]               Discharge Summary      Anabella Vegas DO at 22 1102          [facility]  Discharge Summary  Patient Name: Adrian Rod  : 1995  MRN: [id number]  CSN: [id number]    Discharge Summary    Date of Admission: 2022   Date of Discharge: 2022    Principle " Discharge Dx: Active Hospital Problems    Diagnosis  POA   • **Gestational hypertension, third trimester [O13.3]  Yes   • Single delivery by  section [O82]  No   • Obesity affecting pregnancy in third trimester [O99.213]  Yes   • Supervision of high risk pregnancy in third trimester [O09.93]  Not Applicable      Procedures Performed: Procedure(s):   SECTION PRIMARY   Brief History: Patient is a 26 y.o. now  who presented to labor and delivery for IOL for GHTN.   Hospital Course: Patient presented for IOL but had failed IOL.  Over the course of 48 hours of induction, she received Cervidil x12 hours, pitocin x36 hours with amniotomy for nearly 24 hours; she only progressed to 3 cm.  At this time, decision made to proceed with  delivery. She had a PLTCS.  Her postoperative course was unremarkable.  On POD #3 she expressed the desire for discharge.  She had passed gas and had a BM and was urinating normally.  She was eating a regular diet without difficulty.  She was ambulating well.  Her incision was clean, dry and intact.  Patient was primarily bottlefeeding as milk had not yet come in but was still working on trying to breastfeed. She desired POPs for contraception which were sent to her pharmacy. Discharge instructions were given.  All questions were answered.    Vitals:    / 1804 / 2319 / 0632 / 0819   BP: 145/80 129/69 126/77 139/86   BP Location: Right arm Left arm Right arm Right arm   Patient Position: Sitting Sitting Sitting Sitting   Pulse: 108 98 88 73   Resp: 18 20 18 18   Temp: 97.5 °F (36.4 °C) 98.2 °F (36.8 °C) 97.7 °F (36.5 °C) 97.9 °F (36.6 °C)   TempSrc: Oral Oral Oral Oral   SpO2: 100% 100%  99%     Gen: well appearing, NAD  CV: RRR  Chest: no increased work of breathing  Abd: soft, appropriately tender, mildly distended, no rebound or guarding, fundus firm below umbilicus, incision c/d/i  Ext: 1+ BLE edema equal bilaterally, nontender       Condition:  Discharge Activity:  Discharge Diet: Stable  Activity Instructions     Bathing Restrictions      Type of Restriction: Bathing    Bathing Restrictions: Other    Explain Bathing Restrictions: No soaking in bathtub for 4 weeks/ Showers are fine.    Driving Restrictions      Type of Restriction: Driving    Driving Restrictions: No Driving (Time Limited)    Length: Other    Indicate Length of Restriction: No driving for 1 week or while on narcotic pain medications. You must be able to quickly press on the brake before driving. Riding is car is fine.    Lifting Restrictions      Type of Restriction: Lifting    Lifting Restrictions: Other    Explain Lifing Restrictions: No lifting more than infant and baby carrier together for 6 weeks.    Pelvic Rest      Nothing in the vagina for 6 weeks to include tampons, douching, or sexual intercourse.    Sexual Activity Restrictions      Type of Restriction: Sex    Explain Sexual Activity Restrictions: No sexual intercourse, tampon use, or douching for at least 6 weeks        Regular   Discharge Medications:    Your medication list      START taking these medications      Instructions Last Dose Given Next Dose Due   acetaminophen 325 MG tablet  Commonly known as: Tylenol      Take 2 tablets by mouth Every 4 (Four) Hours As Needed for Mild Pain  or Moderate Pain .       ferrous sulfate 325 (65 FE) MG tablet      Take 1 tablet by mouth Daily With Breakfast.       ibuprofen 800 MG tablet  Commonly known as: ADVIL,MOTRIN      Take 1 tablet by mouth Every 8 (Eight) Hours As Needed for Mild Pain  or Moderate Pain .       norethindrone 0.35 MG tablet  Commonly known as: MICRONOR      Take 1 tablet by mouth Daily.       oxyCODONE 5 MG immediate release tablet  Commonly known as: ROXICODONE      Take 1 tablet by mouth Every 4 (Four) Hours As Needed for Severe Pain  for up to 5 days.          CONTINUE taking these medications      Instructions Last Dose Given Next Dose Due    cetirizine 5 MG tablet  Commonly known as: zyrTEC      Take 1 tablet by mouth Daily.       docusate sodium 100 MG capsule  Commonly known as: Colace      Take 1 capsule by mouth 2 (Two) Times a Day.       prenatal vitamin 27-0.8 27-0.8 MG tablet tablet      Take 1 tablet by mouth Daily.             Where to Get Your Medications      These medications were sent to [facility] #[id number] - [place] [id number] [facility] [phone]  - [phone] [facility] [address]    Phone: [phone] ·   acetaminophen 325 MG tablet  · docusate sodium 100 MG capsule  · ferrous sulfate 325 (65 FE) MG tablet  · ibuprofen 800 MG tablet  · norethindrone 0.35 MG tablet  · oxyCODONE 5 MG immediate release tablet        Discharge Disposition: Home   Follow-up: No future appointments.  1 week PP visit  6 week PP visit     <30 minutes spent with the patient.      This document has been electronically signed by Anaeblla Vegas DO on January 23, 2022 11:07 CST    Electronically signed by Anabella Vegas DO at 01/23/22 1102       Discharge Order (From admission, onward)     Start     Ordered    01/23/22 1058  Discharge patient  Once        Expected Discharge Date: 01/23/22    Expected Discharge Time: Midday    Discharge Disposition: Home or Self Care    Physician of Record for Attribution - Please select from Treatment Team: PJ LOWE [[id number]]    Review needed by CMO to determine Physician of Record: No       Question Answer Comment   Physician of Record for Attribution - Please select from Treatment Team PJ LOWE    Review needed by CMO to determine Physician of Record No        01/23/22 1103

## 2022-01-27 ENCOUNTER — POSTPARTUM VISIT (OUTPATIENT)
Dept: OBSTETRICS AND GYNECOLOGY | Facility: CLINIC | Age: 27
End: 2022-01-27

## 2022-01-27 VITALS — DIASTOLIC BLOOD PRESSURE: 72 MMHG | WEIGHT: 293 LBS | SYSTOLIC BLOOD PRESSURE: 122 MMHG | BODY MASS INDEX: 54.91 KG/M2

## 2022-01-27 PROCEDURE — 99213 OFFICE O/P EST LOW 20 MIN: CPT | Performed by: OBSTETRICS & GYNECOLOGY

## 2022-01-27 NOTE — PROGRESS NOTES
Postpartum visit      Denise Rod is a 26 y.o.  s/p , Low Transverse on 2022 at 40w2d secondary to failed IOL with PROM who presents today for postpartum check.  The patient states she is doing fine but having a lot of issues with postpartum depression that started after leaving the hospital.  She states that 'she feels so crazy that she should be in a mental hospital.'  She reports fears about being older and not being here for her child.  She states that she is crying all of the time.  Has sleep disturbances.  Denies SI/HI.  Menstrual cycles have not resumed.  Breast/bottlefeeding.  Desires POPs for contraception.  She has not resumed sexual intercourse.  Denies bowel or bladder issues.    PHYSICAL EXAM:    /72 (BP Location: Left arm, Patient Position: Sitting, Cuff Size: Adult)   Wt (!) 141 kg (310 lb)   LMP 2021 (Exact Date)   BMI 54.91 kg/m²   Abdomen: +BS, benign, no masses, soft, non-tender.  Incision: Well-healed, clean, dry, intact.  Extremities: No deep calf tenderness.  Postpartum Depression Screening Questionnaire: 24    IMPRESSION/PLAN: Denise Rod is a 26 y.o.  s/p , Low Transverse on 2022.  Doing well.  - Recovered nicely from her delivery  - PPD: Will start Zoloft and recommend referral to [facility], patient agreeable.  Precautions given.    - Contraception: POPs  - RTC 5 weeks for final PP visit    This document has been electronically signed by Sho Louise MD on 2022 14:03 CST.

## 2022-03-08 ENCOUNTER — POSTPARTUM VISIT (OUTPATIENT)
Dept: OBSTETRICS AND GYNECOLOGY | Facility: CLINIC | Age: 27
End: 2022-03-08

## 2022-03-08 VITALS
HEIGHT: 63 IN | SYSTOLIC BLOOD PRESSURE: 118 MMHG | WEIGHT: 293 LBS | DIASTOLIC BLOOD PRESSURE: 72 MMHG | BODY MASS INDEX: 51.91 KG/M2

## 2022-03-08 DIAGNOSIS — Z30.011 ENCOUNTER FOR INITIAL PRESCRIPTION OF CONTRACEPTIVE PILLS: ICD-10-CM

## 2022-03-08 PROBLEM — O09.93 SUPERVISION OF HIGH RISK PREGNANCY IN THIRD TRIMESTER: Status: RESOLVED | Noted: 2021-09-21 | Resolved: 2022-03-08

## 2022-03-08 PROBLEM — O13.3 GESTATIONAL HYPERTENSION, THIRD TRIMESTER: Status: RESOLVED | Noted: 2022-01-18 | Resolved: 2022-03-08

## 2022-03-08 PROBLEM — O99.213 OBESITY AFFECTING PREGNANCY IN THIRD TRIMESTER: Status: RESOLVED | Noted: 2021-09-21 | Resolved: 2022-03-08

## 2022-03-08 PROCEDURE — 99214 OFFICE O/P EST MOD 30 MIN: CPT | Performed by: OBSTETRICS & GYNECOLOGY

## 2022-03-08 RX ORDER — NORETHINDRONE ACETATE AND ETHINYL ESTRADIOL 1MG-20(21)
1 KIT ORAL DAILY
Qty: 28 TABLET | Refills: 12 | Status: SHIPPED | OUTPATIENT
Start: 2022-03-08 | End: 2023-02-07 | Stop reason: HOSPADM

## 2022-03-08 NOTE — PROGRESS NOTES
"Postpartum visit      Denise Rod is a 26 y.o.  s/p , Low Transverse on 2022 at 40w2d secondary to failed IOL with PROM who presents today for postpartum check.  She was started on Zoloft at her last visit for PPD.  She states that she feels much better.  She did not pursue counseling.  Denies SI/HI.  Menstrual cycles have not resumed.  Breast/bottlefeeding.  Desires to switch to OCPs for contraception.  She has not resumed sexual intercourse.  Denies bowel or bladder issues.    PHYSICAL EXAM:    /72   Ht 160 cm (63\")   Wt (!) 138 kg (304 lb)   LMP 2021 (Exact Date)   BMI 53.85 kg/m²   Abdomen: +BS, benign, no masses, soft, non-tender.  Incision: Well-healed, clean, dry, intact.  Extremities: No deep calf tenderness.  Postpartum Depression Screening Questionnaire: 7    IMPRESSION/PLAN: Denise Rod is a 26 y.o.  s/p , Low Transverse on 2022.  Doing well.  - Recovered nicely from her delivery  - PPD: Continue Zoloft.  Precautions given.  - Restrictions lifted  - Contraception: OCPs, stop POPs  - RTC in 1 year  - Pap smear due     This document has been electronically signed by Sho Louise MD on 2022 11:44 CST.  "

## 2022-09-04 ENCOUNTER — HOSPITAL ENCOUNTER (OUTPATIENT)
Facility: HOSPITAL | Age: 27
Discharge: HOME | End: 2022-09-08
Attending: FAMILY MEDICINE | Admitting: SURGERY

## 2022-09-04 ENCOUNTER — APPOINTMENT (OUTPATIENT)
Dept: ULTRASOUND IMAGING | Facility: HOSPITAL | Age: 27
End: 2022-09-04

## 2022-09-04 ENCOUNTER — APPOINTMENT (OUTPATIENT)
Dept: GENERAL RADIOLOGY | Facility: HOSPITAL | Age: 27
End: 2022-09-04

## 2022-09-04 DIAGNOSIS — K80.71 CALCULUS OF GALLBLADDER AND BILE DUCT WITH OBSTRUCTION WITHOUT CHOLECYSTITIS: Primary | ICD-10-CM

## 2022-09-04 LAB
ALBUMIN SERPL-MCNC: 3.9 G/DL (ref 3.5–5.2)
ALBUMIN/GLOB SERPL: 1 G/DL
ALP SERPL-CCNC: 234 U/L (ref 39–117)
ALT SERPL W P-5'-P-CCNC: 45 U/L (ref 1–33)
AMORPH URATE CRY URNS QL MICRO: ABNORMAL /HPF
ANION GAP SERPL CALCULATED.3IONS-SCNC: 8 MMOL/L (ref 5–15)
AST SERPL-CCNC: 69 U/L (ref 1–32)
BACTERIA UR QL AUTO: ABNORMAL /HPF
BASOPHILS # BLD AUTO: 0.02 10*3/MM3 (ref 0–0.2)
BASOPHILS NFR BLD AUTO: 0.2 % (ref 0–1.5)
BILIRUB SERPL-MCNC: 2.6 MG/DL (ref 0–1.2)
BILIRUB UR QL STRIP: ABNORMAL
BUN SERPL-MCNC: 13 MG/DL (ref 6–20)
BUN/CREAT SERPL: 16.9 (ref 7–25)
CALCIUM SPEC-SCNC: 9 MG/DL (ref 8.6–10.5)
CHLORIDE SERPL-SCNC: 106 MMOL/L (ref 98–107)
CLARITY UR: ABNORMAL
CO2 SERPL-SCNC: 27 MMOL/L (ref 22–29)
COLOR UR: ABNORMAL
CREAT SERPL-MCNC: 0.77 MG/DL (ref 0.57–1)
DEPRECATED RDW RBC AUTO: 39.6 FL (ref 37–54)
EGFRCR SERPLBLD CKD-EPI 2021: 109.9 ML/MIN/1.73
EOSINOPHIL # BLD AUTO: 0.03 10*3/MM3 (ref 0–0.4)
EOSINOPHIL NFR BLD AUTO: 0.4 % (ref 0.3–6.2)
ERYTHROCYTE [DISTWIDTH] IN BLOOD BY AUTOMATED COUNT: 12.5 % (ref 12.3–15.4)
GLOBULIN UR ELPH-MCNC: 4.1 GM/DL
GLUCOSE SERPL-MCNC: 111 MG/DL (ref 65–99)
GLUCOSE UR STRIP-MCNC: NEGATIVE MG/DL
HCG SERPL QL: NEGATIVE
HCT VFR BLD AUTO: 35.8 % (ref 34–46.6)
HGB BLD-MCNC: 12.1 G/DL (ref 12–15.9)
HGB UR QL STRIP.AUTO: NEGATIVE
HOLD SPECIMEN: NORMAL
HOLD SPECIMEN: NORMAL
HYALINE CASTS UR QL AUTO: ABNORMAL /LPF
IMM GRANULOCYTES # BLD AUTO: 0.03 10*3/MM3 (ref 0–0.05)
IMM GRANULOCYTES NFR BLD AUTO: 0.4 % (ref 0–0.5)
KETONES UR QL STRIP: ABNORMAL
LEUKOCYTE ESTERASE UR QL STRIP.AUTO: ABNORMAL
LIPASE SERPL-CCNC: 17 U/L (ref 13–60)
LYMPHOCYTES # BLD AUTO: 1.75 10*3/MM3 (ref 0.7–3.1)
LYMPHOCYTES NFR BLD AUTO: 20.7 % (ref 19.6–45.3)
MCH RBC QN AUTO: 29.3 PG (ref 26.6–33)
MCHC RBC AUTO-ENTMCNC: 33.8 G/DL (ref 31.5–35.7)
MCV RBC AUTO: 86.7 FL (ref 79–97)
MONOCYTES # BLD AUTO: 0.43 10*3/MM3 (ref 0.1–0.9)
MONOCYTES NFR BLD AUTO: 5.1 % (ref 5–12)
MUCOUS THREADS URNS QL MICRO: ABNORMAL /HPF
NEUTROPHILS NFR BLD AUTO: 6.18 10*3/MM3 (ref 1.7–7)
NEUTROPHILS NFR BLD AUTO: 73.2 % (ref 42.7–76)
NITRITE UR QL STRIP: POSITIVE
NRBC BLD AUTO-RTO: 0 /100 WBC (ref 0–0.2)
PH UR STRIP.AUTO: 5.5 [PH] (ref 5–9)
PLATELET # BLD AUTO: 270 10*3/MM3 (ref 140–450)
PMV BLD AUTO: 9.5 FL (ref 6–12)
POTASSIUM SERPL-SCNC: 3.5 MMOL/L (ref 3.5–5.2)
PROT SERPL-MCNC: 8 G/DL (ref 6–8.5)
PROT UR QL STRIP: ABNORMAL
RBC # BLD AUTO: 4.13 10*6/MM3 (ref 3.77–5.28)
RBC # UR STRIP: ABNORMAL /HPF
REF LAB TEST METHOD: ABNORMAL
SODIUM SERPL-SCNC: 141 MMOL/L (ref 136–145)
SP GR UR STRIP: 1.03 (ref 1–1.03)
SQUAMOUS #/AREA URNS HPF: ABNORMAL /HPF
UROBILINOGEN UR QL STRIP: ABNORMAL
WBC # UR STRIP: ABNORMAL /HPF
WBC NRBC COR # BLD: 8.44 10*3/MM3 (ref 3.4–10.8)
WHOLE BLOOD HOLD COAG: NORMAL
WHOLE BLOOD HOLD SPECIMEN: NORMAL

## 2022-09-04 PROCEDURE — 81001 URINALYSIS AUTO W/SCOPE: CPT | Performed by: FAMILY MEDICINE

## 2022-09-04 PROCEDURE — 85025 COMPLETE CBC W/AUTO DIFF WBC: CPT | Performed by: FAMILY MEDICINE

## 2022-09-04 PROCEDURE — 80053 COMPREHEN METABOLIC PANEL: CPT | Performed by: FAMILY MEDICINE

## 2022-09-04 PROCEDURE — 99284 EMERGENCY DEPT VISIT MOD MDM: CPT

## 2022-09-04 PROCEDURE — 74022 RADEX COMPL AQT ABD SERIES: CPT

## 2022-09-04 PROCEDURE — 84703 CHORIONIC GONADOTROPIN ASSAY: CPT | Performed by: FAMILY MEDICINE

## 2022-09-04 PROCEDURE — 76705 ECHO EXAM OF ABDOMEN: CPT

## 2022-09-04 PROCEDURE — 83690 ASSAY OF LIPASE: CPT | Performed by: FAMILY MEDICINE

## 2022-09-04 RX ORDER — SODIUM CHLORIDE 0.9 % (FLUSH) 0.9 %
10 SYRINGE (ML) INJECTION AS NEEDED
Status: DISCONTINUED | OUTPATIENT
Start: 2022-09-04 | End: 2022-09-08 | Stop reason: HOSPADM

## 2022-09-04 RX ORDER — ALUMINA, MAGNESIA, AND SIMETHICONE 2400; 2400; 240 MG/30ML; MG/30ML; MG/30ML
15 SUSPENSION ORAL ONCE
Status: COMPLETED | OUTPATIENT
Start: 2022-09-04 | End: 2022-09-04

## 2022-09-04 RX ORDER — LIDOCAINE HYDROCHLORIDE 20 MG/ML
15 SOLUTION OROPHARYNGEAL ONCE
Status: COMPLETED | OUTPATIENT
Start: 2022-09-04 | End: 2022-09-04

## 2022-09-04 RX ADMIN — MAGNESIUM HYDROXIDE,ALUMINUM HYDROXICE,SIMETHICONE 15 ML: 240; 2400; 2400 SUSPENSION ORAL at 22:00

## 2022-09-04 RX ADMIN — LIDOCAINE HYDROCHLORIDE 15 ML: 20 SOLUTION ORAL; TOPICAL at 22:00

## 2022-09-05 ENCOUNTER — APPOINTMENT (OUTPATIENT)
Dept: GENERAL RADIOLOGY | Facility: HOSPITAL | Age: 27
End: 2022-09-05

## 2022-09-05 ENCOUNTER — ANESTHESIA EVENT (OUTPATIENT)
Dept: PERIOP | Facility: HOSPITAL | Age: 27
End: 2022-09-05

## 2022-09-05 ENCOUNTER — ANESTHESIA (OUTPATIENT)
Dept: PERIOP | Facility: HOSPITAL | Age: 27
End: 2022-09-05

## 2022-09-05 PROBLEM — K80.71 CALCULUS OF GALLBLADDER AND BILE DUCT WITH OBSTRUCTION WITHOUT CHOLECYSTITIS: Status: ACTIVE | Noted: 2022-09-05

## 2022-09-05 LAB
ALBUMIN SERPL-MCNC: 3.3 G/DL (ref 3.5–5.2)
ALBUMIN/GLOB SERPL: 0.9 G/DL
ALP SERPL-CCNC: 227 U/L (ref 39–117)
ALT SERPL W P-5'-P-CCNC: 46 U/L (ref 1–33)
ANION GAP SERPL CALCULATED.3IONS-SCNC: 8 MMOL/L (ref 5–15)
AST SERPL-CCNC: 62 U/L (ref 1–32)
BASOPHILS # BLD AUTO: 0.03 10*3/MM3 (ref 0–0.2)
BASOPHILS NFR BLD AUTO: 0.4 % (ref 0–1.5)
BILIRUB SERPL-MCNC: 2.2 MG/DL (ref 0–1.2)
BUN SERPL-MCNC: 12 MG/DL (ref 6–20)
BUN/CREAT SERPL: 17.1 (ref 7–25)
CALCIUM SPEC-SCNC: 8.5 MG/DL (ref 8.6–10.5)
CHLORIDE SERPL-SCNC: 106 MMOL/L (ref 98–107)
CO2 SERPL-SCNC: 26 MMOL/L (ref 22–29)
CREAT SERPL-MCNC: 0.7 MG/DL (ref 0.57–1)
DEPRECATED RDW RBC AUTO: 41 FL (ref 37–54)
EGFRCR SERPLBLD CKD-EPI 2021: 123.3 ML/MIN/1.73
EOSINOPHIL # BLD AUTO: 0.05 10*3/MM3 (ref 0–0.4)
EOSINOPHIL NFR BLD AUTO: 0.7 % (ref 0.3–6.2)
ERYTHROCYTE [DISTWIDTH] IN BLOOD BY AUTOMATED COUNT: 12.7 % (ref 12.3–15.4)
GLOBULIN UR ELPH-MCNC: 3.8 GM/DL
GLUCOSE SERPL-MCNC: 95 MG/DL (ref 65–99)
HCT VFR BLD AUTO: 34.1 % (ref 34–46.6)
HGB BLD-MCNC: 11.1 G/DL (ref 12–15.9)
IMM GRANULOCYTES # BLD AUTO: 0.02 10*3/MM3 (ref 0–0.05)
IMM GRANULOCYTES NFR BLD AUTO: 0.3 % (ref 0–0.5)
LYMPHOCYTES # BLD AUTO: 2.96 10*3/MM3 (ref 0.7–3.1)
LYMPHOCYTES NFR BLD AUTO: 42.5 % (ref 19.6–45.3)
MCH RBC QN AUTO: 29.1 PG (ref 26.6–33)
MCHC RBC AUTO-ENTMCNC: 32.6 G/DL (ref 31.5–35.7)
MCV RBC AUTO: 89.3 FL (ref 79–97)
MONOCYTES # BLD AUTO: 0.49 10*3/MM3 (ref 0.1–0.9)
MONOCYTES NFR BLD AUTO: 7 % (ref 5–12)
NEUTROPHILS NFR BLD AUTO: 3.41 10*3/MM3 (ref 1.7–7)
NEUTROPHILS NFR BLD AUTO: 49.1 % (ref 42.7–76)
NRBC BLD AUTO-RTO: 0 /100 WBC (ref 0–0.2)
PLATELET # BLD AUTO: 237 10*3/MM3 (ref 140–450)
PMV BLD AUTO: 9.4 FL (ref 6–12)
POTASSIUM SERPL-SCNC: 3.6 MMOL/L (ref 3.5–5.2)
PROT SERPL-MCNC: 7.1 G/DL (ref 6–8.5)
RBC # BLD AUTO: 3.82 10*6/MM3 (ref 3.77–5.28)
SODIUM SERPL-SCNC: 140 MMOL/L (ref 136–145)
WBC NRBC COR # BLD: 6.96 10*3/MM3 (ref 3.4–10.8)

## 2022-09-05 PROCEDURE — 25010000002 HYDROMORPHONE 1 MG/ML SOLUTION: Performed by: NURSE ANESTHETIST, CERTIFIED REGISTERED

## 2022-09-05 PROCEDURE — C1894 INTRO/SHEATH, NON-LASER: HCPCS | Performed by: SURGERY

## 2022-09-05 PROCEDURE — 47563 LAPARO CHOLECYSTECTOMY/GRAPH: CPT | Performed by: SPECIALIST/TECHNOLOGIST, OTHER

## 2022-09-05 PROCEDURE — 76000 FLUOROSCOPY <1 HR PHYS/QHP: CPT

## 2022-09-05 PROCEDURE — 25010000002 SUCCINYLCHOLINE PER 20 MG: Performed by: NURSE ANESTHETIST, CERTIFIED REGISTERED

## 2022-09-05 PROCEDURE — 25010000002 CEFOXITIN PER 1 G: Performed by: NURSE ANESTHETIST, CERTIFIED REGISTERED

## 2022-09-05 PROCEDURE — 25010000002 NEOSTIGMINE 10 MG/10ML SOLUTION: Performed by: NURSE ANESTHETIST, CERTIFIED REGISTERED

## 2022-09-05 PROCEDURE — 25010000002 PROPOFOL 10 MG/ML EMULSION: Performed by: NURSE ANESTHETIST, CERTIFIED REGISTERED

## 2022-09-05 PROCEDURE — 80053 COMPREHEN METABOLIC PANEL: CPT | Performed by: SURGERY

## 2022-09-05 PROCEDURE — 47563 LAPARO CHOLECYSTECTOMY/GRAPH: CPT | Performed by: SURGERY

## 2022-09-05 PROCEDURE — 85025 COMPLETE CBC W/AUTO DIFF WBC: CPT | Performed by: SURGERY

## 2022-09-05 PROCEDURE — 25010000002 ONDANSETRON PER 1 MG: Performed by: NURSE ANESTHETIST, CERTIFIED REGISTERED

## 2022-09-05 PROCEDURE — 25010000002 IOPAMIDOL 61 % SOLUTION: Performed by: SURGERY

## 2022-09-05 PROCEDURE — 96360 HYDRATION IV INFUSION INIT: CPT

## 2022-09-05 PROCEDURE — G0378 HOSPITAL OBSERVATION PER HR: HCPCS

## 2022-09-05 PROCEDURE — 25010000002 DEXAMETHASONE PER 1 MG: Performed by: NURSE ANESTHETIST, CERTIFIED REGISTERED

## 2022-09-05 PROCEDURE — 25010000002 FENTANYL CITRATE (PF) 50 MCG/ML SOLUTION: Performed by: NURSE ANESTHETIST, CERTIFIED REGISTERED

## 2022-09-05 PROCEDURE — 25010000002 MIDAZOLAM PER 1 MG: Performed by: NURSE ANESTHETIST, CERTIFIED REGISTERED

## 2022-09-05 PROCEDURE — 99219 PR INITIAL OBSERVATION CARE/DAY 50 MINUTES: CPT | Performed by: SURGERY

## 2022-09-05 PROCEDURE — 96361 HYDRATE IV INFUSION ADD-ON: CPT

## 2022-09-05 DEVICE — LIGAMAX 5 MM ENDOSCOPIC MULTIPLE CLIP APPLIER
Type: IMPLANTABLE DEVICE | Site: ABDOMEN | Status: FUNCTIONAL
Brand: LIGAMAX

## 2022-09-05 RX ORDER — MIDAZOLAM HYDROCHLORIDE 1 MG/ML
INJECTION INTRAMUSCULAR; INTRAVENOUS AS NEEDED
Status: DISCONTINUED | OUTPATIENT
Start: 2022-09-05 | End: 2022-09-05 | Stop reason: SURG

## 2022-09-05 RX ORDER — SUCCINYLCHOLINE CHLORIDE 20 MG/ML
INJECTION INTRAMUSCULAR; INTRAVENOUS AS NEEDED
Status: DISCONTINUED | OUTPATIENT
Start: 2022-09-05 | End: 2022-09-05 | Stop reason: SURG

## 2022-09-05 RX ORDER — ROCURONIUM BROMIDE 10 MG/ML
INJECTION, SOLUTION INTRAVENOUS AS NEEDED
Status: DISCONTINUED | OUTPATIENT
Start: 2022-09-05 | End: 2022-09-05 | Stop reason: SURG

## 2022-09-05 RX ORDER — CEFOXITIN 2 G/1
INJECTION, POWDER, FOR SOLUTION INTRAVENOUS AS NEEDED
Status: DISCONTINUED | OUTPATIENT
Start: 2022-09-05 | End: 2022-09-05 | Stop reason: SURG

## 2022-09-05 RX ORDER — ACETAMINOPHEN 650 MG/1
650 SUPPOSITORY RECTAL ONCE AS NEEDED
Status: DISCONTINUED | OUTPATIENT
Start: 2022-09-05 | End: 2022-09-05 | Stop reason: HOSPADM

## 2022-09-05 RX ORDER — ALBUTEROL SULFATE 2.5 MG/3ML
2.5 SOLUTION RESPIRATORY (INHALATION) ONCE AS NEEDED
Status: DISCONTINUED | OUTPATIENT
Start: 2022-09-05 | End: 2022-09-05 | Stop reason: HOSPADM

## 2022-09-05 RX ORDER — DIPHENHYDRAMINE HYDROCHLORIDE 50 MG/ML
12.5 INJECTION INTRAMUSCULAR; INTRAVENOUS
Status: DISCONTINUED | OUTPATIENT
Start: 2022-09-05 | End: 2022-09-05 | Stop reason: HOSPADM

## 2022-09-05 RX ORDER — DEXAMETHASONE SODIUM PHOSPHATE 4 MG/ML
INJECTION, SOLUTION INTRA-ARTICULAR; INTRALESIONAL; INTRAMUSCULAR; INTRAVENOUS; SOFT TISSUE AS NEEDED
Status: DISCONTINUED | OUTPATIENT
Start: 2022-09-05 | End: 2022-09-05 | Stop reason: SURG

## 2022-09-05 RX ORDER — ONDANSETRON 2 MG/ML
4 INJECTION INTRAMUSCULAR; INTRAVENOUS ONCE AS NEEDED
Status: DISCONTINUED | OUTPATIENT
Start: 2022-09-05 | End: 2022-09-05 | Stop reason: HOSPADM

## 2022-09-05 RX ORDER — ACETAMINOPHEN 325 MG/1
650 TABLET ORAL ONCE AS NEEDED
Status: DISCONTINUED | OUTPATIENT
Start: 2022-09-05 | End: 2022-09-05 | Stop reason: HOSPADM

## 2022-09-05 RX ORDER — PROPOFOL 10 MG/ML
VIAL (ML) INTRAVENOUS AS NEEDED
Status: DISCONTINUED | OUTPATIENT
Start: 2022-09-05 | End: 2022-09-05 | Stop reason: SURG

## 2022-09-05 RX ORDER — ONDANSETRON 4 MG/1
4 TABLET, ORALLY DISINTEGRATING ORAL EVERY 4 HOURS PRN
Status: DISCONTINUED | OUTPATIENT
Start: 2022-09-05 | End: 2022-09-08 | Stop reason: HOSPADM

## 2022-09-05 RX ORDER — HYDROCODONE BITARTRATE AND ACETAMINOPHEN 5; 325 MG/1; MG/1
1 TABLET ORAL EVERY 4 HOURS PRN
Status: DISCONTINUED | OUTPATIENT
Start: 2022-09-05 | End: 2022-09-08 | Stop reason: HOSPADM

## 2022-09-05 RX ORDER — 0.9 % SODIUM CHLORIDE 0.9 %
VIAL (ML) INJECTION AS NEEDED
Status: DISCONTINUED | OUTPATIENT
Start: 2022-09-05 | End: 2022-09-05 | Stop reason: HOSPADM

## 2022-09-05 RX ORDER — NEOSTIGMINE METHYLSULFATE 1 MG/ML
INJECTION, SOLUTION INTRAVENOUS AS NEEDED
Status: DISCONTINUED | OUTPATIENT
Start: 2022-09-05 | End: 2022-09-05 | Stop reason: SURG

## 2022-09-05 RX ORDER — SODIUM CHLORIDE, SODIUM GLUCONATE, SODIUM ACETATE, POTASSIUM CHLORIDE AND MAGNESIUM CHLORIDE 526; 502; 368; 37; 30 MG/100ML; MG/100ML; MG/100ML; MG/100ML; MG/100ML
INJECTION, SOLUTION INTRAVENOUS CONTINUOUS PRN
Status: DISCONTINUED | OUTPATIENT
Start: 2022-09-05 | End: 2022-09-05 | Stop reason: SURG

## 2022-09-05 RX ORDER — BUPIVACAINE HYDROCHLORIDE 2.5 MG/ML
INJECTION, SOLUTION EPIDURAL; INFILTRATION; INTRACAUDAL AS NEEDED
Status: DISCONTINUED | OUTPATIENT
Start: 2022-09-05 | End: 2022-09-05 | Stop reason: HOSPADM

## 2022-09-05 RX ORDER — FENTANYL CITRATE 50 UG/ML
INJECTION, SOLUTION INTRAMUSCULAR; INTRAVENOUS AS NEEDED
Status: DISCONTINUED | OUTPATIENT
Start: 2022-09-05 | End: 2022-09-05 | Stop reason: SURG

## 2022-09-05 RX ORDER — SODIUM CHLORIDE 9 MG/ML
125 INJECTION, SOLUTION INTRAVENOUS CONTINUOUS
Status: DISCONTINUED | OUTPATIENT
Start: 2022-09-05 | End: 2022-09-08 | Stop reason: HOSPADM

## 2022-09-05 RX ORDER — ONDANSETRON 2 MG/ML
INJECTION INTRAMUSCULAR; INTRAVENOUS AS NEEDED
Status: DISCONTINUED | OUTPATIENT
Start: 2022-09-05 | End: 2022-09-05 | Stop reason: SURG

## 2022-09-05 RX ORDER — NALOXONE HCL 0.4 MG/ML
0.4 VIAL (ML) INJECTION AS NEEDED
Status: DISCONTINUED | OUTPATIENT
Start: 2022-09-05 | End: 2022-09-05 | Stop reason: HOSPADM

## 2022-09-05 RX ADMIN — CEFOXITIN 2 G: 2 INJECTION, POWDER, FOR SOLUTION INTRAVENOUS at 11:52

## 2022-09-05 RX ADMIN — SODIUM CHLORIDE 125 ML/HR: 9 INJECTION, SOLUTION INTRAVENOUS at 00:17

## 2022-09-05 RX ADMIN — DEXAMETHASONE SODIUM PHOSPHATE 4 MG: 4 INJECTION, SOLUTION INTRAMUSCULAR; INTRAVENOUS at 12:02

## 2022-09-05 RX ADMIN — MIDAZOLAM HYDROCHLORIDE 2 MG: 1 INJECTION, SOLUTION INTRAMUSCULAR; INTRAVENOUS at 11:36

## 2022-09-05 RX ADMIN — HYDROCODONE BITARTRATE AND ACETAMINOPHEN 1 TABLET: 5; 325 TABLET ORAL at 19:57

## 2022-09-05 RX ADMIN — SUCCINYLCHOLINE CHLORIDE 120 MG: 20 INJECTION, SOLUTION INTRAMUSCULAR; INTRAVENOUS at 11:46

## 2022-09-05 RX ADMIN — HYDROMORPHONE HYDROCHLORIDE 0.5 MG: 1 INJECTION, SOLUTION INTRAMUSCULAR; INTRAVENOUS; SUBCUTANEOUS at 13:29

## 2022-09-05 RX ADMIN — HYDROMORPHONE HYDROCHLORIDE 0.5 MG: 1 INJECTION, SOLUTION INTRAMUSCULAR; INTRAVENOUS; SUBCUTANEOUS at 13:19

## 2022-09-05 RX ADMIN — FENTANYL CITRATE 100 MCG: 50 INJECTION INTRAMUSCULAR; INTRAVENOUS at 11:41

## 2022-09-05 RX ADMIN — PROPOFOL 200 MG: 10 INJECTION, EMULSION INTRAVENOUS at 11:45

## 2022-09-05 RX ADMIN — HYDROCODONE BITARTRATE AND ACETAMINOPHEN 1 TABLET: 5; 325 TABLET ORAL at 14:23

## 2022-09-05 RX ADMIN — GLYCOPYRROLATE 0.4 MG: 0.2 INJECTION, SOLUTION INTRAMUSCULAR; INTRAVITREAL at 13:00

## 2022-09-05 RX ADMIN — SODIUM CHLORIDE, SODIUM GLUCONATE, SODIUM ACETATE, POTASSIUM CHLORIDE AND MAGNESIUM CHLORIDE: 526; 502; 368; 37; 30 INJECTION, SOLUTION INTRAVENOUS at 11:53

## 2022-09-05 RX ADMIN — Medication 10 ML: at 19:59

## 2022-09-05 RX ADMIN — ROCURONIUM BROMIDE 10 MG: 10 INJECTION INTRAVENOUS at 11:45

## 2022-09-05 RX ADMIN — SODIUM CHLORIDE 125 ML/HR: 9 INJECTION, SOLUTION INTRAVENOUS at 17:37

## 2022-09-05 RX ADMIN — HYDROCODONE BITARTRATE AND ACETAMINOPHEN 1 TABLET: 5; 325 TABLET ORAL at 01:43

## 2022-09-05 RX ADMIN — ROCURONIUM BROMIDE 40 MG: 10 INJECTION INTRAVENOUS at 11:54

## 2022-09-05 RX ADMIN — ONDANSETRON 4 MG: 2 INJECTION INTRAMUSCULAR; INTRAVENOUS at 13:00

## 2022-09-05 RX ADMIN — NEOSTIGMINE METHYLSULFATE 3 MG: 0.5 INJECTION INTRAVENOUS at 13:00

## 2022-09-05 NOTE — ED NOTES
Pt c/o abdominal pain and back pain. Pt states that she hasn't had a boweel movement since Friday. Pt states that she was seen at [facility] today.

## 2022-09-05 NOTE — H&P
Patient Care Team:  Provider, No Known as PCP - General      Subjective .   Chief complaint: Abdominal pain and nausea    History of present illness: 27-year-old female who began having upper abdominal pain and nausea on Saturday.  It had improved by  morning, but after lunch worsened again.  She was seen at an urgent care where she was told to take milk of magnesia for constipation.  This did not improve her symptoms.  Her pain was radiating into her back and caused her to have 1 episode of emesis.  She had never had pain like this before.    Review of Systems   Constitutional: Negative.    HENT: Negative.    Eyes: Negative.    Respiratory: Negative.    Cardiovascular: Negative.    Gastrointestinal: Positive for abdominal pain and nausea.   Genitourinary: Negative.    Musculoskeletal: Negative.    Skin: Negative.    Neurological: Negative.    Psychiatric/Behavioral: Negative.          History  Past Medical History:   Diagnosis Date   • Anxiety    ,   Past Surgical History:   Procedure Laterality Date   •  SECTION N/A 2022    Procedure:  SECTION PRIMARY;  Surgeon: Sho Louise MD;  Location: [facility] LABOR DELIVERY;  Service: Obstetrics;  Laterality: N/A;   • FOOT FRACTURE SURGERY     • KNEE CARTILAGE SURGERY     ,   Family History   Problem Relation Age of Onset   • Hypertension Mother    • Hypertension Maternal Grandmother    • Diabetes Maternal Grandmother    • Depression Maternal Grandmother    ,   Social History     Tobacco Use   • Smoking status: Never Smoker   • Smokeless tobacco: Never Used   Substance Use Topics   • Alcohol use: Not Currently     Alcohol/week: 0.0 standard drinks   • Drug use: Never   , Home Medications:  Prior to Admission medications    Medication Sig Start Date End Date Taking? Authorizing Provider   norethindrone-ethinyl estradiol FE (Junel FE 1/20) 1-20 MG-MCG per tablet Take 1 tablet by mouth Daily. 3/8/22 3/8/23 Yes Dani  Sho Blanchard MD   sertraline (ZOLOFT) 50 MG tablet TAKE 1 TABLET BY MOUTH DAILY 7/19/22 7/19/23 Yes Dani, Sho Blanchard MD   acetaminophen (Tylenol) 325 MG tablet Take 2 tablets by mouth Every 4 (Four) Hours As Needed for Mild Pain  or Moderate Pain . 1/23/22 1/23/23  Anabella Vegas DO   cetirizine (zyrTEC) 5 MG tablet Take 1 tablet by mouth Daily. 11/16/21 11/16/22  Clayton Nicole DO   ibuprofen (ADVIL,MOTRIN) 800 MG tablet Take 1 tablet by mouth Every 8 (Eight) Hours As Needed for Mild Pain  or Moderate Pain . 1/23/22   Anabella Vegas DO   , Scheduled Meds:   , Continuous Infusions:  sodium chloride, 125 mL/hr, Last Rate: 125 mL/hr (09/05/22 0630)    , PRN Meds:  •  HYDROcodone-acetaminophen  •  ondansetron ODT  •  sodium chloride and Allergies:  No Known Allergies    Objective     Vital Signs   Temp:  [97 °F (36.1 °C)-98 °F (36.7 °C)] 97.6 °F (36.4 °C)  Heart Rate:  [65-87] 65  Resp:  [16-18] 18  BP: (131-153)/(80-97) 141/88    Physical Exam:  Physical Exam  Constitutional:       Appearance: Normal appearance.   HENT:      Head: Normocephalic and atraumatic.   Eyes:      Extraocular Movements: Extraocular movements intact.      Pupils: Pupils are equal, round, and reactive to light.   Cardiovascular:      Rate and Rhythm: Normal rate and regular rhythm.   Pulmonary:      Effort: Pulmonary effort is normal. No respiratory distress.   Abdominal:      Comments: Soft, obese, tender to palpation in the right upper quadrant with a positive Hayes sign   Skin:     General: Skin is warm.      Coloration: Skin is not jaundiced.   Neurological:      General: No focal deficit present.      Mental Status: She is alert and oriented to person, place, and time.   Psychiatric:         Mood and Affect: Mood normal.         Behavior: Behavior normal.           Results from last 7 days   Lab Units 09/05/22  0735 09/04/22  2121   SODIUM mmol/L 140 141   POTASSIUM mmol/L 3.6 3.5   CHLORIDE mmol/L 106 106    CO2 mmol/L 26.0 27.0   BUN mg/dL 12 13   CREATININE mg/dL 0.70 0.77   GLUCOSE mg/dL 95 111*   CALCIUM mg/dL 8.5* 9.0     Results from last 7 days   Lab Units 09/05/22  0735 09/04/22  2121   WBC 10*3/mm3 6.96 8.44   HEMOGLOBIN g/dL 11.1* 12.1   HEMATOCRIT % 34.1 35.8   PLATELETS 10*3/mm3 237 270       Results Review:   I reviewed the patient's new clinical results.  I reviewed the patient's new imaging results and agree with the interpretation.      Assessment & Plan       Calculus of gallbladder and bile duct with obstruction without cholecystitis    Patient likely has acute cholecystitis and passed a gallstone.  I discussed the risks of laparoscopic cholecystectomy including bleeding, infection, bile leak, and common bile duct injury.  The patient expressed understanding and wishes to proceed with surgery.  I will perform an intraoperative cholangiogram at the time of her cholecystectomy to evaluate her duct.    I discussed the patient's findings and my recommendations with patient      This document has been electronically signed by Jeffry Witt MD on September 5, 2022 08:57 CDT     Jeffry Witt MD  09/05/22  08:57 CDT

## 2022-09-05 NOTE — ANESTHESIA PROCEDURE NOTES
Airway  Urgency: elective    Date/Time: 9/5/2022 11:47 AM  Airway not difficult    General Information and Staff    Patient location during procedure: OR  CRNA/CAA: Edwige Fishman, CRNA    Indications and Patient Condition  Indications for airway management: airway protection    Preoxygenated: yes  Mask difficulty assessment: 0 - not attempted    Final Airway Details  Final airway type: endotracheal airway      Successful airway: ETT    Successful intubation technique: video laryngoscopy and RSI  Facilitating devices/methods: intubating stylet and cricoid pressure  Endotracheal tube insertion site: oral  Blade: Castillo  Blade size: 3  ETT size (mm): 7.5  Cormack-Lehane Classification: grade I - full view of glottis  Placement verified by: chest auscultation and capnometry   Measured from: lips  ETT/EBT  to lips (cm): 19  Number of attempts at approach: 1  Assessment: lips, teeth, and gum same as pre-op and atraumatic intubation

## 2022-09-05 NOTE — ED PROVIDER NOTES
Subjective   Patient presents emergency department with epigastric pain x2 days.  She states that when she gets this type of pain at home it usually resolves with Pepto-Bismol.  She had some nausea vomiting yesterday morning.  She has taken milk of magnesia today with no relief.        Abdominal Pain  Pain location:  Epigastric  Pain quality: stabbing    Pain radiates to:  Back  Pain severity:  Moderate  Duration:  2 days  Timing:  Intermittent  Chronicity:  Recurrent  Worsened by:  Nothing  Ineffective treatments:  None tried  Associated symptoms: no chest pain, no chills, no cough, no diarrhea, no dysuria, no fatigue, no fever, no nausea, no shortness of breath, no sore throat and no vomiting    Back Pain  Associated symptoms: abdominal pain    Associated symptoms: no chest pain, no dysuria, no fever, no headaches and no weakness        Review of Systems   Constitutional: Negative for appetite change, chills, diaphoresis, fatigue and fever.   HENT: Negative for congestion, ear discharge, ear pain, nosebleeds, rhinorrhea, sinus pressure, sore throat and trouble swallowing.    Eyes: Negative for discharge and redness.   Respiratory: Negative for apnea, cough, chest tightness, shortness of breath and wheezing.    Cardiovascular: Negative for chest pain.   Gastrointestinal: Positive for abdominal pain. Negative for diarrhea, nausea and vomiting.   Endocrine: Negative for polyuria.   Genitourinary: Negative for dysuria, frequency and urgency.   Musculoskeletal: Positive for back pain. Negative for myalgias and neck pain.   Skin: Negative for color change and rash.   Allergic/Immunologic: Negative for immunocompromised state.   Neurological: Negative for dizziness, seizures, syncope, weakness, light-headedness and headaches.   Hematological: Negative for adenopathy. Does not bruise/bleed easily.   Psychiatric/Behavioral: Negative for behavioral problems and confusion.   All other systems reviewed and are  negative.      Past Medical History:   Diagnosis Date   • Anxiety        No Known Allergies    Past Surgical History:   Procedure Laterality Date   •  SECTION N/A 2022    Procedure:  SECTION PRIMARY;  Surgeon: Sho Louise MD;  Location: [facility] LABOR DELIVERY;  Service: Obstetrics;  Laterality: N/A;   • FOOT FRACTURE SURGERY     • KNEE CARTILAGE SURGERY         Family History   Problem Relation Age of Onset   • Hypertension Mother    • Hypertension Maternal Grandmother    • Diabetes Maternal Grandmother    • Depression Maternal Grandmother        Social History     Socioeconomic History   • Marital status: Single   Tobacco Use   • Smoking status: Never Smoker   • Smokeless tobacco: Never Used   Substance and Sexual Activity   • Alcohol use: Not Currently     Alcohol/week: 0.0 standard drinks   • Drug use: Never   • Sexual activity: Yes     Partners: Male     Birth control/protection: None     Comment: last pap smear 21 negative at [place]            Objective   Physical Exam  Vitals and nursing note reviewed.   Constitutional:       Appearance: She is well-developed.   HENT:      Head: Normocephalic and atraumatic.      Nose: Nose normal.   Eyes:      General: No scleral icterus.        Right eye: No discharge.         Left eye: No discharge.      Conjunctiva/sclera: Conjunctivae normal.      Pupils: Pupils are equal, round, and reactive to light.   Neck:      Trachea: No tracheal deviation.   Cardiovascular:      Rate and Rhythm: Normal rate and regular rhythm.      Heart sounds: Normal heart sounds. No murmur heard.  Pulmonary:      Effort: Pulmonary effort is normal. No respiratory distress.      Breath sounds: Normal breath sounds. No stridor. No wheezing or rales.   Abdominal:      General: Bowel sounds are normal. There is no distension.      Palpations: Abdomen is soft. There is no mass.      Tenderness: There is abdominal tenderness in the epigastric  area. There is no guarding or rebound.   Musculoskeletal:      Cervical back: Normal range of motion and neck supple.   Skin:     General: Skin is warm and dry.      Findings: No erythema or rash.   Neurological:      Mental Status: She is alert and oriented to person, place, and time.      Coordination: Coordination normal.   Psychiatric:         Behavior: Behavior normal.         Thought Content: Thought content normal.         Procedures           ED Course              Labs Reviewed   COMPREHENSIVE METABOLIC PANEL - Abnormal; Notable for the following components:       Result Value    Glucose 111 (*)     ALT (SGPT) 45 (*)     AST (SGOT) 69 (*)     Alkaline Phosphatase 234 (*)     Total Bilirubin 2.6 (*)     All other components within normal limits    Narrative:     GFR Normal >60  Chronic Kidney Disease <60  Kidney Failure <15     URINALYSIS W/ MICROSCOPIC IF INDICATED (NO CULTURE) - Abnormal; Notable for the following components:    Color, UA Orange (*)     Appearance, UA Cloudy (*)     Specific Gravity, UA 1.034 (*)     Ketones, UA Trace (*)     Bilirubin, UA Large (3+) (*)     Protein, UA 30 mg/dL (1+) (*)     Leuk Esterase, UA Small (1+) (*)     Nitrite, UA Positive (*)     Urobilinogen, UA 2.0 E.U./dL (*)     All other components within normal limits   URINALYSIS, MICROSCOPIC ONLY - Abnormal; Notable for the following components:    Bacteria, UA 3+ (*)     Squamous Epithelial Cells, UA 6-12 (*)     Mucus, UA Small/1+ (*)     All other components within normal limits   LIPASE - Normal   CBC WITH AUTO DIFFERENTIAL - Normal   HCG, SERUM, QUALITATIVE - Normal   RAINBOW DRAW    Narrative:     The following orders were created for panel order [place] Draw.  Procedure                               Abnormality         Status                     ---------                               -----------         ------                     Green Top (Gel)[[id number]]                                  Final result                Lavender Top[[id number]]                                     Final result               Gold Top - SST[[id number]]                                   Final result               Light Blue Top[[id number]]                                   Final result                 Please view results for these tests on the individual orders.   CBC AND DIFFERENTIAL    Narrative:     The following orders were created for panel order CBC & Differential.  Procedure                               Abnormality         Status                     ---------                               -----------         ------                     CBC Auto Differential[[id number]]        Normal              Final result                 Please view results for these tests on the individual orders.   GREEN TOP   LAVENDER TOP   GOLD TOP - SST   LIGHT BLUE TOP       XR Abdomen 2+ VW with Chest 1 VW   Final Result      No acute findings.            Electronically signed by:  Forest Menjivar MD  9/4/2022 11:03 PM   CDT Workstation: [id number]      US Gallbladder    (Results Pending)                                         MDM    Final diagnoses:   Calculus of gallbladder and bile duct with obstruction without cholecystitis       ED Disposition  ED Disposition     ED Disposition   Decision to Admit    Condition   --    Comment   Level of Care: Med/Surg [1]   Diagnosis: Calculus of gallbladder and bile duct with obstruction without cholecystitis [[id number]]   Admitting Physician: REGGIE FERREIRA [[id number]]   Attending Physician: REGGIE FERREIRA [[id number]]               No follow-up provider specified.       Medication List      No changes were made to your prescriptions during this visit.          Clayton Ruby MD  09/05/22 0015

## 2022-09-05 NOTE — ANESTHESIA POSTPROCEDURE EVALUATION
Patient: Denise Rod    Procedure Summary     Date: 09/05/22 Room / Location: [facility] OR 03 / [facility] OR    Anesthesia Start: 1138 Anesthesia Stop: 1317    Procedure: CHOLECYSTECTOMY LAPAROSCOPIC INTRAOPERATIVE CHOLANGIOGRAM (N/A Abdomen) Diagnosis:       Calculus of gallbladder and bile duct with obstruction without cholecystitis      (Calculus of gallbladder and bile duct with obstruction without cholecystitis [K80.71])    Surgeons: Jeffry Witt MD Provider: Edwige Fishman CRNA    Anesthesia Type: general ASA Status: 2 - Emergent          Anesthesia Type: general    Vitals  No vitals data found for the desired time range.          Post Anesthesia Care and Evaluation    Patient location during evaluation: PACU  Patient participation: complete - patient participated  Level of consciousness: awake  Pain score: 3  Pain management: adequate    Airway patency: patent  Anesthetic complications: No anesthetic complications  PONV Status: none  Cardiovascular status: acceptable and hemodynamically stable  Respiratory status: acceptable, room air and spontaneous ventilation  Hydration status: acceptable    Comments: ---------------------------               09/05/22 1317         ---------------------------   BP:          140/68         Pulse:         62           Resp:          16           Temp:   97.6 °F (36.4 °C)   SpO2:         97%         ---------------------------

## 2022-09-05 NOTE — OP NOTE
CHOLECYSTECTOMY LAPAROSCOPIC INTRAOPERATIVE CHOLANGIOGRAM  Procedure Report    Patient Name:  Denise Rod  YOB: 1995    Date of Surgery:  9/5/2022     Indications: 27-year-old female with right upper quadrant abdominal pain, gallstones, and elevated bilirubin.    Pre-op Diagnosis:   Calculus of gallbladder and bile duct with obstruction without cholecystitis [K80.71]       Post-Op Diagnosis Codes:     * Calculus of gallbladder and bile duct with obstruction without cholecystitis [K80.71]    Procedure/CPT® Codes:      Procedure(s):  CHOLECYSTECTOMY LAPAROSCOPIC INTRAOPERATIVE CHOLANGIOGRAM    Staff:  Surgeon(s):  Jeffry Witt MD    Assistant: Alma Ugalde CSA    Anesthesia: General    Estimated Blood Loss: 20 mL      Specimen:          Specimens     ID Source Type Tests Collected By Collected At Frozen?    A Gallbladder Tissue · TISSUE EXAM, [facility] ([facility])   Jeffry Witt MD 9/5/22 1226     This specimen was not marked as sent.              Findings: Numerous gallstones in the gallbladder had at least 1 stone milked back from the cystic duct.  Incomplete cholangiogram with filling of the cystic and hepatic ducts, but no filling of the common bile duct due to leakage of contrast at the catheter site.      Description of Procedure: After informed consent was obtained, the patient was brought to the operating room and induced under general anesthesia.  They were prepped and draped in usual sterile fashion.  A timeout was performed.  The abdomen was entered in the right upper quadrant with a 5 mm trocar using Optiview technique.  Additional 5 mm trochars were placed right laterally and supraumbilically.  An 11 mm trocar was placed subxiphoid.  The fundus of the gallbladder was grasped and retracted cephalad.  The infundibulum was retracted laterally.  The cystic artery and cystic duct were isolated using blunt dissection.  A duct ottoman was made in the cystic duct and a  cholangiogram catheter was inserted.  2 stones were milked out of the cystic duct.  The cholangiogram catheter was secured in the duct with clips, but I was never able to get an adequate seal.  A cholangiogram was attempted, but due to the leak I was never able to get an adequate view of the common bile duct.  There was filling of the cystic duct and hepatic ducts without defects, but no contrast made it into the distal common bile duct.  The cholangiogram catheter was removed.  The cystic duct was clipped twice proximally, once distally, and divided.  The cystic artery was clipped twice proximally, once distally, and divided.  The gallbladder was then dissected off of the liver bed using electrocautery.  The gallbladder was then removed via the subxiphoid incision using an Endo Catch bag.  The gallbladder was entered during dissection. All stones were removed and the site was irrigated until the effluent ran clear. The gallbladder fossa was inspected and noted to be hemostatic.  All ports were removed under direct visualization and noted to be hemostatic.    All skin incisions were closed with 4-0 Monocryl and skin glue.  The patient tolerated this procedure well without any immediate complication.    Complications: None    Assistant: Alma Ugalde CSA  was responsible for performing the following activities: Retraction, Closing and Running the camera and their skilled assistance was necessary for the success of this case.    Jeffry Witt MD     Date: 9/5/2022  Time: 13:01 FAUSTO

## 2022-09-05 NOTE — ANESTHESIA PREPROCEDURE EVALUATION
Anesthesia Evaluation     Patient summary reviewed and Nursing notes reviewed   no history of anesthetic complications:  NPO Solid Status: > 8 hours  NPO Liquid Status: > 8 hours           Airway   Mallampati: III  TM distance: >3 FB  Neck ROM: full  Possible difficult intubation and Large neck circumference  Dental - normal exam     Pulmonary - normal exam   (-) not a smoker  Cardiovascular - negative cardio ROS and normal exam    (-) hypertension      Neuro/Psych  (+) psychiatric history Anxiety,    (-) seizures, CVA  GI/Hepatic/Renal/Endo    (+) morbid obesity,      Musculoskeletal (-) negative ROS    Abdominal    Substance History - negative use     OB/GYN negative ob/gyn ROS         Other - negative ROS                     Anesthesia Plan    ASA 2 - emergent     general     intravenous induction     Anesthetic plan, risks, benefits, and alternatives have been provided, discussed and informed consent has been obtained with: patient.        CODE STATUS:    Code Status (Patient has no pulse and is not breathing): CPR (Attempt to Resuscitate)  Medical Interventions (Patient has pulse or is breathing): Full Support  Release to patient: Routine Release

## 2022-09-05 NOTE — ED NOTES
"Nursing report ED to floor  Denise Rod  27 y.o.  female    HPI:   Chief Complaint   Patient presents with   • Abdominal Pain   • Back Pain       Admitting doctor:   Jeffry MCKAY MD    Consulting provider(s):  Consults     Date and Time Order Name Status Description    9/5/2022 12:13 AM Surgery (on-call MD unless specified)             Admitting diagnosis:   The encounter diagnosis was Calculus of gallbladder and bile duct with obstruction without cholecystitis.    Code status:   Current Code Status     Date Active Code Status Order ID Comments User Context       Prior    Advance Care Planning Activity          Allergies:   Patient has no known allergies.    Intake and Output  No intake or output data in the 24 hours ending 09/05/22 0026    Weight:       09/04/22 2040   Weight: 136 kg (300 lb)       Most recent vitals:   Vitals:    09/04/22 2040 09/04/22 2154 09/04/22 2256   BP: 153/96 145/86 138/84   BP Location: Right arm Left arm    Patient Position: Sitting Sitting    Pulse: 87 84 82   Resp: 17 18 16   Temp: 97.9 °F (36.6 °C)     TempSrc: Oral     SpO2: 100% 97% 98%   Weight: 136 kg (300 lb)     Height: 162.6 cm (64\")         Active LDAs/IV Access:   Lines, Drains & Airways     Active LDAs     Name Placement date Placement time Site Days    Peripheral IV 09/04/22 2118 Right Antecubital 09/04/22 2118  Antecubital  less than 1                Labs (abnormal labs have a star):   Labs Reviewed   COMPREHENSIVE METABOLIC PANEL - Abnormal; Notable for the following components:       Result Value    Glucose 111 (*)     ALT (SGPT) 45 (*)     AST (SGOT) 69 (*)     Alkaline Phosphatase 234 (*)     Total Bilirubin 2.6 (*)     All other components within normal limits    Narrative:     GFR Normal >60  Chronic Kidney Disease <60  Kidney Failure <15     URINALYSIS W/ MICROSCOPIC IF INDICATED (NO CULTURE) - Abnormal; Notable for the following components:    Color, UA Orange (*)     Appearance, UA Cloudy (*)     " Specific Gravity, UA 1.034 (*)     Ketones, UA Trace (*)     Bilirubin, UA Large (3+) (*)     Protein, UA 30 mg/dL (1+) (*)     Leuk Esterase, UA Small (1+) (*)     Nitrite, UA Positive (*)     Urobilinogen, UA 2.0 E.U./dL (*)     All other components within normal limits   URINALYSIS, MICROSCOPIC ONLY - Abnormal; Notable for the following components:    Bacteria, UA 3+ (*)     Squamous Epithelial Cells, UA 6-12 (*)     Mucus, UA Small/1+ (*)     All other components within normal limits   LIPASE - Normal   CBC WITH AUTO DIFFERENTIAL - Normal   HCG, SERUM, QUALITATIVE - Normal   RAINBOW DRAW    Narrative:     The following orders were created for panel order [place] Draw.  Procedure                               Abnormality         Status                     ---------                               -----------         ------                     Green Top (Gel)[[id number]]                                  Final result               Lavender Top[[id number]]                                     Final result               Gold Top - SST[[id number]]                                   Final result               Light Blue Top[[id number]]                                   Final result                 Please view results for these tests on the individual orders.   CBC AND DIFFERENTIAL    Narrative:     The following orders were created for panel order CBC & Differential.  Procedure                               Abnormality         Status                     ---------                               -----------         ------                     CBC Auto Differential[[id number]]        Normal              Final result                 Please view results for these tests on the individual orders.   GREEN TOP   LAVENDER TOP   GOLD TOP - SST   LIGHT BLUE TOP       Meds given in ED:   Medications   sodium chloride 0.9 % flush 10 mL (has no administration in time range)   sodium chloride 0.9 % infusion (125 mL/hr Intravenous New Bag 9/5/22  0017)   aluminum-magnesium hydroxide-simethicone (MAALOX MAX) 400-400-40 MG/5ML suspension 15 mL (15 mL Oral Given 9/4/22 2200)   Lidocaine Viscous HCl (XYLOCAINE) 2 % solution 15 mL (15 mL Mouth/Throat Given 9/4/22 2200)     sodium chloride, 125 mL/hr, Last Rate: 125 mL/hr (09/05/22 0017)         NIH Stroke Scale:       Isolation/Infection(s):  No active isolations   No active infections     COVID Testing  Collected No  Resulted N/A    Nursing report ED to floor:  Mental status: Alert and Oriented  Ambulatory status: Self  Precautions: None    ED nurse phone [phone]

## 2022-09-05 NOTE — PLAN OF CARE
Goal Outcome Evaluation:  Plan of Care Reviewed With: patient        Progress: improving  Outcome Evaluation: VSS. Admitted to unit. IV fluids infusing. Room air. PRN norco for pain. NPO.

## 2022-09-05 NOTE — PLAN OF CARE
Goal Outcome Evaluation:      Vss this shift. Pt returned from surgery around 1400. Medicated for pain. Tolerating clear liquids and advanced to fulls. Pt denies n/v. Pt encourage to ambulate to the restroom but refusing to get up at this time. Pt educated on the risks and benefits of not moving around. Will continue to monitor.

## 2022-09-06 LAB
ALBUMIN SERPL-MCNC: 3.8 G/DL (ref 3.5–5.2)
ALBUMIN/GLOB SERPL: 1.1 G/DL
ALP SERPL-CCNC: 300 U/L (ref 39–117)
ALT SERPL W P-5'-P-CCNC: 88 U/L (ref 1–33)
ANION GAP SERPL CALCULATED.3IONS-SCNC: 9 MMOL/L (ref 5–15)
AST SERPL-CCNC: 134 U/L (ref 1–32)
BILIRUB SERPL-MCNC: 3.7 MG/DL (ref 0–1.2)
BUN SERPL-MCNC: 6 MG/DL (ref 6–20)
BUN/CREAT SERPL: 8.5 (ref 7–25)
CALCIUM SPEC-SCNC: 8.8 MG/DL (ref 8.6–10.5)
CHLORIDE SERPL-SCNC: 105 MMOL/L (ref 98–107)
CO2 SERPL-SCNC: 27 MMOL/L (ref 22–29)
CREAT SERPL-MCNC: 0.71 MG/DL (ref 0.57–1)
EGFRCR SERPLBLD CKD-EPI 2021: 121.2 ML/MIN/1.73
FLUAV RNA RESP QL NAA+PROBE: NOT DETECTED
FLUBV RNA RESP QL NAA+PROBE: NOT DETECTED
GLOBULIN UR ELPH-MCNC: 3.6 GM/DL
GLUCOSE SERPL-MCNC: 115 MG/DL (ref 65–99)
POTASSIUM SERPL-SCNC: 3.5 MMOL/L (ref 3.5–5.2)
PROT SERPL-MCNC: 7.4 G/DL (ref 6–8.5)
SARS-COV-2 RNA RESP QL NAA+PROBE: NOT DETECTED
SODIUM SERPL-SCNC: 141 MMOL/L (ref 136–145)
WHOLE BLOOD HOLD SPECIMEN: NORMAL

## 2022-09-06 PROCEDURE — 99024 POSTOP FOLLOW-UP VISIT: CPT | Performed by: SURGERY

## 2022-09-06 PROCEDURE — 80053 COMPREHEN METABOLIC PANEL: CPT | Performed by: SURGERY

## 2022-09-06 PROCEDURE — G0378 HOSPITAL OBSERVATION PER HR: HCPCS

## 2022-09-06 PROCEDURE — 87636 SARSCOV2 & INF A&B AMP PRB: CPT | Performed by: SURGERY

## 2022-09-06 RX ADMIN — HYDROCODONE BITARTRATE AND ACETAMINOPHEN 1 TABLET: 5; 325 TABLET ORAL at 01:38

## 2022-09-06 RX ADMIN — SODIUM CHLORIDE 125 ML/HR: 9 INJECTION, SOLUTION INTRAVENOUS at 16:18

## 2022-09-06 RX ADMIN — SODIUM CHLORIDE 125 ML/HR: 9 INJECTION, SOLUTION INTRAVENOUS at 08:17

## 2022-09-06 RX ADMIN — HYDROCODONE BITARTRATE AND ACETAMINOPHEN 1 TABLET: 5; 325 TABLET ORAL at 05:58

## 2022-09-06 RX ADMIN — HYDROCODONE BITARTRATE AND ACETAMINOPHEN 1 TABLET: 5; 325 TABLET ORAL at 16:14

## 2022-09-06 NOTE — PLAN OF CARE
Goal Outcome Evaluation: Pt. VSS. Pain well controlled on po pain meds. Pt. Tolerated full liquid diet w/o n/v. Pt. Ambulating w/o difficulty.

## 2022-09-06 NOTE — PLAN OF CARE
Goal Outcome Evaluation:     Pt passing gas and did have one bowel movement towards the end of shift. Pt pain is well controled. Iv fluids hanging. No s/s of distress noted.

## 2022-09-06 NOTE — CONSULTS
Corbin Lowe DO,[facility]  Gastroenterology  Hepatology  Endoscopy  Board Certified in Internal Medicine and gastroenterology  [address], [address]  [place]. [address]  - [phone]   F - [phone]     GASTROENTEROLOGY CONSULT NOTE   CORBIN LOWE DO.         SUBJECTIVE:   9/6/2022    Name: Denise Rod  DOD: 1995    REASON FOR CONSULT: Possible common bile duct stone    Chief Complaint:     Chief Complaint   Patient presents with   • Abdominal Pain   • Back Pain       Subjective : Jaundice, worsening    Patient is 27 y.o. female, status post laparoscopic cholecystectomy with inability to perform interoperative cholangiogram, presents with worsening jaundice.  The patient said that she was doing well until Saturday when she began to have more pain and more gas type symptoms.  She came to the hospital emergency room where she was found to have acute cholecystitis.  She had elevations of liver enzyme test as well as dilation of the intrahepatic ductal system.  The patient had a laparoscopic cholecystectomy performed with removal of cystic duct stone.  The patient had an attempted interoperative cholangiogram which was not able to be done because of dye that was returning from the catheter.  It was felt to be under pressure.  The patient's bilirubin has risen today.    We are asked to see the patient for possible ERCP.  Currently, the patient has no abdominal pain, except for postoperative pain.    Has had dark-colored urine since Thursday.     ROS/HISTORY/ CURRENT MEDICATIONS/OBJECTIVE/VS/PE:   Review of Systems:   Review of Systems   Constitutional: Negative.    HENT: Negative.    Eyes: Negative.    Respiratory: Negative.    Cardiovascular: Negative.    Gastrointestinal: Positive for abdominal pain.   Endocrine: Negative.    Genitourinary: Negative.    Musculoskeletal: Positive for arthralgias and back pain.   Skin: Positive for color change.   Allergic/Immunologic: Negative.     Neurological: Negative.    Hematological: Negative.    Psychiatric/Behavioral: The patient is nervous/anxious.        History:     Past Medical History:   Diagnosis Date   • Anxiety      Past Surgical History:   Procedure Laterality Date   •  SECTION N/A 2022    Procedure:  SECTION PRIMARY;  Surgeon: Sho Louise MD;  Location: [facility] LABOR DELIVERY;  Service: Obstetrics;  Laterality: N/A;   • FOOT FRACTURE SURGERY     • KNEE CARTILAGE SURGERY       Family History   Problem Relation Age of Onset   • Hypertension Mother    • Hypertension Maternal Grandmother    • Diabetes Maternal Grandmother    • Depression Maternal Grandmother      Social History     Tobacco Use   • Smoking status: Never Smoker   • Smokeless tobacco: Never Used   Substance Use Topics   • Alcohol use: Not Currently     Alcohol/week: 0.0 standard drinks   • Drug use: Never     Medications Prior to Admission   Medication Sig Dispense Refill Last Dose   • norethindrone-ethinyl estradiol FE (Junel FE 1/20) 1-20 MG-MCG per tablet Take 1 tablet by mouth Daily. 28 tablet 12 2022 at Unknown time   • sertraline (ZOLOFT) 50 MG tablet TAKE 1 TABLET BY MOUTH DAILY 30 tablet 5 2022 at Unknown time   • acetaminophen (Tylenol) 325 MG tablet Take 2 tablets by mouth Every 4 (Four) Hours As Needed for Mild Pain  or Moderate Pain . 100 tablet 2 Unknown at Unknown time   • cetirizine (zyrTEC) 5 MG tablet Take 1 tablet by mouth Daily. 30 tablet 2 Unknown at Unknown time   • ibuprofen (ADVIL,MOTRIN) 800 MG tablet Take 1 tablet by mouth Every 8 (Eight) Hours As Needed for Mild Pain  or Moderate Pain . 40 tablet 1 Unknown at Unknown time     Allergies:  Patient has no known allergies.    I have reviewed the patient's medical history, surgical history and family history in the available medical record system.     Current Medications:     Current Facility-Administered Medications   Medication Dose Route Frequency  Provider Last Rate Last Admin   • HYDROcodone-acetaminophen (NORCO) 5-325 MG per tablet 1 tablet  1 tablet Oral Q4H PRN Jeffry Witt MD   1 tablet at 09/06/22 0558   • ondansetron ODT (ZOFRAN-ODT) disintegrating tablet 4 mg  4 mg Oral Q4H PRN Jeffry Witt MD       • sodium chloride 0.9 % flush 10 mL  10 mL Intravenous PRN Jeffry Witt MD   10 mL at 09/05/22 1959   • sodium chloride 0.9 % infusion  125 mL/hr Intravenous Continuous Jeffry Witt  mL/hr at 09/06/22 0817 125 mL/hr at 09/06/22 0817       Objective     Physical Exam:   Temp:  [96.8 °F (36 °C)-98.8 °F (37.1 °C)] 97.2 °F (36.2 °C)  Heart Rate:  [54-74] 60  Resp:  [16-18] 18  BP: (133-158)/(65-96) 154/74    Physical Exam:  General Appearance:    Alert, cooperative, in no acute distress   Head:    Normocephalic, without obvious abnormality, atraumatic   Eyes:            Lids and lashes normal, conjunctivae and sclerae normal, no icterus, no pallor, corneas clear, PERRLA   Ears:    Ears appear intact with no abnormalities noted   Throat:   No oral lesions, no thrush, oral mucosa moist   Neck:   No adenopathy, supple, trachea midline, no thyromegaly, no carotid bruit, no JVD   Back:     No kyphosis present, no scoliosis present, no skin lesions,    erythema or scars, no tenderness to percussion or  palpation,   range of motion normal   Lungs:     Clear to auscultation,respirations regular, even and  unlabored    Heart:    Regular rhythm and normal rate, normal S1 and S2, no  murmur, no gallop, no rub, no click   Breast Exam:    Deferred   Abdomen:     Normal bowel sounds, no masses, no organomegaly, soft  nontender, nondistended, no guarding, no rebound  tenderness   Genitalia:    Deferred   Extremities:   Moves all extremities well, no edema, no cyanosis, no redness   Pulses:   Pulses palpable and equal bilaterally   Skin:   No bleeding, bruising or rash   Lymph nodes:   No palpable adenopathy   Neurologic:   Cranial nerves 2 - 12 grossly  intact, sensation intact, DTR  present and equal bilaterally      Results Review:     Lab Results   Component Value Date    WBC 6.96 09/05/2022    WBC 8.44 09/04/2022    WBC 10.53 01/18/2022    HGB 11.1 (L) 09/05/2022    HGB 12.1 09/04/2022    HGB 10.0 (L) 01/21/2022    HCT 34.1 09/05/2022    HCT 35.8 09/04/2022    HCT 30.2 (L) 01/21/2022     09/05/2022     09/04/2022     01/18/2022     Results from last 7 days   Lab Units 09/06/22  0523 09/05/22  0735 09/04/22 2121   ALK PHOS U/L 300* 227* 234*   ALT (SGPT) U/L 88* 46* 45*   AST (SGOT) U/L 134* 62* 69*     Results from last 7 days   Lab Units 09/06/22  0523 09/05/22  0735 09/04/22 2121   BILIRUBIN mg/dL 3.7* 2.2* 2.6*   ALK PHOS U/L 300* 227* 234*     Lipase   Date Value Ref Range Status   09/04/2022 17 13 - 60 U/L Final     No results found for: INR  No results found for: CSFCX    Radiology Review:  Imaging Results (Last 72 Hours)     Procedure Component Value Units Date/Time    FL C Arm During Surgery [[id number]] Resulted: 09/06/22 0816     Updated: 09/06/22 0816    US Gallbladder [[id number]] Collected: 09/04/22 2310     Updated: 09/05/22 0036    Narrative:      EXAM: Right upper quadrant abdominal ultrasound.  INDICATION: upper abdominal pain, elevated LFTs  COMPARISON: Radiograph from same day    FINDINGS:   Pancreas: The visualized portions of the pancreas are  unremarkable.    Aorta: No evidence of aneurysm in the visualized abdominal aorta.    Liver: Suspect increased echogenic appearance of the portal  triads. No focal hepatic mass.    IVC: No evidence of thrombus in the visualized juxtahepatic IVC.    Gallbladder: Cholelithiasis without the lateral wall thickening,  pericholecystic fluid, or other abnormality. The Hayes sign is  not reported    Biliary: No intrahepatic biliary ductal dilatation. The common  duct is mildly dilated, measuring approximately 9 mm at the mine  hepatis.    Right kidney: The right kidney measures 11.1 cm  without  hydronephrosis or shadowing calculi.       Impression:        Numerable gallstones without other sonographic evidence to  suggest acute cholecystitis.    Mild dilation of the common bile duct without obstructing stone  visualized. Laboratory correlation is requested, and MRCP could  be considered to help rule out choledocholithiasis.    Findings suggestive of hepatitis. Clinical correlation  recommended.    Electronically signed by:  Forest Menjivar MD  9/5/2022 12:34 AM  CDT Workstation: [id number]    XR Abdomen 2+ VW with Chest 1 VW [[id number]] Collected: 09/04/22 2158     Updated: 09/04/22 2305    Narrative:      EXAMINATION:  XR ABDOMEN 2 VIEW WITH ONE VIEW CHEST (ABD ACUTE  SERIES)  INDICATION:   epigastric pain.  TECHNIQUE:  XR ABDOMEN SUPINE AND ERECT WITH CHEST (ABD ACUTE  SERIES)    COMPARISON: None    FINDINGS:    Cardiac mediastinal silhouette is within normal limits.  No focal consolidation, pleural effusion, or pneumothorax.    No evidence of free air.  No mass effect.  Nonobstructive bowel gas pattern.  No suspicious calcifications.      No other significant findings.      Impression:        No acute findings.        Electronically signed by:  Forest Menjivar MD  9/4/2022 11:03 PM  CDT Workstation: [id number]           I reviewed the patient's new clinical results.  I reviewed the patient's new imaging results and agree with the interpretation.     ASSESSMENT/PLAN:   ASSESSMENT:  1.  Suspected common bile duct stone without cholangitis    PLAN:  1.  Endoscopic retrograde cholangiogram with endoscopic sphincterotomy with removal of common bile duct stone and placement of straight plastic biliary stent    Risk and benefits associated with the procedure have been reviewed with the patient as well as the .  They wish to proceed.  We have gotten permission from the insurance company and we will do this tomorrow     Tyrone Louise DO  09/06/22  12:37 CDT

## 2022-09-07 ENCOUNTER — ANESTHESIA (OUTPATIENT)
Dept: GASTROENTEROLOGY | Facility: HOSPITAL | Age: 27
End: 2022-09-07

## 2022-09-07 ENCOUNTER — ANESTHESIA EVENT (OUTPATIENT)
Dept: GASTROENTEROLOGY | Facility: HOSPITAL | Age: 27
End: 2022-09-07

## 2022-09-07 ENCOUNTER — APPOINTMENT (OUTPATIENT)
Dept: GENERAL RADIOLOGY | Facility: HOSPITAL | Age: 27
End: 2022-09-07

## 2022-09-07 LAB — B-HCG UR QL: NEGATIVE

## 2022-09-07 PROCEDURE — 25010000002 IOPAMIDOL 61 % SOLUTION: Performed by: SURGERY

## 2022-09-07 PROCEDURE — 81025 URINE PREGNANCY TEST: CPT | Performed by: SURGERY

## 2022-09-07 PROCEDURE — G0378 HOSPITAL OBSERVATION PER HR: HCPCS

## 2022-09-07 PROCEDURE — 25010000002 SUCCINYLCHOLINE PER 20 MG

## 2022-09-07 PROCEDURE — 25010000002 MIDAZOLAM PER 1 MG

## 2022-09-07 PROCEDURE — 25010000002 FENTANYL CITRATE (PF) 50 MCG/ML SOLUTION

## 2022-09-07 PROCEDURE — 74328 X-RAY BILE DUCT ENDOSCOPY: CPT

## 2022-09-07 PROCEDURE — 63710000001 ONDANSETRON ODT 4 MG TABLET DISPERSIBLE: Performed by: INTERNAL MEDICINE

## 2022-09-07 PROCEDURE — C2625 STENT, NON-COR, TEM W/DEL SY: HCPCS | Performed by: INTERNAL MEDICINE

## 2022-09-07 PROCEDURE — 99024 POSTOP FOLLOW-UP VISIT: CPT | Performed by: SURGERY

## 2022-09-07 PROCEDURE — C1769 GUIDE WIRE: HCPCS | Performed by: INTERNAL MEDICINE

## 2022-09-07 PROCEDURE — 25010000002 PROPOFOL 10 MG/ML EMULSION

## 2022-09-07 DEVICE — BILIARY STENT WITH DELIVERY SYSTEM
Type: IMPLANTABLE DEVICE | Site: BILE DUCT | Status: NON-FUNCTIONAL
Brand: FLEXIMA™ BILIARY
Removed: 2022-11-15

## 2022-09-07 RX ORDER — LIDOCAINE HYDROCHLORIDE 20 MG/ML
INJECTION, SOLUTION INFILTRATION; PERINEURAL AS NEEDED
Status: DISCONTINUED | OUTPATIENT
Start: 2022-09-07 | End: 2022-09-07 | Stop reason: SURG

## 2022-09-07 RX ORDER — NALOXONE HCL 0.4 MG/ML
0.4 VIAL (ML) INJECTION AS NEEDED
Status: DISCONTINUED | OUTPATIENT
Start: 2022-09-07 | End: 2022-09-07 | Stop reason: HOSPADM

## 2022-09-07 RX ORDER — MEPERIDINE HYDROCHLORIDE 25 MG/ML
12.5 INJECTION INTRAMUSCULAR; INTRAVENOUS; SUBCUTANEOUS
Status: DISCONTINUED | OUTPATIENT
Start: 2022-09-07 | End: 2022-09-07 | Stop reason: HOSPADM

## 2022-09-07 RX ORDER — DIPHENHYDRAMINE HYDROCHLORIDE 50 MG/ML
12.5 INJECTION INTRAMUSCULAR; INTRAVENOUS
Status: DISCONTINUED | OUTPATIENT
Start: 2022-09-07 | End: 2022-09-07 | Stop reason: HOSPADM

## 2022-09-07 RX ORDER — ONDANSETRON 2 MG/ML
4 INJECTION INTRAMUSCULAR; INTRAVENOUS ONCE AS NEEDED
Status: DISCONTINUED | OUTPATIENT
Start: 2022-09-07 | End: 2022-09-07 | Stop reason: HOSPADM

## 2022-09-07 RX ORDER — PROPOFOL 10 MG/ML
VIAL (ML) INTRAVENOUS AS NEEDED
Status: DISCONTINUED | OUTPATIENT
Start: 2022-09-07 | End: 2022-09-07 | Stop reason: SURG

## 2022-09-07 RX ORDER — FLUMAZENIL 0.1 MG/ML
0.2 INJECTION INTRAVENOUS AS NEEDED
Status: DISCONTINUED | OUTPATIENT
Start: 2022-09-07 | End: 2022-09-07 | Stop reason: HOSPADM

## 2022-09-07 RX ORDER — ACETAMINOPHEN 650 MG/1
650 SUPPOSITORY RECTAL ONCE AS NEEDED
Status: DISCONTINUED | OUTPATIENT
Start: 2022-09-07 | End: 2022-09-07 | Stop reason: HOSPADM

## 2022-09-07 RX ORDER — SUCCINYLCHOLINE CHLORIDE 20 MG/ML
INJECTION INTRAMUSCULAR; INTRAVENOUS AS NEEDED
Status: DISCONTINUED | OUTPATIENT
Start: 2022-09-07 | End: 2022-09-07 | Stop reason: SURG

## 2022-09-07 RX ORDER — ACETAMINOPHEN 325 MG/1
650 TABLET ORAL ONCE AS NEEDED
Status: DISCONTINUED | OUTPATIENT
Start: 2022-09-07 | End: 2022-09-07 | Stop reason: HOSPADM

## 2022-09-07 RX ORDER — PROMETHAZINE HYDROCHLORIDE 25 MG/1
25 TABLET ORAL ONCE AS NEEDED
Status: DISCONTINUED | OUTPATIENT
Start: 2022-09-07 | End: 2022-09-07 | Stop reason: HOSPADM

## 2022-09-07 RX ORDER — EPHEDRINE SULFATE 50 MG/ML
5 INJECTION, SOLUTION INTRAVENOUS ONCE AS NEEDED
Status: DISCONTINUED | OUTPATIENT
Start: 2022-09-07 | End: 2022-09-07 | Stop reason: HOSPADM

## 2022-09-07 RX ORDER — FENTANYL CITRATE 50 UG/ML
INJECTION, SOLUTION INTRAMUSCULAR; INTRAVENOUS AS NEEDED
Status: DISCONTINUED | OUTPATIENT
Start: 2022-09-07 | End: 2022-09-07 | Stop reason: SURG

## 2022-09-07 RX ORDER — PROMETHAZINE HYDROCHLORIDE 25 MG/1
25 SUPPOSITORY RECTAL ONCE AS NEEDED
Status: DISCONTINUED | OUTPATIENT
Start: 2022-09-07 | End: 2022-09-07 | Stop reason: HOSPADM

## 2022-09-07 RX ORDER — MIDAZOLAM HYDROCHLORIDE 1 MG/ML
INJECTION INTRAMUSCULAR; INTRAVENOUS AS NEEDED
Status: DISCONTINUED | OUTPATIENT
Start: 2022-09-07 | End: 2022-09-07 | Stop reason: SURG

## 2022-09-07 RX ADMIN — HYDROCODONE BITARTRATE AND ACETAMINOPHEN 1 TABLET: 5; 325 TABLET ORAL at 08:26

## 2022-09-07 RX ADMIN — PROPOFOL 150 MG: 10 INJECTION, EMULSION INTRAVENOUS at 16:59

## 2022-09-07 RX ADMIN — SODIUM CHLORIDE: 9 INJECTION, SOLUTION INTRAVENOUS at 16:55

## 2022-09-07 RX ADMIN — ONDANSETRON 4 MG: 4 TABLET, ORALLY DISINTEGRATING ORAL at 20:39

## 2022-09-07 RX ADMIN — SUCCINYLCHOLINE CHLORIDE 140 MG: 20 INJECTION, SOLUTION INTRAMUSCULAR; INTRAVENOUS at 16:59

## 2022-09-07 RX ADMIN — HYDROCODONE BITARTRATE AND ACETAMINOPHEN 1 TABLET: 5; 325 TABLET ORAL at 20:39

## 2022-09-07 RX ADMIN — SODIUM CHLORIDE 125 ML/HR: 9 INJECTION, SOLUTION INTRAVENOUS at 00:26

## 2022-09-07 RX ADMIN — LIDOCAINE HYDROCHLORIDE 50 MG: 20 INJECTION, SOLUTION INFILTRATION; PERINEURAL at 16:59

## 2022-09-07 RX ADMIN — SODIUM CHLORIDE 125 ML/HR: 9 INJECTION, SOLUTION INTRAVENOUS at 18:29

## 2022-09-07 RX ADMIN — MIDAZOLAM HYDROCHLORIDE 2 MG: 1 INJECTION, SOLUTION INTRAMUSCULAR; INTRAVENOUS at 16:55

## 2022-09-07 RX ADMIN — IOPAMIDOL 18 ML: 612 INJECTION, SOLUTION INTRAVENOUS at 17:32

## 2022-09-07 RX ADMIN — SODIUM CHLORIDE 125 ML/HR: 9 INJECTION, SOLUTION INTRAVENOUS at 08:24

## 2022-09-07 RX ADMIN — PROPOFOL 50 MG: 10 INJECTION, EMULSION INTRAVENOUS at 17:07

## 2022-09-07 RX ADMIN — FENTANYL CITRATE 100 MCG: 50 INJECTION INTRAMUSCULAR; INTRAVENOUS at 16:56

## 2022-09-07 NOTE — ANESTHESIA POSTPROCEDURE EVALUATION
Patient: Denise Rod    Procedure Summary     Date: 09/07/22 Room / Location: [facility] ENDOSCOPY 2 / [facility] ENDOSCOPY    Anesthesia Start: 1655 Anesthesia Stop: 1737    Procedure: ENDOSCOPIC RETROGRADE CHOLANGIOPANCREATOGRAPHY WITH STENT PLACEMENT (N/A ) Diagnosis:       Calculus of gallbladder and bile duct with obstruction without cholecystitis      (Calculus of gallbladder and bile duct with obstruction without cholecystitis [K80.71])    Surgeons: Tyrone Louise DO Provider: Sherri Gutierrez CRNA    Anesthesia Type: general ASA Status: 3          Anesthesia Type: general    Vitals  No vitals data found for the desired time range.          Post Anesthesia Care and Evaluation    Patient location during evaluation: PACU  Patient participation: complete - patient cannot participate  Level of consciousness: sleepy but conscious  Pain score: 0  Pain management: adequate    Airway patency: patent  Anesthetic complications: No anesthetic complications  PONV Status: none  Cardiovascular status: acceptable  Respiratory status: acceptable  Hydration status: acceptable  No anesthesia care post op

## 2022-09-07 NOTE — ANESTHESIA PROCEDURE NOTES
Airway  Urgency: elective    Date/Time: 9/7/2022 4:59 PM  Airway not difficult    General Information and Staff    Patient location during procedure: OR  CRNA/CAA: Lambert Bliss CRNA    Indications and Patient Condition  Indications for airway management: airway protection    Preoxygenated: yes  MILS maintained throughout  Mask difficulty assessment: 1 - vent by mask    Final Airway Details  Final airway type: endotracheal airway      Successful airway: ETT  Cuffed: yes   Successful intubation technique: direct laryngoscopy  Endotracheal tube insertion site: oral  Blade: Castillo  Blade size: 3  ETT size (mm): 7.5  Cormack-Lehane Classification: grade I - full view of glottis  Placement verified by: chest auscultation and capnometry   Cuff volume (mL): 7  Measured from: lips  ETT/EBT  to lips (cm): 20  Number of attempts at approach: 1  Assessment: lips, teeth, and gum same as pre-op and atraumatic intubation

## 2022-09-07 NOTE — PLAN OF CARE
Goal Outcome Evaluation:           Progress: improving. Pt. Denies need for pain medication this shift. Iv fluids maintained. VSS.

## 2022-09-07 NOTE — PROGRESS NOTES
Corbin Lowe DO,[facility]  Gastroenterology  Hepatology  Endoscopy  Board Certified in Internal Medicine and gastroenterology  [address], [address]  [place]. [address]  - [phone]   F - [phone]     GASTROENTEROLOGY PROGRESS NOTE   CORBIN LOWE DO.         SUBJECTIVE:   9/7/2022  Chief Complaint:     Subjective  : Here for ERCP with removal of common bile duct stone    Patient is 27 y.o. female presents with desire for elective ERCP with removal of common bile duct stone and placement of straight plastic biliary stent.      CURRENT MEDICATIONS/OBJECTIVE/VS/PE:     Current Medications:     Current Facility-Administered Medications   Medication Dose Route Frequency Provider Last Rate Last Admin   • HYDROcodone-acetaminophen (NORCO) 5-325 MG per tablet 1 tablet  1 tablet Oral Q4H PRN Jeffry Witt MD   1 tablet at 09/07/22 0826   • ondansetron ODT (ZOFRAN-ODT) disintegrating tablet 4 mg  4 mg Oral Q4H PRN Jeffry Witt MD       • sodium chloride 0.9 % flush 10 mL  10 mL Intravenous PRN Jeffry Witt MD   10 mL at 09/05/22 1959   • sodium chloride 0.9 % infusion  125 mL/hr Intravenous Continuous Jeffry Witt  mL/hr at 09/07/22 0824 125 mL/hr at 09/07/22 0824       Objective     Physical Exam:   Temp:  [97.1 °F (36.2 °C)-97.9 °F (36.6 °C)] 97.7 °F (36.5 °C)  Heart Rate:  [63-80] 75  Resp:  [18] 18  BP: (132-169)/(74-94) 132/90     Physical Exam:  General Appearance:    Alert, cooperative, in no acute distress   Head:    Normocephalic, without obvious abnormality, atraumatic   Eyes:            Lids and lashes normal, conjunctivae and sclerae normal, no icterus, no pallor, corneas clear, PERRLA   Ears:    Ears appear intact with no abnormalities noted   Throat:   No oral lesions, no thrush, oral mucosa moist   Neck:   No adenopathy, supple, trachea midline, no thyromegaly, no  carotid bruit, no JVD   Back:     No kyphosis present, no scoliosis present, no skin lesions,    erythema or scars, no tenderness to percussion or  palpation,   range of motion normal   Lungs:     Clear to auscultation,respirations regular, even and              unlabored    Heart:    Regular rhythm and normal rate, normal S1 and S2, no         murmur, no gallop, no rub, no click   Breast Exam:    Deferred   Abdomen:     Normal bowel sounds, no masses, no organomegaly, soft  nontender, nondistended, no guarding, no rebound                 tenderness   Genitalia:    Deferred   Extremities:   Moves all extremities well, no edema, no cyanosis, no redness   Pulses:   Pulses palpable and equal bilaterally   Skin:   No bleeding, bruising or rash   Lymph nodes:   No palpable adenopathy   Neurologic:   Cranial nerves 2 - 12 grossly intact, sensation intact, DTR    present and equal bilaterally      Results Review:     Lab Results (last 24 hours)     Procedure Component Value Units Date/Time    Pregnancy, Urine - Urine, Clean Catch [[id number]]  (Normal) Collected: 09/07/22 1532    Specimen: Urine, Clean Catch Updated: 09/07/22 1546     HCG, Urine QL Negative    COVID-19 and FLU A/B PCR - Swab, Nasopharynx [[id number]]  (Normal) Collected: 09/06/22 1830    Specimen: Swab from Nasopharynx Updated: 09/06/22 1929     COVID19 Not Detected     Influenza A PCR Not Detected     Influenza B PCR Not Detected    Narrative:      Fact sheet for providers: https://www.fda.gov/media/193879/download    Fact sheet for patients: https://www.fda.gov/media/647088/download    Test performed by PCR.           I reviewed the patient's new clinical results.  I reviewed the patient's new imaging results and agree with the interpretation.     ASSESSMENT/PLAN:   ASSESSMENT:  1.  Common bile duct stone, retained after cholecystectomy    PLAN:  1.  Endoscopic retrograde cholangiogram with endoscopic sphincterotomy with removal of common bile duct stone and placement of straight plastic biliary stent    Risk and benefits associated with the procedure  are reviewed with the patient.  The patient wished to proceed     Tyrone Louise,   09/07/22  16:06 CDT

## 2022-09-07 NOTE — PROGRESS NOTES
GENERAL SURGERY PROGRESS NOTE     LOS: 0 days       Interval History:   No acute events overnight.  Patient has minimal abdominal pain.      Medication Review:        sodium chloride, 125 mL/hr, Last Rate: 125 mL/hr (09/07/22 0824)    Objective     Vital Signs:  Temp:  [97.1 °F (36.2 °C)-97.9 °F (36.6 °C)] 97.9 °F (36.6 °C)  Heart Rate:  [60-74] 74  Resp:  [18] 18  BP: (139-169)/(74-94) 169/94    Intake/Output Summary (Last 24 hours) at 9/7/2022 0935  Last data filed at 9/7/2022 0824  Gross per 24 hour   Intake 3568 ml   Output --   Net 3568 ml       Physical Exam  Constitutional:       Appearance: Normal appearance.   HENT:      Head: Normocephalic and atraumatic.   Cardiovascular:      Rate and Rhythm: Normal rate and regular rhythm.   Pulmonary:      Effort: Pulmonary effort is normal. No respiratory distress.   Abdominal:      General: There is no distension.      Palpations: Abdomen is soft.      Comments: Incisions are clean/dry/intact   Neurological:      General: No focal deficit present.      Mental Status: She is alert and oriented to person, place, and time.         Results Review:    Results from last 7 days   Lab Units 09/06/22 0523 09/05/22  0735 09/04/22  2121   SODIUM mmol/L 141 140 141   POTASSIUM mmol/L 3.5 3.6 3.5   CHLORIDE mmol/L 105 106 106   CO2 mmol/L 27.0 26.0 27.0   BUN mg/dL 6 12 13   CREATININE mg/dL 0.71 0.70 0.77   GLUCOSE mg/dL 115* 95 111*   CALCIUM mg/dL 8.8 8.5* 9.0     Results from last 7 days   Lab Units 09/05/22  0735 09/04/22  2121   WBC 10*3/mm3 6.96 8.44   HEMOGLOBIN g/dL 11.1* 12.1   HEMATOCRIT % 34.1 35.8   PLATELETS 10*3/mm3 237 270       Assessment:    Calculus of gallbladder and bile duct with obstruction without cholecystitis        Plan:    Postop day 2 from laparoscopic cholecystectomy with failed intraoperative cholangiogram.  ERCP today with Dr. Louise        This document has been electronically signed by Jeffry Witt MD on September 7, 2022 09:35 CDT

## 2022-09-07 NOTE — PROGRESS NOTES
GENERAL SURGERY PROGRESS NOTE     LOS: 0 days       Interval History:     No acute events overnight.  Patient tolerating clear liquids.  Her abdominal pain has significantly improved.    Medication Review:        sodium chloride, 125 mL/hr, Last Rate: 125 mL/hr (09/07/22 0824)    Objective     Vital Signs:  Temp:  [97.1 °F (36.2 °C)-97.9 °F (36.6 °C)] 97.9 °F (36.6 °C)  Heart Rate:  [60-74] 74  Resp:  [18] 18  BP: (139-169)/(74-94) 169/94    Intake/Output Summary (Last 24 hours) at 9/7/2022 0936  Last data filed at 9/7/2022 0824  Gross per 24 hour   Intake 3568 ml   Output --   Net 3568 ml       Physical Exam  Constitutional:       Appearance: Normal appearance.   HENT:      Head: Normocephalic and atraumatic.   Eyes:      Extraocular Movements: Extraocular movements intact.      Pupils: Pupils are equal, round, and reactive to light.   Cardiovascular:      Rate and Rhythm: Normal rate and regular rhythm.   Pulmonary:      Effort: Pulmonary effort is normal. No respiratory distress.   Abdominal:      General: There is no distension.      Palpations: Abdomen is soft.      Comments: Incisions are clean/dry/intact   Neurological:      General: No focal deficit present.      Mental Status: She is alert and oriented to person, place, and time.         Results Review:    Results from last 7 days   Lab Units 09/06/22 0523 09/05/22  0735 09/04/22 2121   SODIUM mmol/L 141 140 141   POTASSIUM mmol/L 3.5 3.6 3.5   CHLORIDE mmol/L 105 106 106   CO2 mmol/L 27.0 26.0 27.0   BUN mg/dL 6 12 13   CREATININE mg/dL 0.71 0.70 0.77   GLUCOSE mg/dL 115* 95 111*   CALCIUM mg/dL 8.8 8.5* 9.0     Results from last 7 days   Lab Units 09/05/22  0735 09/04/22  2121   WBC 10*3/mm3 6.96 8.44   HEMOGLOBIN g/dL 11.1* 12.1   HEMATOCRIT % 34.1 35.8   PLATELETS 10*3/mm3 237 270       Assessment:    Calculus of gallbladder and bile duct with obstruction without cholecystitis        Plan:    Bilirubin elevated today.  Consult GI for ERCP for retained  stone        This document has been electronically signed by Jeffry Witt MD on September 7, 2022 09:36 CDT

## 2022-09-07 NOTE — ANESTHESIA PREPROCEDURE EVALUATION
Anesthesia Evaluation     Patient summary reviewed and Nursing notes reviewed   NPO Solid Status: > 8 hours  NPO Liquid Status: > 2 hours           Airway   Mallampati: III  TM distance: >3 FB  Neck ROM: full  Possible difficult intubation  Dental - normal exam     Pulmonary - negative pulmonary ROS and normal exam   Cardiovascular - normal exam  Exercise tolerance: excellent (>7 METS)        Neuro/Psych  (+) psychiatric history Anxiety,    GI/Hepatic/Renal/Endo    (+) obesity, morbid obesity,      Musculoskeletal     Abdominal   (+) obese,    Substance History - negative use     OB/GYN negative ob/gyn ROS         Other                        Anesthesia Plan    ASA 3     general     intravenous induction     Anesthetic plan, risks, benefits, and alternatives have been provided, discussed and informed consent has been obtained with: patient.        CODE STATUS:    Code Status (Patient has no pulse and is not breathing): CPR (Attempt to Resuscitate)  Medical Interventions (Patient has pulse or is breathing): Full Support  Release to patient: Routine Release

## 2022-09-08 VITALS
RESPIRATION RATE: 18 BRPM | TEMPERATURE: 97.8 F | DIASTOLIC BLOOD PRESSURE: 88 MMHG | WEIGHT: 293 LBS | HEART RATE: 61 BPM | SYSTOLIC BLOOD PRESSURE: 152 MMHG | HEIGHT: 64 IN | BODY MASS INDEX: 50.02 KG/M2 | OXYGEN SATURATION: 99 %

## 2022-09-08 PROBLEM — K80.71 CALCULUS OF GALLBLADDER AND BILE DUCT WITH OBSTRUCTION WITHOUT CHOLECYSTITIS: Status: RESOLVED | Noted: 2022-09-05 | Resolved: 2022-09-08

## 2022-09-08 LAB
ALBUMIN SERPL-MCNC: 3.3 G/DL (ref 3.5–5.2)
ALBUMIN/GLOB SERPL: 0.9 G/DL
ALP SERPL-CCNC: 242 U/L (ref 39–117)
ALT SERPL W P-5'-P-CCNC: 49 U/L (ref 1–33)
ANION GAP SERPL CALCULATED.3IONS-SCNC: 9 MMOL/L (ref 5–15)
AST SERPL-CCNC: 26 U/L (ref 1–32)
BILIRUB SERPL-MCNC: 0.7 MG/DL (ref 0–1.2)
BUN SERPL-MCNC: 4 MG/DL (ref 6–20)
BUN/CREAT SERPL: 7 (ref 7–25)
CALCIUM SPEC-SCNC: 8.2 MG/DL (ref 8.6–10.5)
CHLORIDE SERPL-SCNC: 107 MMOL/L (ref 98–107)
CO2 SERPL-SCNC: 25 MMOL/L (ref 22–29)
CREAT SERPL-MCNC: 0.57 MG/DL (ref 0.57–1)
EGFRCR SERPLBLD CKD-EPI 2021: 129.5 ML/MIN/1.73
GLOBULIN UR ELPH-MCNC: 3.5 GM/DL
GLUCOSE SERPL-MCNC: 79 MG/DL (ref 65–99)
LIPASE SERPL-CCNC: 76 U/L (ref 13–60)
POTASSIUM SERPL-SCNC: 3.6 MMOL/L (ref 3.5–5.2)
PROT SERPL-MCNC: 6.8 G/DL (ref 6–8.5)
SODIUM SERPL-SCNC: 141 MMOL/L (ref 136–145)

## 2022-09-08 PROCEDURE — 83690 ASSAY OF LIPASE: CPT | Performed by: INTERNAL MEDICINE

## 2022-09-08 PROCEDURE — 99024 POSTOP FOLLOW-UP VISIT: CPT | Performed by: SURGERY

## 2022-09-08 PROCEDURE — 74300 X-RAY BILE DUCTS/PANCREAS: CPT | Performed by: SURGERY

## 2022-09-08 PROCEDURE — G0378 HOSPITAL OBSERVATION PER HR: HCPCS

## 2022-09-08 PROCEDURE — 80053 COMPREHEN METABOLIC PANEL: CPT | Performed by: INTERNAL MEDICINE

## 2022-09-08 RX ORDER — HYDROCODONE BITARTRATE AND ACETAMINOPHEN 5; 325 MG/1; MG/1
1 TABLET ORAL EVERY 4 HOURS PRN
Qty: 15 TABLET | Refills: 0 | Status: SHIPPED | OUTPATIENT
Start: 2022-09-08 | End: 2022-09-12

## 2022-09-08 RX ADMIN — SODIUM CHLORIDE 125 ML/HR: 9 INJECTION, SOLUTION INTRAVENOUS at 03:03

## 2022-09-08 RX ADMIN — HYDROCODONE BITARTRATE AND ACETAMINOPHEN 1 TABLET: 5; 325 TABLET ORAL at 08:38

## 2022-09-08 NOTE — DISCHARGE SUMMARY
Discharge Summary    Date of Admission: 9/4/2022  Date of Discharge:  9/8/2022  Service: General Surgery  Attending: Jeffry Witt    Procedures Performed: Procedure(s):  ENDOSCOPIC RETROGRADE CHOLANGIOPANCREATOGRAPHY WITH STENT PLACEMENT  Consults:   Consults     Date and Time Order Name Status Description    9/6/2022  6:58 AM Inpatient Gastroenterology Consult Completed     9/5/2022 12:13 AM Surgery (on-call MD unless specified)          Admitting Diagnoses: Calculus of gallbladder and bile duct with obstruction without cholecystitis [K80.71]   Discharge Diagnoses: There are no active hospital problems to display for this patient.      Hospital Course: 27-year-old female who presented with acute cholecystitis and elevated bilirubin.  She underwent a laparoscopic cholecystectomy with intraoperative cholangiogram.  Her IOC was unable to completely visualize her ducts.  Postoperatively, she had an elevated bilirubin and GI was consulted for an ERCP.  She underwent ERCP, sphincterotomy, stone retrieval, and stent placement.  At the time of discharge she was tolerating regular diet, ambulating without assistance, and her pain was well controlled on p.o. pain medication.    Discharge Condition: Postoperatively Stable    Discharge Medications:     Your medication list      START taking these medications      Instructions Last Dose Given Next Dose Due   HYDROcodone-acetaminophen 5-325 MG per tablet  Commonly known as: NORCO      Take 1 tablet by mouth Every 4 (Four) Hours As Needed for Moderate Pain or Severe Pain for up to 4 days.          CONTINUE taking these medications      Instructions Last Dose Given Next Dose Due   acetaminophen 325 MG tablet  Commonly known as: Tylenol      Take 2 tablets by mouth Every 4 (Four) Hours As Needed for Mild Pain  or Moderate Pain .       cetirizine 5 MG tablet  Commonly known as: zyrTEC      Take 1 tablet by mouth Daily.       ibuprofen 800 MG tablet  Commonly known as: ADVIL,MOTRIN       Take 1 tablet by mouth Every 8 (Eight) Hours As Needed for Mild Pain  or Moderate Pain .       norethindrone-ethinyl estradiol FE 1-20 MG-MCG per tablet  Commonly known as: Junel FE 1/20      Take 1 tablet by mouth Daily.       sertraline 50 MG tablet  Commonly known as: ZOLOFT      TAKE 1 TABLET BY MOUTH DAILY             Where to Get Your Medications      These medications were sent to [id number] - [place], [place] - [address] [facility] [address] [facility] - [phone]  - [phone] [facility] [facility] [address]    Phone: [phone]   · HYDROcodone-acetaminophen 5-325 MG per tablet               Regular Diet    Physical Activity: No restrictions       Follow-up Appointments  Your Scheduled Appointments    Mar 13, 2023 10:00 AM  Annual with Sho Louise MD  [facility] (--) [address] [place] [address] [address]  [facility] [address] [place] [address] [phone]                   This document has been electronically signed by Jeffry Witt MD on September 8, 2022 09:51 CDT

## 2022-09-08 NOTE — PROGRESS NOTES
Corbin Lowe DO,[facility]  Gastroenterology  Hepatology  Endoscopy  Board Certified in Internal Medicine and gastroenterology  [address] [facility], [address]  [place]. [address]  - [phone]   F - [phone]     GASTROENTEROLOGY PROGRESS NOTE   CORBIN LOWE DO.         SUBJECTIVE:   9/8/2022  Chief Complaint:     Subjective  : No pain.  Able to tolerate regular diet.  Does not like the diet.  No vomiting.  Lab test appropriate with decompression of bile duct.  No evidence of pancreatitis       CURRENT MEDICATIONS/OBJECTIVE/VS/PE:     Current Medications:     Current Facility-Administered Medications   Medication Dose Route Frequency Provider Last Rate Last Admin   • HYDROcodone-acetaminophen (NORCO) 5-325 MG per tablet 1 tablet  1 tablet Oral Q4H PRN Corbin Lowe DO   1 tablet at 09/08/22 0838   • ondansetron ODT (ZOFRAN-ODT) disintegrating tablet 4 mg  4 mg Oral Q4H PRN Corbin Lowe DO   4 mg at 09/07/22 2039   • sodium chloride 0.9 % flush 10 mL  10 mL Intravenous PRN Corbin Lowe DO   10 mL at 09/05/22 1959   • sodium chloride 0.9 % infusion  125 mL/hr Intravenous Continuous Corbin Lowe  mL/hr at 09/08/22 0303 125 mL/hr at 09/08/22 0303       Objective     Physical Exam:   Temp:  [96.8 °F (36 °C)-97.8 °F (36.6 °C)] 97.8 °F (36.6 °C)  Heart Rate:  [61-87] 61  Resp:  [18-20] 18  BP: (132-183)/(74-90) 152/88     Physical Exam:  General Appearance:    Alert, cooperative, in no acute distress   Head:    Normocephalic, without obvious abnormality, atraumatic   Eyes:            Lids and lashes normal, conjunctivae and sclerae normal, no icterus, no pallor, corneas clear, PERRLA   Ears:    Ears appear intact with no abnormalities noted   Throat:   No oral lesions, no thrush, oral mucosa moist   Neck:   No adenopathy, supple, trachea midline, no thyromegaly, no  carotid bruit, no JVD   Back:     No kyphosis present, no scoliosis present, no skin lesions,   erythema or  scars, no tenderness to percussion or  palpation,   range of motion normal   Lungs:     Clear to auscultation,respirations regular, even and              unlabored    Heart:    Regular rhythm and normal rate, normal S1 and S2, no         murmur, no gallop, no rub, no click   Breast Exam:    Deferred   Abdomen:    Surgical wounds clean   Genitalia:    Deferred   Extremities:   Moves all extremities well, no edema, no cyanosis, no redness   Pulses:   Pulses palpable and equal bilaterally   Skin:   No bleeding, bruising or rash   Lymph nodes:   No palpable adenopathy   Neurologic:   Cranial nerves 2 - 12 grossly intact, sensation intact, DTR    present and equal bilaterally      Results Review:     Lab Results (last 24 hours)     Procedure Component Value Units Date/Time    Comprehensive Metabolic Panel [[id number]]  (Abnormal) Collected: 09/08/22 0551    Specimen: Blood Updated: 09/08/22 0651     Glucose 79 mg/dL      BUN 4 mg/dL      Creatinine 0.57 mg/dL      Sodium 141 mmol/L      Potassium 3.6 mmol/L      Chloride 107 mmol/L      CO2 25.0 mmol/L      Calcium 8.2 mg/dL      Total Protein 6.8 g/dL      Albumin 3.30 g/dL      ALT (SGPT) 49 U/L      AST (SGOT) 26 U/L      Alkaline Phosphatase 242 U/L      Total Bilirubin 0.7 mg/dL      Globulin 3.5 gm/dL      A/G Ratio 0.9 g/dL      BUN/Creatinine Ratio 7.0     Anion Gap 9.0 mmol/L      eGFR 129.5 mL/min/1.73      Comment: National Kidney Foundation and American Society of Nephrology (ASN) Task Force recommended calculation based on the Chronic Kidney Disease Epidemiology Collaboration (CKD-EPI) equation refit without adjustment for race.       Narrative:      GFR Normal >60  Chronic Kidney Disease <60  Kidney Failure <15      Lipase [[id number]]  (Abnormal) Collected: 09/08/22 0551    Specimen: Blood Updated: 09/08/22 0651     Lipase 76 U/L     Pregnancy, Urine - Urine, Clean Catch [[id number]]  (Normal) Collected: 09/07/22 1532    Specimen: Urine, Clean Catch Updated:  09/07/22 1546     HCG, Urine QL Negative           I reviewed the patient's new clinical results.  I reviewed the patient's new imaging results and agree with the interpretation.     ASSESSMENT/PLAN:   ASSESSMENT:  1.  Common bile duct stone, status post endoscopic removal with stent placement    PLAN:  1.  Okay for discharge from my perspective  2.  Follow-up with me in 1 month.  Repeat ERCP with stent extraction in 2 months     Tyrone Louise DO  09/08/22  14:05 CDT

## 2022-09-08 NOTE — PROGRESS NOTES
GENERAL SURGERY PROGRESS NOTE     LOS: 0 days       Interval History:     ERCP done yesterday with removal of right hepatic duct stone, sphincterotomy, and stent placement.  Patient complains of mild abdominal pain this morning.  Tolerating clear liquids    Medication Review:        sodium chloride, 125 mL/hr, Last Rate: 125 mL/hr (09/08/22 0303)    Objective     Vital Signs:  Temp:  [96.8 °F (36 °C)-97.7 °F (36.5 °C)] 97.7 °F (36.5 °C)  Heart Rate:  [63-87] 74  Resp:  [18-20] 18  BP: (132-183)/(74-90) 144/78    Intake/Output Summary (Last 24 hours) at 9/8/2022 0843  Last data filed at 9/8/2022 0303  Gross per 24 hour   Intake 1930.83 ml   Output --   Net 1930.83 ml       Physical Exam  HENT:      Head: Normocephalic and atraumatic.   Cardiovascular:      Rate and Rhythm: Normal rate.   Pulmonary:      Effort: Pulmonary effort is normal. No respiratory distress.   Abdominal:      General: There is no distension.      Palpations: Abdomen is soft.      Tenderness: There is no abdominal tenderness.      Comments: Incisions are clean/dry/intact   Neurological:      Mental Status: She is alert.         Results Review:    Results from last 7 days   Lab Units 09/08/22  0551 09/06/22  0523 09/05/22  0735   SODIUM mmol/L 141 141 140   POTASSIUM mmol/L 3.6 3.5 3.6   CHLORIDE mmol/L 107 105 106   CO2 mmol/L 25.0 27.0 26.0   BUN mg/dL 4* 6 12   CREATININE mg/dL 0.57 0.71 0.70   GLUCOSE mg/dL 79 115* 95   CALCIUM mg/dL 8.2* 8.8 8.5*     Results from last 7 days   Lab Units 09/05/22  0735 09/04/22  2121   WBC 10*3/mm3 6.96 8.44   HEMOGLOBIN g/dL 11.1* 12.1   HEMATOCRIT % 34.1 35.8   PLATELETS 10*3/mm3 237 270       Assessment:    Calculus of gallbladder and bile duct with obstruction without cholecystitis  Choledocholithiasis        Plan:    Postop day 3 from laparoscopic cholecystectomy and postop day 1 from ERCP.  Advance diet today.  If the patient tolerates she can be discharged home.        This document has been  electronically signed by Jeffry Witt MD on September 8, 2022 08:43 CDT

## 2022-09-08 NOTE — PLAN OF CARE
Goal Outcome Evaluation:           Progress: improving Pt. VSS. Pt. Achieved adequate po pain control. Pt. Requested prn med x 1 this shift

## 2022-09-09 LAB — REF LAB TEST METHOD: NORMAL

## 2022-09-19 ENCOUNTER — OFFICE VISIT (OUTPATIENT)
Dept: SURGERY | Facility: CLINIC | Age: 27
End: 2022-09-19

## 2022-09-19 VITALS
SYSTOLIC BLOOD PRESSURE: 120 MMHG | OXYGEN SATURATION: 99 % | HEIGHT: 64 IN | BODY MASS INDEX: 50.02 KG/M2 | WEIGHT: 293 LBS | DIASTOLIC BLOOD PRESSURE: 84 MMHG | HEART RATE: 91 BPM | TEMPERATURE: 96.4 F

## 2022-09-19 DIAGNOSIS — Z48.815 ENCOUNTER FOR SURGICAL AFTERCARE FOLLOWING SURGERY OF DIGESTIVE SYSTEM: Primary | ICD-10-CM

## 2022-09-19 PROCEDURE — 99024 POSTOP FOLLOW-UP VISIT: CPT | Performed by: SURGERY

## 2022-09-19 NOTE — PROGRESS NOTES
Chief Complaint   Patient presents with   • Post-op     22 lap dagoberto        27-year-old female 2 weeks postop from laparoscopic cholecystectomy and subsequent ERCP for choledocholithiasis.  She has no complaints at this time.      Past Medical History:   Diagnosis Date   • Anxiety        Past Surgical History:   Procedure Laterality Date   •  SECTION N/A 2022    Procedure:  SECTION PRIMARY;  Surgeon: Sho Louise MD;  Location: [facility] LABOR DELIVERY;  Service: Obstetrics;  Laterality: N/A;   • CHOLECYSTECTOMY WITH INTRAOPERATIVE CHOLANGIOGRAM N/A 2022    Procedure: CHOLECYSTECTOMY LAPAROSCOPIC INTRAOPERATIVE CHOLANGIOGRAM;  Surgeon: Jeffry Witt MD;  Location: [facility] OR;  Service: General;  Laterality: N/A;   • FOOT FRACTURE SURGERY     • KNEE CARTILAGE SURGERY           Current Outpatient Medications:   •  acetaminophen (Tylenol) 325 MG tablet, Take 2 tablets by mouth Every 4 (Four) Hours As Needed for Mild Pain  or Moderate Pain ., Disp: 100 tablet, Rfl: 2  •  cetirizine (zyrTEC) 5 MG tablet, Take 1 tablet by mouth Daily., Disp: 30 tablet, Rfl: 2  •  ibuprofen (ADVIL,MOTRIN) 800 MG tablet, Take 1 tablet by mouth Every 8 (Eight) Hours As Needed for Mild Pain  or Moderate Pain ., Disp: 40 tablet, Rfl: 1  •  norethindrone-ethinyl estradiol FE (Junel FE ) 1-20 MG-MCG per tablet, Take 1 tablet by mouth Daily., Disp: 28 tablet, Rfl: 12  •  sertraline (ZOLOFT) 50 MG tablet, TAKE 1 TABLET BY MOUTH DAILY, Disp: 30 tablet, Rfl: 5    No Known Allergies    Family History   Problem Relation Age of Onset   • Hypertension Mother    • Hypertension Maternal Grandmother    • Diabetes Maternal Grandmother    • Depression Maternal Grandmother        Social History     Socioeconomic History   • Marital status: Single   Tobacco Use   • Smoking status: Never Smoker   • Smokeless tobacco: Never Used   Substance and Sexual Activity   • Alcohol use: Not Currently      Alcohol/week: 0.0 standard drinks   • Drug use: Never   • Sexual activity: Yes     Partners: Male     Birth control/protection: None     Comment: last pap smear 21 negative at [place]            Physical Exam  Constitutional:       Appearance: Normal appearance. She is obese.   HENT:      Head: Normocephalic and atraumatic.   Pulmonary:      Effort: Pulmonary effort is normal. No respiratory distress.   Abdominal:      General: There is no distension.      Palpations: Abdomen is soft.      Tenderness: There is no abdominal tenderness.      Comments: Well-healing incisions   Neurological:      General: No focal deficit present.      Mental Status: She is alert and oriented to person, place, and time.         [facility]   [address] [place] [address]   Phone: [phone] or [phone]   Fax: [phone]   Frank Davis M.D., Medical Director     PATIENT NAME                           LABORATORY NO.   [address]                [id number]   [id number]                         AGE              SEX  N           CLIENT REF #   [facility]           27      1995  F    [id number]   [id number]     [place]                       REQUESTING M.D.     ATTENDING M.D.     COPY TO.   [address] [place] [address]             DATE COLLECTED      DATE RECEIVED      DATE REPORTED   2022     DIAGNOSIS:   GALLBLADDER:   Chronic cholecystitis   Cholelithiasis     JBS/pah     CLINICAL HISTORY:   Calculus of gallbladder and bile duct with obstruction without cholecystitis     SPECIMENS RECEIVED:   GALLBLADDER     MICROSCOPIC DESCRIPTION:   Tissue blocks are prepared and slides are examined microscopically on all   specimens. See diagnosis for details.     Professional interpretation rendered by Hugo Houser M.D. at [facility],   [employer], [address] [place] [address]  [facility] , [place] [address].     GROSS DESCRIPTION:   Received in formalin labeled gallbladder is an 8.5 x 2.5 x 2.2 cm intact,   unopened gallbladder with clamped cystic duct and green, smooth glistening   serosa.  The lumen contains an abundant amount of dark green viscous bile and   approximately 25 yellow bosselated choleliths averaging 0.4 cm in greatest   dimension.  The mucosa is green and velvety with yellow stippling and the wall   thickness averages 0.2 cm.  No masses or mucosal lesions are present.   Representative sections to include the en face cystic duct margin, neck, body and   fundus are submitted in a single cassette.  HDM     REVIEWED, DIAGNOSED AND ELECTRONICALLY   SIGNED BY:     Hugo Houser M.D.   ASSESSMENT    Diagnoses and all orders for this visit:    1. Encounter for surgical aftercare following surgery of digestive system (Primary)        PLAN    1.  Patient doing well from her laparoscopic cholecystectomy.  I counseled her that she needs to follow-up with GI for her stent removal.  She can follow-up here as needed.              This document has been electronically signed by Jeffry Witt MD on September 19, 2022 09:41 CDT

## 2022-10-20 ENCOUNTER — PREP FOR SURGERY (OUTPATIENT)
Dept: OTHER | Facility: HOSPITAL | Age: 27
End: 2022-10-20

## 2022-10-20 DIAGNOSIS — T18.4XXA FOREIGN BODY IN INTESTINE AND COLON: Primary | ICD-10-CM

## 2022-10-20 DIAGNOSIS — T18.3XXA FOREIGN BODY IN INTESTINE AND COLON: Primary | ICD-10-CM

## 2022-10-20 RX ORDER — DEXTROSE AND SODIUM CHLORIDE 5; .45 G/100ML; G/100ML
30 INJECTION, SOLUTION INTRAVENOUS CONTINUOUS PRN
Status: CANCELLED | OUTPATIENT
Start: 2022-11-15

## 2022-10-21 PROBLEM — T18.4XXA FOREIGN BODY IN INTESTINE AND COLON: Status: ACTIVE | Noted: 2022-10-21

## 2022-10-21 PROBLEM — T18.3XXA FOREIGN BODY IN INTESTINE AND COLON: Status: ACTIVE | Noted: 2022-10-21

## 2022-11-15 ENCOUNTER — ANESTHESIA EVENT (OUTPATIENT)
Dept: GASTROENTEROLOGY | Facility: HOSPITAL | Age: 27
End: 2022-11-15

## 2022-11-15 ENCOUNTER — APPOINTMENT (OUTPATIENT)
Dept: GENERAL RADIOLOGY | Facility: HOSPITAL | Age: 27
End: 2022-11-15

## 2022-11-15 ENCOUNTER — HOSPITAL ENCOUNTER (OUTPATIENT)
Facility: HOSPITAL | Age: 27
Setting detail: HOSPITAL OUTPATIENT SURGERY
Discharge: HOME | End: 2022-11-15
Attending: INTERNAL MEDICINE | Admitting: INTERNAL MEDICINE

## 2022-11-15 ENCOUNTER — ANESTHESIA (OUTPATIENT)
Dept: GASTROENTEROLOGY | Facility: HOSPITAL | Age: 27
End: 2022-11-15

## 2022-11-15 VITALS
WEIGHT: 293 LBS | OXYGEN SATURATION: 100 % | BODY MASS INDEX: 50.02 KG/M2 | TEMPERATURE: 98.3 F | DIASTOLIC BLOOD PRESSURE: 82 MMHG | RESPIRATION RATE: 20 BRPM | HEART RATE: 84 BPM | HEIGHT: 64 IN | SYSTOLIC BLOOD PRESSURE: 154 MMHG

## 2022-11-15 DIAGNOSIS — T18.4XXA FOREIGN BODY IN INTESTINE AND COLON: ICD-10-CM

## 2022-11-15 DIAGNOSIS — T18.3XXA FOREIGN BODY IN INTESTINE AND COLON: ICD-10-CM

## 2022-11-15 LAB — B-HCG UR QL: NEGATIVE

## 2022-11-15 PROCEDURE — 74328 X-RAY BILE DUCT ENDOSCOPY: CPT

## 2022-11-15 PROCEDURE — 25010000002 PROPOFOL 10 MG/ML EMULSION

## 2022-11-15 PROCEDURE — 25010000002 IOPAMIDOL 61 % SOLUTION: Performed by: INTERNAL MEDICINE

## 2022-11-15 PROCEDURE — 81025 URINE PREGNANCY TEST: CPT | Performed by: INTERNAL MEDICINE

## 2022-11-15 PROCEDURE — 25010000002 FENTANYL CITRATE (PF) 50 MCG/ML SOLUTION

## 2022-11-15 PROCEDURE — 25010000002 ONDANSETRON PER 1 MG

## 2022-11-15 PROCEDURE — 88300 SURGICAL PATH GROSS: CPT

## 2022-11-15 PROCEDURE — 25010000002 MIDAZOLAM PER 1 MG

## 2022-11-15 RX ORDER — PROMETHAZINE HYDROCHLORIDE 25 MG/1
25 TABLET ORAL ONCE AS NEEDED
Status: DISCONTINUED | OUTPATIENT
Start: 2022-11-15 | End: 2022-11-15 | Stop reason: HOSPADM

## 2022-11-15 RX ORDER — DIPHENHYDRAMINE HYDROCHLORIDE 50 MG/ML
12.5 INJECTION INTRAMUSCULAR; INTRAVENOUS
Status: DISCONTINUED | OUTPATIENT
Start: 2022-11-15 | End: 2022-11-15 | Stop reason: HOSPADM

## 2022-11-15 RX ORDER — ACETAMINOPHEN 325 MG/1
650 TABLET ORAL ONCE AS NEEDED
Status: DISCONTINUED | OUTPATIENT
Start: 2022-11-15 | End: 2022-11-15 | Stop reason: HOSPADM

## 2022-11-15 RX ORDER — PROMETHAZINE HYDROCHLORIDE 25 MG/1
25 SUPPOSITORY RECTAL ONCE AS NEEDED
Status: DISCONTINUED | OUTPATIENT
Start: 2022-11-15 | End: 2022-11-15 | Stop reason: HOSPADM

## 2022-11-15 RX ORDER — NALOXONE HCL 0.4 MG/ML
0.4 VIAL (ML) INJECTION AS NEEDED
Status: DISCONTINUED | OUTPATIENT
Start: 2022-11-15 | End: 2022-11-15 | Stop reason: HOSPADM

## 2022-11-15 RX ORDER — FLUMAZENIL 0.1 MG/ML
0.2 INJECTION INTRAVENOUS AS NEEDED
Status: DISCONTINUED | OUTPATIENT
Start: 2022-11-15 | End: 2022-11-15 | Stop reason: HOSPADM

## 2022-11-15 RX ORDER — ONDANSETRON 2 MG/ML
4 INJECTION INTRAMUSCULAR; INTRAVENOUS ONCE AS NEEDED
Status: COMPLETED | OUTPATIENT
Start: 2022-11-15 | End: 2022-11-15

## 2022-11-15 RX ORDER — DEXTROSE AND SODIUM CHLORIDE 5; .45 G/100ML; G/100ML
30 INJECTION, SOLUTION INTRAVENOUS CONTINUOUS PRN
Status: DISCONTINUED | OUTPATIENT
Start: 2022-11-15 | End: 2022-11-15 | Stop reason: HOSPADM

## 2022-11-15 RX ORDER — MIDAZOLAM HYDROCHLORIDE 1 MG/ML
INJECTION INTRAMUSCULAR; INTRAVENOUS AS NEEDED
Status: DISCONTINUED | OUTPATIENT
Start: 2022-11-15 | End: 2022-11-15 | Stop reason: SURG

## 2022-11-15 RX ORDER — PROPOFOL 10 MG/ML
VIAL (ML) INTRAVENOUS AS NEEDED
Status: DISCONTINUED | OUTPATIENT
Start: 2022-11-15 | End: 2022-11-15 | Stop reason: SURG

## 2022-11-15 RX ORDER — ACETAMINOPHEN 650 MG/1
650 SUPPOSITORY RECTAL ONCE AS NEEDED
Status: DISCONTINUED | OUTPATIENT
Start: 2022-11-15 | End: 2022-11-15 | Stop reason: HOSPADM

## 2022-11-15 RX ORDER — EPHEDRINE SULFATE 50 MG/ML
5 INJECTION, SOLUTION INTRAVENOUS ONCE AS NEEDED
Status: DISCONTINUED | OUTPATIENT
Start: 2022-11-15 | End: 2022-11-15 | Stop reason: HOSPADM

## 2022-11-15 RX ORDER — FENTANYL CITRATE 50 UG/ML
INJECTION, SOLUTION INTRAMUSCULAR; INTRAVENOUS AS NEEDED
Status: DISCONTINUED | OUTPATIENT
Start: 2022-11-15 | End: 2022-11-15 | Stop reason: SURG

## 2022-11-15 RX ORDER — LIDOCAINE HYDROCHLORIDE 20 MG/ML
INJECTION, SOLUTION INTRAVENOUS AS NEEDED
Status: DISCONTINUED | OUTPATIENT
Start: 2022-11-15 | End: 2022-11-15 | Stop reason: SURG

## 2022-11-15 RX ADMIN — PROPOFOL 150 MG: 10 INJECTION, EMULSION INTRAVENOUS at 11:31

## 2022-11-15 RX ADMIN — PROPOFOL 120 MG: 10 INJECTION, EMULSION INTRAVENOUS at 11:32

## 2022-11-15 RX ADMIN — FENTANYL CITRATE 100 MCG: 50 INJECTION INTRAMUSCULAR; INTRAVENOUS at 11:23

## 2022-11-15 RX ADMIN — IOPAMIDOL 10 ML: 612 INJECTION, SOLUTION INTRAVENOUS at 11:43

## 2022-11-15 RX ADMIN — ONDANSETRON 4 MG: 2 INJECTION INTRAMUSCULAR; INTRAVENOUS at 11:31

## 2022-11-15 RX ADMIN — MIDAZOLAM HYDROCHLORIDE 2 MG: 1 INJECTION, SOLUTION INTRAMUSCULAR; INTRAVENOUS at 11:23

## 2022-11-15 RX ADMIN — DEXTROSE AND SODIUM CHLORIDE 30 ML/HR: 5; 450 INJECTION, SOLUTION INTRAVENOUS at 10:26

## 2022-11-15 RX ADMIN — LIDOCAINE HYDROCHLORIDE 100 MG: 20 INJECTION, SOLUTION INTRAVENOUS at 11:31

## 2022-11-15 NOTE — ANESTHESIA PREPROCEDURE EVALUATION
Anesthesia Evaluation     Patient summary reviewed and Nursing notes reviewed   NPO Solid Status: > 8 hours  NPO Liquid Status: > 2 hours           Airway   Mallampati: III  TM distance: >3 FB  Neck ROM: full  Possible difficult intubation  Dental - normal exam     Pulmonary - negative pulmonary ROS and normal exam   Cardiovascular - normal exam  Exercise tolerance: excellent (>7 METS)        Neuro/Psych  (+) psychiatric history Anxiety,    GI/Hepatic/Renal/Endo    (+) obesity, morbid obesity,      Musculoskeletal     Abdominal   (+) obese,    Substance History - negative use     OB/GYN negative ob/gyn ROS         Other                          Anesthesia Plan    ASA 3     general     intravenous induction     Anesthetic plan, risks, benefits, and alternatives have been provided, discussed and informed consent has been obtained with: patient.        CODE STATUS:

## 2022-11-15 NOTE — H&P
Tyrone Lowe DO,[facility]  Gastroenterology  Hepatology  Endoscopy  Board Certified in Internal Medicine and gastroenterology  [address] [facility], [address]  [place]. [address]  T- [phone]   F - [phone]     GASTROENTEROLOGY HISTORY AND PHYSICAL  NOTE   TYRONE LOWE DO.         SUBJECTIVE:   11/15/2022    Name: Denise Rod  DOD: 1995        Chief Complaint:     Subjective : Common bile duct stone status post endoscopic sphincterotomy with placement of straight plastic biliary stent.  This is being done to remove the stent and evaluate for any remaining common bile duct stones     Patient is 27 y.o. female presents with desire for elective ERCP with removal of stent and removal of any remaining stones.      ROS/HISTORY/ CURRENT MEDICATIONS/OBJECTIVE/VS/PE:   Review of Systems:  All systems unremarkable unless specified below.  Constitutional   HENT  Eyes   Respiratory    Cardiovascular  Gastrointestinal   Endocrine  Genitourinary    Musculoskeletal   Skin  Allergic/Immunologic    Neurological    Hematological  Psychiatric/Behavioral    History:     Past Medical History:   Diagnosis Date   • Anxiety      Past Surgical History:   Procedure Laterality Date   •  SECTION N/A 2022    Procedure:  SECTION PRIMARY;  Surgeon: Sho Lowe MD;  Location: [facility] LABOR DELIVERY;  Service: Obstetrics;  Laterality: N/A;   • CHOLECYSTECTOMY WITH INTRAOPERATIVE CHOLANGIOGRAM N/A 2022    Procedure: CHOLECYSTECTOMY LAPAROSCOPIC INTRAOPERATIVE CHOLANGIOGRAM;  Surgeon: Jeffry Witt MD;  Location: [facility] OR;  Service: General;  Laterality: N/A;   • ERCP WITH STENT PLACEMENT N/A 2022    Procedure: ENDOSCOPIC RETROGRADE CHOLANGIOPANCREATOGRAPHY WITH STENT PLACEMENT;  Surgeon: Tyrone Lowe DO;  Location: [facility] ENDOSCOPY;  Service: Gastroenterology;  Laterality: N/A;   • KNEE CARTILAGE SURGERY Left      Family History   Problem Relation  Age of Onset   • Hypertension Mother    • Hypertension Maternal Grandmother    • Diabetes Maternal Grandmother    • Depression Maternal Grandmother      Social History     Tobacco Use   • Smoking status: Never   • Smokeless tobacco: Never   Vaping Use   • Vaping Use: Never used   Substance Use Topics   • Alcohol use: Yes     Comment: occasionally   • Drug use: Never     Prior to Admission medications    Medication Sig Start Date End Date Taking? Authorizing Provider   norethindrone-ethinyl estradiol FE (Junel FE 1/20) 1-20 MG-MCG per tablet Take 1 tablet by mouth Daily. 3/8/22 3/8/23 Yes Sho Louise MD   sertraline (ZOLOFT) 50 MG tablet TAKE 1 TABLET BY MOUTH DAILY 7/19/22 7/19/23 Yes Sho Louise MD     Allergies:  Patient has no known allergies.    I have reviewed the patients medical history, surgical history and family history in the available medical record system.     Current Medications:     Current Facility-Administered Medications   Medication Dose Route Frequency Provider Last Rate Last Admin   • dextrose 5 % and sodium chloride 0.45 % infusion  30 mL/hr Intravenous Continuous PRN Tyrone Louise DO 30 mL/hr at 11/15/22 1026 30 mL/hr at 11/15/22 1026       Objective     Physical Exam:   Temp:  [96.5 °F (35.8 °C)] 96.5 °F (35.8 °C)  Heart Rate:  [85] 85  Resp:  [20] 20  BP: (146)/(70) 146/70    Physical Exam:  General Appearance:    Alert, cooperative, in no acute distress   Head:    Normocephalic, without obvious abnormality, atraumatic   Eyes:            Lids and lashes normal, conjunctivae and sclerae normal, no icterus, no pallor, corneas clear, PERRLA   Ears:    Ears appear intact with no abnormalities noted   Throat:   No oral lesions, no thrush, oral mucosa moist   Neck:   No adenopathy, supple, trachea midline, no thyromegaly, no  carotid bruit, no JVD   Back:     No kyphosis present, no scoliosis present, no skin lesions,   erythema or scars, no tenderness to  percussion or                 palpation,  range of motion normal   Lungs:     Clear to auscultation,respirations regular, even and         unlabored    Heart:    Regular rhythm and normal rate, normal S1 and S2, no  murmur, no gallop, no rub, no click   Breast Exam:    Deferred   Abdomen:     Normal bowel sounds, no masses, no organomegaly, soft  nontender, nondistended, no guarding, no rebound                 tenderness   Genitalia:    Deferred   Extremities:   Moves all extremities well, no edema, no cyanosis, no          redness   Pulses:   Pulses palpable and equal bilaterally   Skin:   No bleeding, bruising or rash   Lymph nodes:   No palpable adenopathy   Neurologic:   Cranial nerves 2 - 12 grossly intact, sensation intact, DTR     present and equal bilaterally      Results Review:     Lab Results   Component Value Date    WBC 6.96 09/05/2022    WBC 8.44 09/04/2022    WBC 10.53 01/18/2022    HGB 11.1 (L) 09/05/2022    HGB 12.1 09/04/2022    HGB 10.0 (L) 01/21/2022    HCT 34.1 09/05/2022    HCT 35.8 09/04/2022    HCT 30.2 (L) 01/21/2022     09/05/2022     09/04/2022     01/18/2022             No results found for: LIPASE  No results found for: INR  No results found for: CULTURE    Radiology Review:  Imaging Results (Last 72 Hours)     ** No results found for the last 72 hours. **           I reviewed the patient's new clinical results.  I reviewed the patient's new imaging results and agree with the interpretation.     ASSESSMENT/PLAN:   ASSESSMENT:  1.  Common bile duct stone status post endoscopic sphincterotomy with placement of straight plastic biliary stent    PLAN:  1.  Endoscopic retrograde cholangiogram with removal of stent and removal of any remaining stones    Risk and benefits associated with the procedure are reviewed with the patient.  The patient wished to proceed     Tyrone Louise DO  11/15/22  10:42 CST

## 2022-11-16 LAB — REF LAB TEST METHOD: NORMAL

## 2023-02-07 ENCOUNTER — LAB (OUTPATIENT)
Dept: LAB | Facility: HOSPITAL | Age: 28
End: 2023-02-07
Payer: MEDICAID

## 2023-02-07 ENCOUNTER — INITIAL PRENATAL (OUTPATIENT)
Dept: OBSTETRICS AND GYNECOLOGY | Facility: CLINIC | Age: 28
End: 2023-02-07
Payer: MEDICAID

## 2023-02-07 VITALS — WEIGHT: 293 LBS | BODY MASS INDEX: 53.28 KG/M2 | DIASTOLIC BLOOD PRESSURE: 66 MMHG | SYSTOLIC BLOOD PRESSURE: 116 MMHG

## 2023-02-07 DIAGNOSIS — E66.01 MORBID OBESITY WITH BMI OF 50.0-59.9, ADULT: ICD-10-CM

## 2023-02-07 DIAGNOSIS — Z32.00 PREGNANCY EXAMINATION OR TEST, PREGNANCY UNCONFIRMED: ICD-10-CM

## 2023-02-07 DIAGNOSIS — Z34.80 PRENATAL CARE OF MULTIGRAVIDA, ANTEPARTUM: Primary | ICD-10-CM

## 2023-02-07 DIAGNOSIS — O36.80X0 ENCOUNTER TO DETERMINE FETAL VIABILITY OF PREGNANCY, SINGLE OR UNSPECIFIED FETUS: ICD-10-CM

## 2023-02-07 DIAGNOSIS — Z87.59 HISTORY OF GESTATIONAL HYPERTENSION: ICD-10-CM

## 2023-02-07 DIAGNOSIS — Z32.01 POSITIVE PREGNANCY TEST: ICD-10-CM

## 2023-02-07 DIAGNOSIS — Z36.87 UNSURE OF LMP (LAST MENSTRUAL PERIOD) AS REASON FOR ULTRASOUND SCAN: ICD-10-CM

## 2023-02-07 LAB
ABO GROUP BLD: NORMAL
ALBUMIN SERPL-MCNC: 4.2 G/DL (ref 3.5–5.2)
ALBUMIN/GLOB SERPL: 1.1 G/DL
ALP SERPL-CCNC: 107 U/L (ref 39–117)
ALT SERPL W P-5'-P-CCNC: 16 U/L (ref 1–33)
ANION GAP SERPL CALCULATED.3IONS-SCNC: 12.2 MMOL/L (ref 5–15)
AST SERPL-CCNC: 16 U/L (ref 1–32)
B-HCG UR QL: POSITIVE
BASOPHILS # BLD AUTO: 0.03 10*3/MM3 (ref 0–0.2)
BASOPHILS NFR BLD AUTO: 0.3 % (ref 0–1.5)
BILIRUB SERPL-MCNC: 0.3 MG/DL (ref 0–1.2)
BILIRUB UR QL STRIP: NEGATIVE
BLD GP AB SCN SERPL QL: NEGATIVE
BUN SERPL-MCNC: 7 MG/DL (ref 6–20)
BUN/CREAT SERPL: 11.3 (ref 7–25)
CALCIUM SPEC-SCNC: 9.5 MG/DL (ref 8.6–10.5)
CHLORIDE SERPL-SCNC: 107 MMOL/L (ref 98–107)
CLARITY UR: CLEAR
CO2 SERPL-SCNC: 19.8 MMOL/L (ref 22–29)
COLOR UR: YELLOW
CREAT SERPL-MCNC: 0.62 MG/DL (ref 0.57–1)
DEPRECATED RDW RBC AUTO: 39.9 FL (ref 37–54)
EGFRCR SERPLBLD CKD-EPI 2021: 126.9 ML/MIN/1.73
EOSINOPHIL # BLD AUTO: 0.03 10*3/MM3 (ref 0–0.4)
EOSINOPHIL NFR BLD AUTO: 0.3 % (ref 0.3–6.2)
ERYTHROCYTE [DISTWIDTH] IN BLOOD BY AUTOMATED COUNT: 12.4 % (ref 12.3–15.4)
EXPIRATION DATE: ABNORMAL
GLOBULIN UR ELPH-MCNC: 3.7 GM/DL
GLUCOSE SERPL-MCNC: 103 MG/DL (ref 65–99)
GLUCOSE UR STRIP-MCNC: NEGATIVE MG/DL
HBV SURFACE AG SERPL QL IA: NORMAL
HCG INTACT+B SERPL-ACNC: 2085 MIU/ML
HCT VFR BLD AUTO: 35.5 % (ref 34–46.6)
HCV AB SER DONR QL: NORMAL
HGB BLD-MCNC: 11.7 G/DL (ref 12–15.9)
HGB UR QL STRIP.AUTO: NEGATIVE
HIV1+2 AB SER QL: NORMAL
IMM GRANULOCYTES # BLD AUTO: 0.02 10*3/MM3 (ref 0–0.05)
IMM GRANULOCYTES NFR BLD AUTO: 0.2 % (ref 0–0.5)
INTERNAL NEGATIVE CONTROL: NEGATIVE
INTERNAL POSITIVE CONTROL: POSITIVE
KETONES UR QL STRIP: NEGATIVE
LEUKOCYTE ESTERASE UR QL STRIP.AUTO: NEGATIVE
LYMPHOCYTES # BLD AUTO: 2.34 10*3/MM3 (ref 0.7–3.1)
LYMPHOCYTES NFR BLD AUTO: 27 % (ref 19.6–45.3)
Lab: ABNORMAL
Lab: NORMAL
MCH RBC QN AUTO: 29.1 PG (ref 26.6–33)
MCHC RBC AUTO-ENTMCNC: 33 G/DL (ref 31.5–35.7)
MCV RBC AUTO: 88.3 FL (ref 79–97)
MONOCYTES # BLD AUTO: 0.4 10*3/MM3 (ref 0.1–0.9)
MONOCYTES NFR BLD AUTO: 4.6 % (ref 5–12)
NEUTROPHILS NFR BLD AUTO: 5.84 10*3/MM3 (ref 1.7–7)
NEUTROPHILS NFR BLD AUTO: 67.6 % (ref 42.7–76)
NITRITE UR QL STRIP: NEGATIVE
NRBC BLD AUTO-RTO: 0 /100 WBC (ref 0–0.2)
PH UR STRIP.AUTO: 6 [PH] (ref 5–8)
PLATELET # BLD AUTO: 352 10*3/MM3 (ref 140–450)
PMV BLD AUTO: 10.6 FL (ref 6–12)
POTASSIUM SERPL-SCNC: 3.8 MMOL/L (ref 3.5–5.2)
PROT SERPL-MCNC: 7.9 G/DL (ref 6–8.5)
PROT UR QL STRIP: NEGATIVE
RBC # BLD AUTO: 4.02 10*6/MM3 (ref 3.77–5.28)
RH BLD: POSITIVE
SODIUM SERPL-SCNC: 139 MMOL/L (ref 136–145)
SP GR UR STRIP: 1.03 (ref 1–1.03)
UROBILINOGEN UR QL STRIP: NORMAL
WBC NRBC COR # BLD: 8.66 10*3/MM3 (ref 3.4–10.8)

## 2023-02-07 PROCEDURE — 84702 CHORIONIC GONADOTROPIN TEST: CPT | Performed by: STUDENT IN AN ORGANIZED HEALTH CARE EDUCATION/TRAINING PROGRAM

## 2023-02-07 PROCEDURE — 80306 DRUG TEST PRSMV INSTRMNT: CPT | Performed by: STUDENT IN AN ORGANIZED HEALTH CARE EDUCATION/TRAINING PROGRAM

## 2023-02-07 PROCEDURE — 86803 HEPATITIS C AB TEST: CPT | Performed by: STUDENT IN AN ORGANIZED HEALTH CARE EDUCATION/TRAINING PROGRAM

## 2023-02-07 PROCEDURE — 87591 N.GONORRHOEAE DNA AMP PROB: CPT | Performed by: STUDENT IN AN ORGANIZED HEALTH CARE EDUCATION/TRAINING PROGRAM

## 2023-02-07 PROCEDURE — 80053 COMPREHEN METABOLIC PANEL: CPT | Performed by: STUDENT IN AN ORGANIZED HEALTH CARE EDUCATION/TRAINING PROGRAM

## 2023-02-07 PROCEDURE — 81003 URINALYSIS AUTO W/O SCOPE: CPT | Performed by: STUDENT IN AN ORGANIZED HEALTH CARE EDUCATION/TRAINING PROGRAM

## 2023-02-07 PROCEDURE — 81025 URINE PREGNANCY TEST: CPT | Performed by: STUDENT IN AN ORGANIZED HEALTH CARE EDUCATION/TRAINING PROGRAM

## 2023-02-07 PROCEDURE — 87661 TRICHOMONAS VAGINALIS AMPLIF: CPT | Performed by: STUDENT IN AN ORGANIZED HEALTH CARE EDUCATION/TRAINING PROGRAM

## 2023-02-07 PROCEDURE — 36415 COLL VENOUS BLD VENIPUNCTURE: CPT

## 2023-02-07 PROCEDURE — 80081 OBSTETRIC PANEL INC HIV TSTG: CPT | Performed by: STUDENT IN AN ORGANIZED HEALTH CARE EDUCATION/TRAINING PROGRAM

## 2023-02-07 PROCEDURE — 87086 URINE CULTURE/COLONY COUNT: CPT | Performed by: STUDENT IN AN ORGANIZED HEALTH CARE EDUCATION/TRAINING PROGRAM

## 2023-02-07 PROCEDURE — 87491 CHLMYD TRACH DNA AMP PROBE: CPT | Performed by: STUDENT IN AN ORGANIZED HEALTH CARE EDUCATION/TRAINING PROGRAM

## 2023-02-07 RX ORDER — PRENATAL VIT NO.126/IRON/FOLIC 28MG-0.8MG
TABLET ORAL DAILY
COMMUNITY

## 2023-02-07 NOTE — PROGRESS NOTES
I spent approximately 60 minutes with the patient acquiring the health and history intake, reviewing prior records, discussing topics related to healthy pregnancy, entering orders, and documentation. Her LMP is approximately 12/20/22. She had a postive UPT in the office today. This is an unexpected pregnancy. She was taking birth control pills. She is considering an IUD after this delivery. This is her 2nd pregnancy. She had a csection with her daughter on 1/20/22 at 40 weeks and 2 days gestation. She was induced. She had GHTN. A csection was done due to failed IOL and failure to progress. She had postpartum depression and was put on Zoloft. We discussed that today. She says she is aware of the signs now and knows to call if she develops postpartum depression again.   A newob bag is given. The 1st trimester teaching was done with the patient. We discussed a healthy diet and exercise and what is recommended. I also discussed Listeriosis and Toxoplasmosis and what fish to avoid due to high mercury levels. I informed patient not to be in hot tubs, saunas, or tanning beds. We discussed that spotting may occur after intercourse which is common, but if heavy bleeding like a period occurs to call [facility] or hospital if clinic is closed.  I encouraged her to make an appointment with the dentist if she has not had a dental exam and cleaning in the last 6 months.  I instructed the patient that alcohol, illicit drug use, and tobacco smoking are not recommended in pregnancy.  She plans to try and breastfeed again.  She filled out the health department referral form and depression screening questionnaire. She scored 11 on the questionnaire.  I talked to her about education classes. I told her that we are doing in-person classes now. I gave her a schedule of the upcoming classes. She is interested in taking the breastfeeding class.  I encouraged the patient to get the TDAP vaccine in the 3rd trimester.  I discussed with  the patient that a pediatrician needs to be chosen prior to delivery for the infant to have an appointment scheduled before leaving the hospital. Her daughter sees Dr. Claudia Santamaria. I discussed lab tests will be done today. An early 1 hr glucola is ordered due to BMI 53. A 24 hr urine protein and CMP are ordered due to history of GHTN.  She is interested in genetic testing. Her last pap smear was negative in 2021. All questions were answered at this time. She is scheduled for an ultrasound and to see Kathryn SIMMONS on 2/21/23.

## 2023-02-08 LAB
AMPHET+METHAMPHET UR QL: NEGATIVE
AMPHETAMINES UR QL: NEGATIVE
BACTERIA SPEC AEROBE CULT: NORMAL
BARBITURATES UR QL SCN: NEGATIVE
BENZODIAZ UR QL SCN: NEGATIVE
BUPRENORPHINE SERPL-MCNC: NEGATIVE NG/ML
C TRACH RRNA CVX QL NAA+PROBE: NEGATIVE
CANNABINOIDS SERPL QL: NEGATIVE
COCAINE UR QL: NEGATIVE
METHADONE UR QL SCN: NEGATIVE
N GONORRHOEA RRNA SPEC QL NAA+PROBE: NEGATIVE
OPIATES UR QL: NEGATIVE
OXYCODONE UR QL SCN: NEGATIVE
PCP UR QL SCN: NEGATIVE
PROPOXYPH UR QL: NEGATIVE
RPR SER QL: NORMAL
RUBV IGG SERPL IA-ACNC: <0.9 INDEX
TRICHOMONAS VAGINALIS PCR: NEGATIVE
TRICYCLICS UR QL SCN: NEGATIVE

## 2023-02-14 ENCOUNTER — REFERRAL TRIAGE (OUTPATIENT)
Dept: LABOR AND DELIVERY | Facility: HOSPITAL | Age: 28
End: 2023-02-14
Payer: MEDICAID

## 2023-02-21 ENCOUNTER — LAB (OUTPATIENT)
Dept: LAB | Facility: HOSPITAL | Age: 28
End: 2023-02-21
Payer: MEDICAID

## 2023-02-21 ENCOUNTER — INITIAL PRENATAL (OUTPATIENT)
Dept: OBSTETRICS AND GYNECOLOGY | Facility: CLINIC | Age: 28
End: 2023-02-21
Payer: MEDICAID

## 2023-02-21 VITALS — DIASTOLIC BLOOD PRESSURE: 78 MMHG | BODY MASS INDEX: 54.41 KG/M2 | SYSTOLIC BLOOD PRESSURE: 124 MMHG | WEIGHT: 293 LBS

## 2023-02-21 DIAGNOSIS — Z34.80 PRENATAL CARE OF MULTIGRAVIDA, ANTEPARTUM: ICD-10-CM

## 2023-02-21 DIAGNOSIS — Z98.891 HISTORY OF CESAREAN SECTION: ICD-10-CM

## 2023-02-21 DIAGNOSIS — O09.899 RUBELLA NON-IMMUNE STATUS, ANTEPARTUM: ICD-10-CM

## 2023-02-21 DIAGNOSIS — Z28.39 RUBELLA NON-IMMUNE STATUS, ANTEPARTUM: ICD-10-CM

## 2023-02-21 DIAGNOSIS — Z13.79 GENETIC SCREENING: ICD-10-CM

## 2023-02-21 DIAGNOSIS — Z87.59 HISTORY OF GESTATIONAL HYPERTENSION: ICD-10-CM

## 2023-02-21 DIAGNOSIS — Z34.80 PRENATAL CARE, SUBSEQUENT PREGNANCY, UNSPECIFIED TRIMESTER: ICD-10-CM

## 2023-02-21 DIAGNOSIS — Z3A.01 6 WEEKS GESTATION OF PREGNANCY: Primary | ICD-10-CM

## 2023-02-21 LAB
COLLECT DURATION TIME UR: 24 HRS
PROT 24H UR-MRATE: 176.7 MG/24HOURS (ref 0–150)
SPECIMEN VOL 24H UR: 2850 ML

## 2023-02-21 PROCEDURE — 99214 OFFICE O/P EST MOD 30 MIN: CPT

## 2023-02-21 PROCEDURE — 84156 ASSAY OF PROTEIN URINE: CPT

## 2023-02-21 PROCEDURE — 81050 URINALYSIS VOLUME MEASURE: CPT

## 2023-02-21 RX ORDER — ONDANSETRON 8 MG/1
8 TABLET, ORALLY DISINTEGRATING ORAL EVERY 8 HOURS PRN
Qty: 30 TABLET | Refills: 3 | Status: SHIPPED | OUTPATIENT
Start: 2023-02-21 | End: 2023-03-23

## 2023-02-21 NOTE — PROGRESS NOTES
[facility]  Obstetrics  Date of Service: 2023    CHIEF COMPLAINT:  New prenatal visit    HISTORY OF PRESENT ILLNESS:  Denise Rod is a 27 y.o. y/o  at 6w4d by LMP (Patient's last menstrual period was 2022.).  This was a unplanned pregnancy and the patient is supported by family at home.  Reports  nausea without vomiting.  Reports breast tenderness.  She denies any vaginal bleeding.  She has  started taking a prenatal vitamin. She seems very disappointed that she is not further along as she thought.     REVIEW OF SYSTEMS  Review of Systems   Constitutional: Negative for appetite change, chills, fatigue and fever.   Respiratory: Negative for apnea, cough, choking, chest tightness, shortness of breath, wheezing and stridor.    Cardiovascular: Negative for chest pain, palpitations and leg swelling.   Gastrointestinal: Positive for nausea. Negative for abdominal pain, constipation, diarrhea and vomiting.   Genitourinary: Negative for amenorrhea, breast discharge, breast lump, breast pain, decreased libido, decreased urine volume, difficulty urinating, dyspareunia, dysuria, flank pain, frequency, genital sores, hematuria, menstrual problem, pelvic pain, pelvic pressure, urgency, urinary incontinence, vaginal bleeding, vaginal discharge and vaginal pain.       PRENATAL RISK FACTORS   Problems (from 23 to present)     No problems associated with this episode.          DATING CRITERIA:  LMP (22) -- ARMINDA 23  1TUS (23 at 6w4d) -- ARMINDA 10/13/23    OBSTETRIC HISTORY:  OB History    Para Term  AB Living   2 1 1     1   SAB IAB Ectopic Molar Multiple Live Births           0 1      # Outcome Date GA Lbr Sagar/2nd Weight Sex Delivery Anes PTL Lv   2 Current            1 Term 22 40w2d  3460 g (7 lb 10.1 oz) F CS-LTranv Spinal N EZEQUIEL      Complications: Failure to Progress in First Stage     GYN HISTORY:  Denies h/o sexually  transmitted infections/pelvic inflammatory disease  Denies h/o abnormal pap smears  Last pap smear:   Last Completed Pap Smear     This patient has no relevant Health Maintenance data.        Denies h/o gynecologic surgeries, including biopsies of the cervix    PAST MEDICAL HISTORY:  Past Medical History:   Diagnosis Date   • Anxiety    • Depression    • Gestational hypertension      PAST SURGICAL HISTORY:  Past Surgical History:   Procedure Laterality Date   •  SECTION N/A 2022    Procedure:  SECTION PRIMARY;  Surgeon: Sho Louise MD;  Location: [facility] LABOR DELIVERY;  Service: Obstetrics;  Laterality: N/A;   • CHOLECYSTECTOMY WITH INTRAOPERATIVE CHOLANGIOGRAM N/A 2022    Procedure: CHOLECYSTECTOMY LAPAROSCOPIC INTRAOPERATIVE CHOLANGIOGRAM;  Surgeon: Jeffry Witt MD;  Location: [facility] OR;  Service: General;  Laterality: N/A;   • ERCP N/A 11/15/2022    Procedure: ENDOSCOPIC RETROGRADE CHOLANGIOPANCREATOGRAPHY 11:00;  Surgeon: Tyrone Louise DO;  Location: [facility] ENDOSCOPY;  Service: Gastroenterology;  Laterality: N/A;   • ERCP WITH STENT PLACEMENT N/A 2022    Procedure: ENDOSCOPIC RETROGRADE CHOLANGIOPANCREATOGRAPHY WITH STENT PLACEMENT;  Surgeon: Tyrone Louise DO;  Location: [facility] ENDOSCOPY;  Service: Gastroenterology;  Laterality: N/A;   • KNEE CARTILAGE SURGERY Left    • LAPAROSCOPIC CHOLECYSTECTOMY       FAMILY HISTORY:  Family History   Problem Relation Age of Onset   • Irregular heart beat Mother    • No Known Problems Daughter    • Hypertension Maternal Grandmother    • Diabetes Maternal Grandmother    • Depression Maternal Grandmother      SOCIAL HISTORY:  Social History     Socioeconomic History   • Marital status:    Tobacco Use   • Smoking status: Never   • Smokeless tobacco: Never   Vaping Use   • Vaping Use: Never used   Substance and Sexual Activity   • Alcohol use: Not Currently     Comment: occasionally   • Drug use: Never    • Sexual activity: Yes     Partners: Male     Comment: last pap smear 6/17/21 negative at [place]      GENETIC SCREENING:  Age >36 yo as of ARMINDA: no  Thalassemia: no  NTD: no  CHD: no  Down Syndrome/MR/Fragile X/Autism: no  Ashkenazi Mandaen with Abdelrahman-Sachs, [place], familial dysautonomia: no  Sickle cell disease or trait: no  Hemophilia: no  Muscular dystrophy: no  Cystic fibrosis: no  Arely's chorea: no  Birth defects: no  Genetic/chromosomal disorders: no    INFECTION HISTORY:  TB exposure: no  HSV: no  Illness since LMP: no  Prior GBS infected child: no  STIs: no    ALLERGIES:  No Known Allergies    MEDICATIONS:  Prior to Admission medications    Medication Sig Start Date End Date Taking? Authorizing Provider   prenatal vitamin (prenatal, CLASSIC, vitamin) tablet Take  by mouth Daily.   Yes Provider, MD Fan   ondansetron ODT (ZOFRAN-ODT) 8 MG disintegrating tablet Place 1 tablet on the tongue Every 8 (Eight) Hours As Needed for Nausea or Vomiting for up to 30 days. 2/21/23 3/23/23  Kathryn Castle APRN       PHYSICAL EXAM:   /78   Wt (!) 144 kg (317 lb)   LMP 12/20/2022   BMI 54.41 kg/m²   General: Alert, healthy, no distress, well nourished and well developed.  Neurologic: Alert, oriented to person, place, and time.  Gait normal.  Cranial nerves II-XII grossly intact.  HEENT: Normocephalic, atraumatic.  Extraocular muscles intact, pupils equal and reactive x2.    Teeth: Normal hygiene.  Neck: Supple, no adenopathy, thyroid normal size, non-tender, without nodularity, trachea midline.  Lungs: Normal respiratory effort.  Clear to auscultation bilaterally.  No wheezes, rhonci, or rales.  Heart: Regular rate and rhythm.  No murmer, rub or gallop.  Abdomen: Soft, non-tender, non-distended,no masses, no hepatosplenomegaly, no hernia.  Skin: No rash, no lesions.  Extremities: No cyanosis, clubbing or edema.      Prelim US: CRL: 0.71cm, FHR: 138bpm, intrauterine, GS sac and YS seen. Rt ovary  marcelo lut cyst, lt ovary not seen    IMPRESSION:  Denise Rubalcava Nicole Rod is a 27 y.o.  at 6w4d for a new prenatal visit.    PLAN:  1.  IUP at 6w4d  - Prenatal labs reviewed  - Genetic testing, including cystic fibrosis, was discussed and patient desires NIPS, carrier and gender. To be drawn at next appointment.   - Continue prenatal vitamins  - Weight gain counseling performed.   - Pregravid BMI >30: Recommend 11-20 lb  - Return to clinic in 4 weeks for return prenatal visit and early 1hr glucola  - Reviewed COVID-19 visitation policy  - Reviewed COVID-19 precautions     Diagnosis Plan   1. 6 weeks gestation of pregnancy        2. Rubella non-immune status, antepartum        3. History of gestational hypertension  Baseline labs collected today      4. History of  section        5. Prenatal care, subsequent pregnancy, unspecified trimester  William Panorama Prenatal Test: Chromosomes 13, 18, 21, X & Y: Triploidy 22Q.11.2 Deletion - Blood,    William Horizon 14 (Pan-Ethnic Standard) - Blood,      6. Genetic screening  William Panorama Prenatal Test: Chromosomes 13, 18, 21, X & Y: Triploidy 22Q.11.2 Deletion - Blood,    William Horizon 14 (Pan-Ethnic Standard) - Blood,        Kathryn Castle, IRIS  2023  11:22 CST

## 2023-03-21 ENCOUNTER — ROUTINE PRENATAL (OUTPATIENT)
Dept: OBSTETRICS AND GYNECOLOGY | Facility: CLINIC | Age: 28
End: 2023-03-21
Payer: MEDICAID

## 2023-03-21 ENCOUNTER — LAB (OUTPATIENT)
Dept: LAB | Facility: HOSPITAL | Age: 28
End: 2023-03-21
Payer: MEDICAID

## 2023-03-21 VITALS — BODY MASS INDEX: 54.34 KG/M2 | SYSTOLIC BLOOD PRESSURE: 128 MMHG | DIASTOLIC BLOOD PRESSURE: 72 MMHG | WEIGHT: 293 LBS

## 2023-03-21 DIAGNOSIS — Z34.80 PRENATAL CARE OF MULTIGRAVIDA, ANTEPARTUM: ICD-10-CM

## 2023-03-21 DIAGNOSIS — O34.219 PREVIOUS CESAREAN DELIVERY AFFECTING PREGNANCY: ICD-10-CM

## 2023-03-21 DIAGNOSIS — Z3A.10 10 WEEKS GESTATION OF PREGNANCY: ICD-10-CM

## 2023-03-21 DIAGNOSIS — Z36.89 ENCOUNTER FOR FETAL ANATOMIC SURVEY: ICD-10-CM

## 2023-03-21 DIAGNOSIS — O21.9 NAUSEA AND VOMITING DURING PREGNANCY: ICD-10-CM

## 2023-03-21 DIAGNOSIS — O09.91 SUPERVISION OF HIGH RISK PREGNANCY IN FIRST TRIMESTER: Primary | ICD-10-CM

## 2023-03-21 DIAGNOSIS — Z87.59 HISTORY OF GESTATIONAL HYPERTENSION: ICD-10-CM

## 2023-03-21 DIAGNOSIS — O99.211 OBESITY AFFECTING PREGNANCY IN FIRST TRIMESTER: ICD-10-CM

## 2023-03-21 DIAGNOSIS — E66.01 MORBID OBESITY WITH BMI OF 50.0-59.9, ADULT: ICD-10-CM

## 2023-03-21 PROBLEM — T18.3XXA FOREIGN BODY IN INTESTINE AND COLON: Status: RESOLVED | Noted: 2022-10-21 | Resolved: 2023-03-21

## 2023-03-21 PROBLEM — T18.4XXA FOREIGN BODY IN INTESTINE AND COLON: Status: RESOLVED | Noted: 2022-10-21 | Resolved: 2023-03-21

## 2023-03-21 LAB — GLUCOSE 1H P 100 G GLC PO SERPL-MCNC: 120 MG/DL (ref 65–139)

## 2023-03-21 PROCEDURE — 82950 GLUCOSE TEST: CPT

## 2023-03-21 PROCEDURE — 36415 COLL VENOUS BLD VENIPUNCTURE: CPT

## 2023-03-21 PROCEDURE — 99213 OFFICE O/P EST LOW 20 MIN: CPT | Performed by: OBSTETRICS & GYNECOLOGY

## 2023-03-21 RX ORDER — PROMETHAZINE HYDROCHLORIDE 12.5 MG/1
12.5 TABLET ORAL EVERY 6 HOURS PRN
Qty: 30 TABLET | Refills: 3 | Status: SHIPPED | OUTPATIENT
Start: 2023-03-21

## 2023-03-21 NOTE — PROGRESS NOTES
CC: Prenatal visit    Denise Rod is a 27 y.o.  at 10w4d.  Doing well.  Denies contractions, LOF, or VB.  States that she is having nausea despite Zofran; requests a different medication.     /72   Wt (!) 144 kg (316 lb 9.6 oz)   LMP 2022   BMI 54.34 kg/m²   Fetal Heart Rate: 150s     Problems (from 23 to present)     Problem Noted Resolved    Supervision of high risk pregnancy in first trimester 3/21/2023 by Sho Louise MD No    Previous  delivery affecting pregnancy 3/21/2023 by Sho Louise MD No    Overview Signed 3/21/2023  9:12 AM by Sho Louise MD     G1- Failure to progress/failed induction of labor         History of gestational hypertension 3/21/2023 by Sho Louise MD No    Overview Signed 3/21/2023  9:11 AM by Sho Louise MD     Baseline labs ordered         Obesity affecting pregnancy in first trimester 3/21/2023 by Sho Louise MD No    Overview Signed 3/21/2023  9:11 AM by Sho Louise MD     Early glucola ordered               A/P: Denise Rod is a 27 y.o.  at 10w4d.  - RTC in 4 weeks  - Desires NIPT  - Glucola today  - RTC in 8 weeks w/ anatomy US  - Discussed TOLAC vs RLTCS.  Discussed high risk of failure due to prior failed IOL; she wishes to have RLTCS.  -      Diagnosis Plan   1. Supervision of high risk pregnancy in first trimester        2. Previous  delivery affecting pregnancy        3. History of gestational hypertension        4. Obesity affecting pregnancy in first trimester        5. 10 weeks gestation of pregnancy  William Figueroa Prenatal Test: Chromosomes 13, 18, 21, X & Y: Triploidy 22Q.11.2 Deletion - Blood,      6. Nausea and vomiting during pregnancy  promethazine (PHENERGAN) 12.5 MG tablet      7. Encounter for fetal anatomic survey  US Ob Detail Fetal Anatomy Single or  First Gestation        Sho Louise MD  3/21/2023  09:31 CDT

## 2023-03-27 LAB
Lab: NORMAL
NTRA FETAL FRACTION: NORMAL
NTRA GENDER OF FETUS: NORMAL
NTRA MONOSOMY X AGE-BASED RISK TEXT: NORMAL
NTRA MONOSOMY X RESULT TEXT: NORMAL
NTRA MONOSOMY X RISK SCORE TEXT: NORMAL
NTRA TRIPLOIDY RESULT TEXT: NORMAL
NTRA TRISOMY 13 AGE-BASED RISK TEXT: NORMAL
NTRA TRISOMY 13 RESULT TEXT: NORMAL
NTRA TRISOMY 13 RISK SCORE TEXT: NORMAL
NTRA TRISOMY 18 AGE-BASED RISK TEXT: NORMAL
NTRA TRISOMY 18 RESULT TEXT: NORMAL
NTRA TRISOMY 18 RISK SCORE TEXT: NORMAL
NTRA TRISOMY 21 AGE-BASED RISK TEXT: NORMAL
NTRA TRISOMY 21 RESULT TEXT: NORMAL
NTRA TRISOMY 21 RISK SCORE TEXT: NORMAL

## 2023-03-28 ENCOUNTER — PATIENT MESSAGE (OUTPATIENT)
Dept: OBSTETRICS AND GYNECOLOGY | Facility: CLINIC | Age: 28
End: 2023-03-28
Payer: MEDICAID

## 2023-03-28 DIAGNOSIS — O99.711 PRURITUS OF PREGNANCY IN FIRST TRIMESTER: Primary | ICD-10-CM

## 2023-03-28 DIAGNOSIS — L29.9 PRURITUS OF PREGNANCY IN FIRST TRIMESTER: Primary | ICD-10-CM

## 2023-03-28 RX ORDER — METHYLPREDNISOLONE 4 MG/1
TABLET ORAL
Qty: 21 TABLET | Refills: 0 | Status: SHIPPED | OUTPATIENT
Start: 2023-03-28

## 2023-04-03 ENCOUNTER — PATIENT OUTREACH (OUTPATIENT)
Dept: LABOR AND DELIVERY | Facility: HOSPITAL | Age: 28
End: 2023-04-03
Payer: MEDICAID

## 2023-04-03 NOTE — OUTREACH NOTE
[facility]  Enrollment    Current Estimated Gestational Age: 12w3d    Questions/Answers    Flowsheet Row Responses   Would like to participate? Yes   Date of Intake Visit 04/11/23          Patient wants to be enrolled in program but wants all communication through [employer] messaging. Intake through [employer] scheduled for 4/11/23.    Reva Melgoza RN  Maternity Nurse Navigator    4/3/2023, 09:57 CDT

## 2023-04-04 ENCOUNTER — PATIENT OUTREACH (OUTPATIENT)
Dept: LABOR AND DELIVERY | Facility: HOSPITAL | Age: 28
End: 2023-04-04
Payer: MEDICAID

## 2023-04-04 NOTE — OUTREACH NOTE
[facility]    Sent Intake information requests to patient via [employer] as patient has requested communication and participation in our program this way.    Angelique Melgoza RN  Maternity Nurse Navigator    4/4/2023, 12:52 CDT

## 2023-04-18 ENCOUNTER — PATIENT OUTREACH (OUTPATIENT)
Dept: LABOR AND DELIVERY | Facility: HOSPITAL | Age: 28
End: 2023-04-18
Payer: MEDICAID

## 2023-04-18 ENCOUNTER — ROUTINE PRENATAL (OUTPATIENT)
Dept: OBSTETRICS AND GYNECOLOGY | Facility: CLINIC | Age: 28
End: 2023-04-18
Payer: MEDICAID

## 2023-04-18 VITALS — BODY MASS INDEX: 54.07 KG/M2 | SYSTOLIC BLOOD PRESSURE: 136 MMHG | WEIGHT: 293 LBS | DIASTOLIC BLOOD PRESSURE: 84 MMHG

## 2023-04-18 DIAGNOSIS — O99.212 OBESITY AFFECTING PREGNANCY IN SECOND TRIMESTER: ICD-10-CM

## 2023-04-18 DIAGNOSIS — Z3A.14 14 WEEKS GESTATION OF PREGNANCY: Primary | ICD-10-CM

## 2023-04-18 DIAGNOSIS — O34.219 PREVIOUS CESAREAN DELIVERY AFFECTING PREGNANCY: ICD-10-CM

## 2023-04-18 DIAGNOSIS — Z87.59 HISTORY OF GESTATIONAL HYPERTENSION: ICD-10-CM

## 2023-04-18 DIAGNOSIS — O09.92 SUPERVISION OF HIGH RISK PREGNANCY IN SECOND TRIMESTER: ICD-10-CM

## 2023-04-18 NOTE — OUTREACH NOTE
[facility]    Reached out to [facility] via [employer].  Second trimester information sent.    Angelique Melgoza RN  Maternity Nurse Navigator    4/18/2023, 14:02 CDT

## 2023-04-18 NOTE — PROGRESS NOTES
CC: Prenatal visit    Denise Rod is a 27 y.o.  at 14w4d.  Doing well.  Denies contractions, LOF, or VB.  No issues or concerns today.     /84   Wt (!) 143 kg (315 lb)   LMP 2022   BMI 54.07 kg/m²   SVE: NA     Fetal Heart Rate: +vscan      Problems (from 23 to present)     Problem Noted Resolved    Supervision of high risk pregnancy in second trimester 3/21/2023 by Sho Louise MD No    Previous  delivery affecting pregnancy 3/21/2023 by Sho Louise MD No    Overview Signed 3/21/2023  9:12 AM by Sho Louise MD     G1- Failure to progress/failed induction of labor         History of gestational hypertension 3/21/2023 by Sho Louise MD No    Overview Signed 3/21/2023  9:11 AM by Sho Louise MD     Baseline labs ordered         Obesity affecting pregnancy in second trimester 3/21/2023 by Sho Louise MD No    Overview Signed 3/21/2023  9:11 AM by Sho Louise MD     Early glucola ordered               A/P: Denise Rod is a 27 y.o.  at 14w4d.  - RTC in 4 weeks with anatomy scan      Diagnosis Plan   1. 14 weeks gestation of pregnancy        2. Supervision of high risk pregnancy in second trimester        3. Previous  delivery affecting pregnancy        4. History of gestational hypertension        5. Obesity affecting pregnancy in second trimester          IRIS Mason  2023  12:47 CDT

## 2023-04-24 ENCOUNTER — HOSPITAL ENCOUNTER (EMERGENCY)
Facility: HOSPITAL | Age: 28
Discharge: HOME | End: 2023-04-24
Attending: EMERGENCY MEDICINE | Admitting: EMERGENCY MEDICINE
Payer: MEDICAID

## 2023-04-24 VITALS
TEMPERATURE: 97.9 F | WEIGHT: 293 LBS | OXYGEN SATURATION: 100 % | SYSTOLIC BLOOD PRESSURE: 154 MMHG | DIASTOLIC BLOOD PRESSURE: 78 MMHG | HEIGHT: 64 IN | BODY MASS INDEX: 50.02 KG/M2 | RESPIRATION RATE: 18 BRPM | HEART RATE: 74 BPM
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
## 2023-04-24 DIAGNOSIS — Z34.92 FETAL HEART TONES PRESENT, SECOND TRIMESTER: Primary | ICD-10-CM

## 2023-04-24 PROCEDURE — 99283 EMERGENCY DEPT VISIT LOW MDM: CPT

## 2023-04-24 NOTE — ED PROVIDER NOTES
"Subjective   History of Present Illness  This is a 27-year-old female typically healthy who presents 15 weeks and 2 days pregnant with her second child after a fall to \"make sure everything is okay.\"  The patient states that she was attempting to deal with one of her children and had a stumble and fall onto her hands and knees from standing.  She states that she has had no vaginal bleeding or discharge.  She has had no dysuria hematuria or frequency.  She has had no abdominal or back pain or cramping.  She does not believe her abdomen directly struck the floor.  Nonetheless her family was concerned that she might need a checkup and so she has come in for evaluation.        Review of Systems   All other systems reviewed and are negative.      Past Medical History:   Diagnosis Date   • Anxiety    • Depression    • Gestational hypertension        No Known Allergies    Past Surgical History:   Procedure Laterality Date   •  SECTION N/A 2022    Procedure:  SECTION PRIMARY;  Surgeon: Sho Louise MD;  Location: [facility] LABOR DELIVERY;  Service: Obstetrics;  Laterality: N/A;   • CHOLECYSTECTOMY WITH INTRAOPERATIVE CHOLANGIOGRAM N/A 2022    Procedure: CHOLECYSTECTOMY LAPAROSCOPIC INTRAOPERATIVE CHOLANGIOGRAM;  Surgeon: Jeffry Witt MD;  Location: [facility] OR;  Service: General;  Laterality: N/A;   • ERCP N/A 11/15/2022    Procedure: ENDOSCOPIC RETROGRADE CHOLANGIOPANCREATOGRAPHY 11:00;  Surgeon: Tyrone Louise DO;  Location: [facility] ENDOSCOPY;  Service: Gastroenterology;  Laterality: N/A;   • ERCP WITH STENT PLACEMENT N/A 2022    Procedure: ENDOSCOPIC RETROGRADE CHOLANGIOPANCREATOGRAPHY WITH STENT PLACEMENT;  Surgeon: Tyrone Louise DO;  Location: [facility] ENDOSCOPY;  Service: Gastroenterology;  Laterality: N/A;   • KNEE CARTILAGE SURGERY Left    • LAPAROSCOPIC CHOLECYSTECTOMY         Family History   Problem Relation Age of Onset   • Irregular heart beat Mother  " "  • No Known Problems Daughter    • Hypertension Maternal Grandmother    • Diabetes Maternal Grandmother    • Depression Maternal Grandmother        Social History     Socioeconomic History   • Marital status:    Tobacco Use   • Smoking status: Never   • Smokeless tobacco: Never   Vaping Use   • Vaping Use: Never used   Substance and Sexual Activity   • Alcohol use: Not Currently     Comment: occasionally   • Drug use: Never   • Sexual activity: Yes     Partners: Male     Comment: last pap smear 6/17/21 negative at [place]            Objective   Physical Exam  Vitals and nursing note reviewed.   Constitutional:       Appearance: She is not ill-appearing.      Comments: Gravid above the pelvic brim   HENT:      Head: Normocephalic and atraumatic.   Eyes:      General: No scleral icterus.  Cardiovascular:      Rate and Rhythm: Normal rate.   Pulmonary:      Effort: Pulmonary effort is normal.   Abdominal:      Tenderness: There is no abdominal tenderness.   Musculoskeletal:         General: No signs of injury.   Skin:     General: Skin is dry.   Neurological:      Mental Status: She is alert and oriented to person, place, and time.   Psychiatric:         Behavior: Behavior normal.         Procedures           ED Course                                           Medical Decision Making  The patient's recent past medical charts for this facility as well as outside facilities via the \"care everywhere\" application of epic reviewed.    The differential diagnosis includes placenta previa, placental abruption, threatened miscarriage, and normal pregnancy, among others.          Fall, initial encounter: acute illness or injury  Fetal heart tones present, second trimester: acute illness or injury  Amount and/or Complexity of Data Reviewed  Independent Historian: spouse     Details: Case discussed with the patient's significant other who relates additional history about the fall.      Risk  OTC drugs.          Final " diagnoses:   Fetal heart tones present, second trimester   Fall, initial encounter       ED Disposition  ED Disposition     ED Disposition   Discharge    Condition   Stable    Comment   --             Lilia Cuello, IRIS  [address] [address]  [phone]    Call   As needed    [facility] EMERGENCY DEPARTMENT  [address] [facility] [address] [phone]    As needed, If symptoms worsen at any time         Medication List      No changes were made to your prescriptions during this visit.          Elton Tavarez, DO  04/24/23 1728

## 2023-04-24 NOTE — Clinical Note
[facility] EMERGENCY DEPARTMENT  [address] [place] [id number]  Phone: [phone]    Denise Rod was seen and treated in our emergency department on 4/24/2023.  She may return to work on 04/25/2023.         Thank you for choosing [facility].    Elton Tavarez, DO

## 2023-04-25 ENCOUNTER — PATIENT OUTREACH (OUTPATIENT)
Dept: LABOR AND DELIVERY | Facility: HOSPITAL | Age: 28
End: 2023-04-25
Payer: MEDICAID

## 2023-04-25 NOTE — OUTREACH NOTE
[facility]    Corresponded with patient via My Chart today.  Please see notes.    Angelique Melgoza RN  Maternity Nurse Navigator    4/25/2023, 13:39 CDT

## 2023-04-27 ENCOUNTER — ROUTINE PRENATAL (OUTPATIENT)
Dept: OBSTETRICS AND GYNECOLOGY | Facility: CLINIC | Age: 28
End: 2023-04-27
Payer: MEDICAID

## 2023-04-27 VITALS — WEIGHT: 293 LBS | BODY MASS INDEX: 53.55 KG/M2 | SYSTOLIC BLOOD PRESSURE: 146 MMHG | DIASTOLIC BLOOD PRESSURE: 78 MMHG

## 2023-04-27 DIAGNOSIS — O09.92 SUPERVISION OF HIGH RISK PREGNANCY IN SECOND TRIMESTER: ICD-10-CM

## 2023-04-27 DIAGNOSIS — Z3A.15 15 WEEKS GESTATION OF PREGNANCY: Primary | ICD-10-CM

## 2023-04-27 DIAGNOSIS — O99.212 OBESITY AFFECTING PREGNANCY IN SECOND TRIMESTER: ICD-10-CM

## 2023-04-27 DIAGNOSIS — O10.919 CHRONIC HYPERTENSION DURING PREGNANCY: ICD-10-CM

## 2023-04-27 DIAGNOSIS — O34.219 PREVIOUS CESAREAN DELIVERY AFFECTING PREGNANCY: ICD-10-CM

## 2023-04-27 DIAGNOSIS — Z87.59 HISTORY OF GESTATIONAL HYPERTENSION: ICD-10-CM

## 2023-04-27 RX ORDER — LABETALOL 100 MG/1
100 TABLET, FILM COATED ORAL 2 TIMES DAILY
Qty: 60 TABLET | Refills: 3 | Status: SHIPPED | OUTPATIENT
Start: 2023-04-27

## 2023-04-27 NOTE — PROGRESS NOTES
CC: Prenatal visit    Denise Rod is a 27 y.o.  at 15w6d.  Doing well.  Denies contractions, LOF, or VB.  She was recently seen in the ER for a fall. During that course it was noted that she had /78 & 160/77. She presents today for a follow up on this.   She denies any HA, SOB, CP, vision changes or RUQ pain.   She does not have a BP cuff at home.     /78   Wt (!) 142 kg (312 lb)   LMP 2022   BMI 53.55 kg/m²   SVE: NA     Fetal Heart Rate: +vscan      Problems (from 23 to present)     Problem Noted Resolved    Chronic hypertension during pregnancy 2023 by Kathryn Castle APRN No    Overview Signed 2023 10:16 AM by Kathryn Castle APRN     154/78, 160/77, 146/78         Supervision of high risk pregnancy in second trimester 3/21/2023 by Sho Louise MD No    Previous  delivery affecting pregnancy 3/21/2023 by Sho Louise MD No    Overview Signed 3/21/2023  9:12 AM by Sho Louise MD     G1- Failure to progress/failed induction of labor         History of gestational hypertension 3/21/2023 by Sho Louise MD No    Overview Signed 3/21/2023  9:11 AM by Sho Louise MD     Baseline labs ordered         Obesity affecting pregnancy in second trimester 3/21/2023 by Sho Louise MD No    Overview Signed 3/21/2023  9:11 AM by Sho Louise MD     Early glucola ordered               A/P: Denise Rod is a 27 y.o.  at 15w6d.  - RTC in 3 weeks anatomy scan   - Start Labetalol 100 mg BID  - Start keeping a BP log 2x/day and bring with you to next appointment     Diagnosis Plan   1. 15 weeks gestation of pregnancy        2. Supervision of high risk pregnancy in second trimester        3. Previous  delivery affecting pregnancy        4. History of gestational hypertension        5. Obesity affecting pregnancy in second  trimester        6. Chronic hypertension during pregnancy  labetalol (NORMODYNE) 100 MG tablet        IRIS Mason  4/27/2023  10:16 CDT

## 2023-05-23 ENCOUNTER — ROUTINE PRENATAL (OUTPATIENT)
Dept: OBSTETRICS AND GYNECOLOGY | Facility: CLINIC | Age: 28
End: 2023-05-23
Payer: MEDICAID

## 2023-05-23 VITALS — DIASTOLIC BLOOD PRESSURE: 86 MMHG | WEIGHT: 293 LBS | SYSTOLIC BLOOD PRESSURE: 132 MMHG | BODY MASS INDEX: 53.04 KG/M2

## 2023-05-23 DIAGNOSIS — Z87.59 HISTORY OF GESTATIONAL HYPERTENSION: ICD-10-CM

## 2023-05-23 DIAGNOSIS — O10.919 CHRONIC HYPERTENSION DURING PREGNANCY: ICD-10-CM

## 2023-05-23 DIAGNOSIS — O34.219 PREVIOUS CESAREAN DELIVERY AFFECTING PREGNANCY: ICD-10-CM

## 2023-05-23 DIAGNOSIS — O99.212 OBESITY AFFECTING PREGNANCY IN SECOND TRIMESTER: ICD-10-CM

## 2023-05-23 DIAGNOSIS — Z3A.19 19 WEEKS GESTATION OF PREGNANCY: ICD-10-CM

## 2023-05-23 DIAGNOSIS — O09.92 SUPERVISION OF HIGH RISK PREGNANCY IN SECOND TRIMESTER: Primary | ICD-10-CM

## 2023-05-23 RX ORDER — NIFEDIPINE 30 MG/1
30 TABLET, EXTENDED RELEASE ORAL DAILY
Qty: 30 TABLET | Refills: 11 | Status: SHIPPED | OUTPATIENT
Start: 2023-05-23

## 2023-05-23 NOTE — PROGRESS NOTES
CC: Prenatal visit    Denise Rod is a 27 y.o.  at 19w4d.  Doing well.  Denies contractions, LOF, or VB.  She states that the Labetalol makes her feel groggy and wants to try to something else.    /86   Wt (!) 140 kg (309 lb)   LMP 2022   BMI 53.04 kg/m²   Fetal Heart Rate: 159     Prelim US- EFW 314g (58%ile) w/ AC 55%ile, CYNDI 16.5 cm, cephalic, placenta anterior w/o previa, anatomy WNL, BOY CL 4.55 cm, R and L ovary WNL     Problems (from 23 to present)     Problem Noted Resolved    Chronic hypertension during pregnancy 2023 by Kathryn Castle APRN No    Overview Signed 2023 10:16 AM by Kathryn Castle APRN     154/78, 160/77, 146/78         Supervision of high risk pregnancy in second trimester 3/21/2023 by Sho Louise MD No    Previous  delivery affecting pregnancy 3/21/2023 by Sho Louise MD No    Overview Signed 3/21/2023  9:12 AM by Sho Louise MD     G1- Failure to progress/failed induction of labor         History of gestational hypertension 3/21/2023 by Sho Louise MD No    Overview Signed 3/21/2023  9:11 AM by Sho Louise MD     Baseline labs ordered         Obesity affecting pregnancy in second trimester 3/21/2023 by Sho Louise MD No    Overview Signed 3/21/2023  9:11 AM by Sho Louise MD     Early glucola ordered             A/P: Denise Rod is a 27 y.o.  at 19w4d.  - RTC in 4 weeks w/ GS ([facility]TN)  - RTC in 8 weeks w/ 3T labs, Tdap, breastpump script, GS ([facility]TN)  - Will switch to Procardia XL; if still symptomatic, will trial HCTZ     Diagnosis Plan   1. Supervision of high risk pregnancy in second trimester        2. Previous  delivery affecting pregnancy        3. History of gestational hypertension        4. Obesity affecting pregnancy in second trimester        5. Chronic hypertension  during pregnancy  NIFEdipine XL (Procardia XL) 30 MG 24 hr tablet    US ob follow up transabdominal approach      6. 19 weeks gestation of pregnancy          Sho Louise MD  5/23/2023  11:41 CDT

## 2023-05-30 ENCOUNTER — PATIENT OUTREACH (OUTPATIENT)
Dept: LABOR AND DELIVERY | Facility: HOSPITAL | Age: 28
End: 2023-05-30

## 2023-05-30 NOTE — OUTREACH NOTE
[facility]  Check-In    Current Estimated Gestational Age: 20w4d        Denise agreed to participate via My Chart.  First trimester info and check in note sent today.  Angelique Melgoza RN  Maternity Nurse Navigator    5/30/2023, 12:28 CDT

## 2023-07-20 ENCOUNTER — LAB (OUTPATIENT)
Dept: LAB | Facility: HOSPITAL | Age: 28
End: 2023-07-20
Payer: MEDICAID

## 2023-07-20 DIAGNOSIS — O99.810 ABNORMAL GLUCOSE TOLERANCE TEST (GTT) DURING PREGNANCY, ANTEPARTUM: ICD-10-CM

## 2023-07-20 LAB
GLUCOSE P FAST SERPL-MCNC: 95 MG/DL (ref 65–94)
GTT GEST 2H PNL UR+SERPL: 150 MG/DL (ref 65–179)
GTT GEST 3H PNL SERPL: 134 MG/DL (ref 65–154)
GTT GEST 3H PNL SERPL: 99 MG/DL (ref 65–139)

## 2023-07-20 PROCEDURE — 82951 GLUCOSE TOLERANCE TEST (GTT): CPT

## 2023-07-20 PROCEDURE — 36415 COLL VENOUS BLD VENIPUNCTURE: CPT

## 2023-07-20 PROCEDURE — 82952 GTT-ADDED SAMPLES: CPT

## 2023-07-26 ENCOUNTER — PATIENT OUTREACH (OUTPATIENT)
Dept: OBSTETRICS AND GYNECOLOGY | Facility: HOSPITAL | Age: 28
End: 2023-07-26
Payer: MEDICAID

## 2023-07-26 NOTE — OUTREACH NOTE
[facility]  Check-In    Current Estimated Gestational Age: 28w5d      Correspondence sent via [employer].  Will check in again at 36 weeks or as patient messages with needs.    Angelique Melgoza RN  Maternity Nurse Navigator    7/26/2023, 10:39 CDT

## 2023-08-01 ENCOUNTER — ROUTINE PRENATAL (OUTPATIENT)
Dept: OBSTETRICS AND GYNECOLOGY | Facility: CLINIC | Age: 28
End: 2023-08-01
Payer: MEDICAID

## 2023-08-01 VITALS — WEIGHT: 293 LBS | BODY MASS INDEX: 52.87 KG/M2 | DIASTOLIC BLOOD PRESSURE: 78 MMHG | SYSTOLIC BLOOD PRESSURE: 122 MMHG

## 2023-08-01 DIAGNOSIS — O99.212 OBESITY AFFECTING PREGNANCY IN SECOND TRIMESTER: ICD-10-CM

## 2023-08-01 DIAGNOSIS — Z3A.29 29 WEEKS GESTATION OF PREGNANCY: Primary | ICD-10-CM

## 2023-08-01 DIAGNOSIS — O10.919 CHRONIC HYPERTENSION DURING PREGNANCY: ICD-10-CM

## 2023-08-01 DIAGNOSIS — Z87.59 HISTORY OF GESTATIONAL HYPERTENSION: ICD-10-CM

## 2023-08-01 DIAGNOSIS — O34.219 PREVIOUS CESAREAN DELIVERY AFFECTING PREGNANCY: ICD-10-CM

## 2023-08-01 DIAGNOSIS — O09.92 SUPERVISION OF HIGH RISK PREGNANCY IN SECOND TRIMESTER: ICD-10-CM

## 2023-08-15 ENCOUNTER — ROUTINE PRENATAL (OUTPATIENT)
Dept: OBSTETRICS AND GYNECOLOGY | Facility: CLINIC | Age: 28
End: 2023-08-15
Payer: MEDICAID

## 2023-08-15 VITALS — SYSTOLIC BLOOD PRESSURE: 128 MMHG | BODY MASS INDEX: 53.04 KG/M2 | WEIGHT: 293 LBS | DIASTOLIC BLOOD PRESSURE: 70 MMHG

## 2023-08-15 DIAGNOSIS — O09.93 SUPERVISION OF HIGH RISK PREGNANCY IN THIRD TRIMESTER: Primary | ICD-10-CM

## 2023-08-15 DIAGNOSIS — O34.219 PREVIOUS CESAREAN DELIVERY AFFECTING PREGNANCY: ICD-10-CM

## 2023-08-15 DIAGNOSIS — Z36.89 ENCOUNTER FOR FETAL ANATOMIC SURVEY: ICD-10-CM

## 2023-08-15 DIAGNOSIS — Z3A.31 31 WEEKS GESTATION OF PREGNANCY: ICD-10-CM

## 2023-08-15 DIAGNOSIS — Z87.59 HISTORY OF GESTATIONAL HYPERTENSION: ICD-10-CM

## 2023-08-15 DIAGNOSIS — O99.213 OBESITY AFFECTING PREGNANCY IN THIRD TRIMESTER: ICD-10-CM

## 2023-08-15 DIAGNOSIS — O10.919 CHRONIC HYPERTENSION DURING PREGNANCY: ICD-10-CM

## 2023-08-15 PROBLEM — O09.92 SUPERVISION OF HIGH RISK PREGNANCY IN SECOND TRIMESTER: Status: RESOLVED | Noted: 2023-03-21 | Resolved: 2023-08-15

## 2023-08-15 PROCEDURE — 99214 OFFICE O/P EST MOD 30 MIN: CPT | Performed by: OBSTETRICS & GYNECOLOGY

## 2023-08-15 NOTE — PROGRESS NOTES
CC: Prenatal visit    Denise Rod is a 28 y.o.  at 31w4d.  Doing well.  Denies contractions, LOF, or VB.  Reports good FM.    /70   Wt (!) 140 kg (309 lb)   LMP 2022   BMI 53.04 kg/mý      Fetal Heart Rate: 151    Prelim US- EFW 1882g (53%ile) w/ AC 36%ile, CYNDI 20.4 cm, cephalic, placenta anterior, BPP  Problems (from 23 to present)       Problem Noted Resolved    Chronic hypertension during pregnancy 2023 by Kathryn Castle APRN No    Overview Signed 2023 10:16 AM by Kathryn Castle APRN     154/78, 160/77, 146/78         Supervision of high risk pregnancy in third trimester 3/21/2023 by Sho Louise MD No    Previous  delivery affecting pregnancy 3/21/2023 by Sho Louise MD No    Overview Signed 3/21/2023  9:12 AM by Sho Louise MD     G1- Failure to progress/failed induction of labor         History of gestational hypertension 3/21/2023 by Sho Louise MD No    Overview Signed 3/21/2023  9:11 AM by Sho Louise MD     Baseline labs ordered         Obesity affecting pregnancy in third trimester 3/21/2023 by Sho Louise MD No    Overview Signed 3/21/2023  9:11 AM by Sho Louise MD     Early glucola ordered               A/P: Denise Rod is a 28 y.o.  at 31w4d.  - RTC in 1 week w/ BPP     Diagnosis Plan   1. Supervision of high risk pregnancy in third trimester        2. Previous  delivery affecting pregnancy        3. History of gestational hypertension        4. Obesity affecting pregnancy in third trimester        5. Chronic hypertension during pregnancy  US Fetal Biophysical Profile;Without Non-Stress Testing      6. Encounter for fetal anatomic survey        7. 31 weeks gestation of pregnancy          Sho Louise MD  8/15/2023  11:09 CDT

## 2023-08-21 ENCOUNTER — ROUTINE PRENATAL (OUTPATIENT)
Dept: OBSTETRICS AND GYNECOLOGY | Facility: CLINIC | Age: 28
End: 2023-08-21
Payer: MEDICAID

## 2023-08-21 VITALS — BODY MASS INDEX: 52.7 KG/M2 | WEIGHT: 293 LBS | DIASTOLIC BLOOD PRESSURE: 74 MMHG | SYSTOLIC BLOOD PRESSURE: 122 MMHG

## 2023-08-21 DIAGNOSIS — O34.219 PREVIOUS CESAREAN DELIVERY AFFECTING PREGNANCY: ICD-10-CM

## 2023-08-21 DIAGNOSIS — O99.213 OBESITY AFFECTING PREGNANCY IN THIRD TRIMESTER: ICD-10-CM

## 2023-08-21 DIAGNOSIS — O10.919 CHRONIC HYPERTENSION DURING PREGNANCY: ICD-10-CM

## 2023-08-21 DIAGNOSIS — Z87.59 HISTORY OF GESTATIONAL HYPERTENSION: ICD-10-CM

## 2023-08-21 DIAGNOSIS — O09.93 SUPERVISION OF HIGH RISK PREGNANCY IN THIRD TRIMESTER: ICD-10-CM

## 2023-08-21 DIAGNOSIS — Z3A.32 32 WEEKS GESTATION OF PREGNANCY: Primary | ICD-10-CM

## 2023-08-21 PROCEDURE — 99214 OFFICE O/P EST MOD 30 MIN: CPT

## 2023-08-21 NOTE — PROGRESS NOTES
CC: Prenatal visit    Denise Rod is a 28 y.o.  at 32w3d.  Doing well.  Denies contractions, LOF, or VB.  Reports good FM.    Prelim US: Breech, CYNDI: 17.95cm, MVP: 5.31cm, Anterior placenta, BPP     /74   Wt (!) 139 kg (307 lb)   LMP 2022   BMI 52.70 kg/mý   SVE: na     Fetal Heart Rate: 148us     Problems (from 23 to present)       Problem Noted Resolved    Chronic hypertension during pregnancy 2023 by Kathryn Castle APRN No    Overview Addendum 2023 10:56 AM by Kathryn Castle APRN     154/78, 160/77, 146/78  Currently on Procardia XL          Supervision of high risk pregnancy in third trimester 3/21/2023 by Sho Louise MD No    Previous  delivery affecting pregnancy 3/21/2023 by Sho Louise MD No    Overview Signed 3/21/2023  9:12 AM by Sho Louise MD     G1- Failure to progress/failed induction of labor         History of gestational hypertension 3/21/2023 by Sho Louise MD No    Overview Signed 3/21/2023  9:11 AM by Sho Louise MD     Baseline labs ordered         Obesity affecting pregnancy in third trimester 3/21/2023 by Sho Louise MD No    Overview Signed 3/21/2023  9:11 AM by Sho Louise MD     Early glucola ordered                 A/P: Denise Rod is a 28 y.o.  at 32w3d.  - RTC in 1 weeks with BPP      Diagnosis Plan   1. 32 weeks gestation of pregnancy        2. Supervision of high risk pregnancy in third trimester        3. Previous  delivery affecting pregnancy        4. History of gestational hypertension        5. Obesity affecting pregnancy in third trimester        6. Chronic hypertension during pregnancy          IRIS Mason  2023  11:03 CDT

## 2023-08-29 ENCOUNTER — ROUTINE PRENATAL (OUTPATIENT)
Dept: OBSTETRICS AND GYNECOLOGY | Facility: CLINIC | Age: 28
End: 2023-08-29
Payer: MEDICAID

## 2023-08-29 VITALS — WEIGHT: 293 LBS | DIASTOLIC BLOOD PRESSURE: 72 MMHG | BODY MASS INDEX: 52.7 KG/M2 | SYSTOLIC BLOOD PRESSURE: 118 MMHG

## 2023-08-29 DIAGNOSIS — Z87.59 HISTORY OF GESTATIONAL HYPERTENSION: ICD-10-CM

## 2023-08-29 DIAGNOSIS — O34.219 PREVIOUS CESAREAN DELIVERY AFFECTING PREGNANCY: ICD-10-CM

## 2023-08-29 DIAGNOSIS — Z3A.33 33 WEEKS GESTATION OF PREGNANCY: Primary | ICD-10-CM

## 2023-08-29 DIAGNOSIS — O10.919 CHRONIC HYPERTENSION DURING PREGNANCY: ICD-10-CM

## 2023-08-29 DIAGNOSIS — O99.213 OBESITY AFFECTING PREGNANCY IN THIRD TRIMESTER: ICD-10-CM

## 2023-08-29 DIAGNOSIS — O09.93 SUPERVISION OF HIGH RISK PREGNANCY IN THIRD TRIMESTER: ICD-10-CM

## 2023-08-29 NOTE — PROGRESS NOTES
CC: Prenatal visit    Denise Rod is a 28 y.o.  at 33w4d.  Doing well.  Denies contractions, LOF, or VB.  Reports good FM.    BP stable on Procardia.     Prelim US: Cephalic, CYNDI: 17.60cm, MVP: 7.68cm, anterior placenta, BPP /.    /72   Wt (!) 139 kg (307 lb)   LMP 2022   BMI 52.70 kg/mý   SVE: na     Fetal Heart Rate: 153     Problems (from 23 to present)       Problem Noted Resolved    Chronic hypertension during pregnancy 2023 by Kathryn Castle APRN No    Overview Addendum 2023 10:56 AM by Kathryn Castle APRN     154/78, 160/77, 146/78  Currently on Procardia XL          Supervision of high risk pregnancy in third trimester 3/21/2023 by Sho Louise MD No    Previous  delivery affecting pregnancy 3/21/2023 by Sho Louise MD No    Overview Signed 3/21/2023  9:12 AM by Sho Louise MD     G1- Failure to progress/failed induction of labor         History of gestational hypertension 3/21/2023 by Sho Louise MD No    Overview Signed 3/21/2023  9:11 AM by Sho Louise MD     Baseline labs ordered         Obesity affecting pregnancy in third trimester 3/21/2023 by Sho Louise MD No    Overview Signed 3/21/2023  9:11 AM by Sho Louise MD     Early glucola ordered                 A/P: Denise Rod is a 28 y.o.  at 33w4d.  - RTC in 1 weeks with BPP for obesity  - RTC in 2 weeks with Growth scan      Diagnosis Plan   1. 33 weeks gestation of pregnancy        2. Supervision of high risk pregnancy in third trimester        3. Previous  delivery affecting pregnancy        4. History of gestational hypertension        5. Obesity affecting pregnancy in third trimester        6. Chronic hypertension during pregnancy          IRIS Mason  2023  10:44 CDT

## 2023-09-05 ENCOUNTER — ROUTINE PRENATAL (OUTPATIENT)
Dept: OBSTETRICS AND GYNECOLOGY | Facility: CLINIC | Age: 28
End: 2023-09-05
Payer: MEDICAID

## 2023-09-05 VITALS — SYSTOLIC BLOOD PRESSURE: 120 MMHG | BODY MASS INDEX: 53.73 KG/M2 | WEIGHT: 293 LBS | DIASTOLIC BLOOD PRESSURE: 70 MMHG

## 2023-09-05 DIAGNOSIS — O09.93 SUPERVISION OF HIGH RISK PREGNANCY IN THIRD TRIMESTER: ICD-10-CM

## 2023-09-05 DIAGNOSIS — Z87.59 HISTORY OF GESTATIONAL HYPERTENSION: ICD-10-CM

## 2023-09-05 DIAGNOSIS — O99.213 OBESITY AFFECTING PREGNANCY IN THIRD TRIMESTER: ICD-10-CM

## 2023-09-05 DIAGNOSIS — O34.219 PREVIOUS CESAREAN DELIVERY AFFECTING PREGNANCY: ICD-10-CM

## 2023-09-05 DIAGNOSIS — O10.919 CHRONIC HYPERTENSION DURING PREGNANCY: ICD-10-CM

## 2023-09-05 DIAGNOSIS — Z3A.34 34 WEEKS GESTATION OF PREGNANCY: Primary | ICD-10-CM

## 2023-09-05 PROCEDURE — 99214 OFFICE O/P EST MOD 30 MIN: CPT

## 2023-09-05 NOTE — PROGRESS NOTES
CC: Prenatal visit    Denise Rod is a 28 y.o.  at 34w4d.  Doing well.  Denies contractions, LOF, or VB.  Reports good FM.    Prelim US: Cephalic, CYNDI: 16.27cm, MVP: 4.69cm, lt anterior placenta, BPP /.     /70   Wt (!) 142 kg (313 lb)   LMP 2022   BMI 53.73 kg/m²   SVE: na     Fetal Heart Rate: 150us     Problems (from 23 to present)       Problem Noted Resolved    Chronic hypertension during pregnancy 2023 by Kathryn Castle APRN No    Overview Addendum 2023 10:56 AM by Kathryn Castle APRN     154/78, 160/77, 146/78  Currently on Procardia XL          Supervision of high risk pregnancy in third trimester 3/21/2023 by Sho Louise MD No    Previous  delivery affecting pregnancy 3/21/2023 by Sho Louise MD No    Overview Signed 3/21/2023  9:12 AM by Sho Louise MD     G1- Failure to progress/failed induction of labor         History of gestational hypertension 3/21/2023 by Sho Louise MD No    Overview Signed 3/21/2023  9:11 AM by Sho Louise MD     Baseline labs ordered         Obesity affecting pregnancy in third trimester 3/21/2023 by Sho Louise MD No    Overview Signed 3/21/2023  9:11 AM by Sho Louise MD     Early glucola ordered                 A/P: Denise Rod is a 28 y.o.  at 34w4d.  - RTC in 1 weeks bpp growth      Diagnosis Plan   1. 34 weeks gestation of pregnancy        2. Supervision of high risk pregnancy in third trimester        3. Previous  delivery affecting pregnancy        4. History of gestational hypertension        5. Obesity affecting pregnancy in third trimester        6. Chronic hypertension during pregnancy          IRIS Mason  2023  13:34 CDT

## 2023-09-14 ENCOUNTER — ROUTINE PRENATAL (OUTPATIENT)
Dept: OBSTETRICS AND GYNECOLOGY | Facility: CLINIC | Age: 28
End: 2023-09-14
Payer: MEDICAID

## 2023-09-14 VITALS — WEIGHT: 293 LBS | BODY MASS INDEX: 53.21 KG/M2 | SYSTOLIC BLOOD PRESSURE: 126 MMHG | DIASTOLIC BLOOD PRESSURE: 82 MMHG

## 2023-09-14 DIAGNOSIS — Z3A.35 35 WEEKS GESTATION OF PREGNANCY: ICD-10-CM

## 2023-09-14 DIAGNOSIS — O99.213 OBESITY AFFECTING PREGNANCY IN THIRD TRIMESTER: ICD-10-CM

## 2023-09-14 DIAGNOSIS — O34.219 PREVIOUS CESAREAN DELIVERY AFFECTING PREGNANCY: ICD-10-CM

## 2023-09-14 DIAGNOSIS — O10.919 CHRONIC HYPERTENSION DURING PREGNANCY: ICD-10-CM

## 2023-09-14 DIAGNOSIS — O09.93 SUPERVISION OF HIGH RISK PREGNANCY IN THIRD TRIMESTER: Primary | ICD-10-CM

## 2023-09-14 DIAGNOSIS — Z87.59 HISTORY OF GESTATIONAL HYPERTENSION: ICD-10-CM

## 2023-09-14 PROCEDURE — 99214 OFFICE O/P EST MOD 30 MIN: CPT | Performed by: OBSTETRICS & GYNECOLOGY

## 2023-09-14 PROCEDURE — 87653 STREP B DNA AMP PROBE: CPT | Performed by: OBSTETRICS & GYNECOLOGY

## 2023-09-15 PROBLEM — O99.820 GBS (GROUP B STREPTOCOCCUS CARRIER), +RV CULTURE, CURRENTLY PREGNANT: Status: ACTIVE | Noted: 2023-09-15

## 2023-09-15 LAB — GROUP B STREP, DNA: POSITIVE

## 2023-09-15 NOTE — PROGRESS NOTES
CC: Prenatal visit    Denise Rod is a 28 y.o.  at 35w6d.  Doing well.  Denies contractions, LOF, or VB.  Reports good FM.    /82   Wt (!) 141 kg (310 lb)   LMP 2022   BMI 53.21 kg/m²   SVE: Declined     Fetal Heart Rate: 147    Prelim US- EFW 2681g (39%ile) w/ AC 13.5%ile, CYNDI 21.6 cm, cephalic, placenta right anterior, BPP  Problems (from 23 to present)       Problem Noted Resolved    GBS (group B Streptococcus carrier), +RV culture, currently pregnant 9/15/2023 by Sho Louise MD No    Chronic hypertension during pregnancy 2023 by Kathryn Castle APRN No    Overview Addendum 2023 10:56 AM by Kathryn Castle APRN     154/78, 160/77, 146/78  Currently on Procardia XL          Supervision of high risk pregnancy in third trimester 3/21/2023 by Sho Louise MD No    Previous  delivery affecting pregnancy 3/21/2023 by Sho Louise MD No    Overview Signed 3/21/2023  9:12 AM by Sho Louise MD     G1- Failure to progress/failed induction of labor         History of gestational hypertension 3/21/2023 by Sho Louise MD No    Overview Signed 3/21/2023  9:11 AM by Sho Louise MD     Baseline labs ordered         Obesity affecting pregnancy in third trimester 3/21/2023 by Sho Louise MD No    Overview Signed 3/21/2023  9:11 AM by Sho Louise MD     Early glucola ordered               A/P: Denise Rod is a 28 y.o.  at 35w6d.  - RTC in 1 week w/ BPP  - RLTCS scheduled at 38 wks     Diagnosis Plan   1. Supervision of high risk pregnancy in third trimester        2. Previous  delivery affecting pregnancy        3. History of gestational hypertension        4. Obesity affecting pregnancy in third trimester        5. Chronic hypertension during pregnancy        6. 35 weeks gestation of pregnancy   Group B Strep (Molecular) - Swab, Vaginal/Rectum        Sho Louise MD  9/15/2023  16:07 CDT

## 2023-09-15 NOTE — H&P (VIEW-ONLY)
[facility]  HISTORY & PHYSICAL - Obstetrics    Name: Denise Rod  MRN: [id number]  Location: Room/bed info not found  Date: 2023   [facility]: [id number]      CHIEF COMPLAINT:  section    HISTORY OF PRESENT ILLNESS  Denise Rod is a 28 y.o.  at 36w0d who is scheduled for a  section at 38w0d secondary to CHTN.  Today denies LOF, vaginal bleeding, or contraction.  Reports good FM.    Patient denies any chest pain, palpitations, headaches, lightheadedness, shortness of breath, cough, nausea, vomiting, diarrhea, constipation, fever, or chills.    ROS  Review of Systems   Constitutional: Negative.    HENT: Negative.     Eyes: Negative.    Respiratory: Negative.     Cardiovascular: Negative.    Gastrointestinal: Negative.    Endocrine: Negative.    Genitourinary: Negative.    Musculoskeletal: Negative.    Skin: Negative.    Allergic/Immunologic: Negative.    Neurological: Negative.    Hematological: Negative.    Psychiatric/Behavioral: Negative.       PRENATAL LAB RESULTS  Prenatal labs reviewed.    External Prenatal Results       Pregnancy Outside Results - Transcribed From Office Records - See Scanned Records For Details       Test Value Date Time    ABO  O  23 1020    Rh  Positive  23 1020    Antibody Screen  Negative  23 1020    Varicella IgG       Rubella  <0.90 index 23 1020    Hgb  11.5 g/dL 23 1145       11.7 g/dL 23 1020    Hct  33.8 % 23 1145       35.5 % 23 1020    Glucose Fasting GTT  95 mg/dL 23 0854    Glucose Tolerance Test 1 hour  150 mg/dL 23 1012    Glucose Tolerance Test 3 hour  99 mg/dL 23 1321    Gonorrhea (discrete)  Negative  23 1020    Chlamydia (discrete)  Negative  23 1020    RPR  Non-Reactive  23 1020    VDRL       Syphilis Antibody       HBsAg  Non-Reactive  23 1020    Herpes Simplex Virus PCR       Herpes Simplex VIrus  Culture       HIV  Non-Reactive  23 1020    Hep C RNA Quant PCR       Hep C Antibody  Non-Reactive  23 1020    AFP       Group B Strep  Positive  23 1530    GBS Susceptibility to Clindamycin       GBS Susceptibility to Erythromycin       Fetal Fibronectin       Genetic Testing, Maternal Blood                 Drug Screening       Test Value Date Time    Urine Drug Screen       Amphetamine Screen  Negative  23 1020    Barbiturate Screen  Negative  23 1020    Benzodiazepine Screen  Negative  23 1020    Methadone Screen  Negative  23 1020    Phencyclidine Screen  Negative  23 1020    Opiates Screen  Negative  23 1020    THC Screen  Negative  23 1020    Cocaine Screen       Propoxyphene Screen  Negative  23 1020    Buprenorphine Screen  Negative  23 1020    Methamphetamine Screen       Oxycodone Screen  Negative  23 1020    Tricyclic Antidepressants Screen  Negative  23 1020              Legend    ^: Historical                          PRENATAL RISK FACTORS   Problems (from 23 to present)       Problem Noted Resolved    GBS (group B Streptococcus carrier), +RV culture, currently pregnant 9/15/2023 by Sho Louise MD No    Chronic hypertension during pregnancy 2023 by Kathryn Castle APRN No    Overview Addendum 2023 10:56 AM by Kathryn Castle APRN     154/78, 160/77, 146/78  Currently on Procardia XL          Supervision of high risk pregnancy in third trimester 3/21/2023 by Sho Louise MD No    Previous  delivery affecting pregnancy 3/21/2023 by Sho Louise MD No    Overview Signed 3/21/2023  9:12 AM by Sho Louise MD     G1- Failure to progress/failed induction of labor         History of gestational hypertension 3/21/2023 by Sho Louise MD No    Overview Signed 3/21/2023  9:11 AM by Sho Louise MD      Baseline labs ordered         Obesity affecting pregnancy in third trimester 3/21/2023 by Sho Louise MD No    Overview Signed 3/21/2023  9:11 AM by Sho Louise MD     Early glucola ordered               OB HISTORY  OB History    Para Term  AB Living   2 1 1     1   SAB IAB Ectopic Molar Multiple Live Births           0 1      # Outcome Date GA Lbr Sagar/2nd Weight Sex Delivery Anes PTL Lv   2 Current            1 Term 22 40w2d  3460 g (7 lb 10.1 oz) F CS-LTranv Spinal N EZEQUIEL      Complications: Failure to Progress in First Stage     PAST MEDICAL HISTORY  Past Medical History:   Diagnosis Date    Anxiety     Depression     Gestational hypertension      PAST SURGICAL HISTORY  Past Surgical History:   Procedure Laterality Date     SECTION N/A 2022    Procedure:  SECTION PRIMARY;  Surgeon: Sho Louise MD;  Location: [facility] LABOR DELIVERY;  Service: Obstetrics;  Laterality: N/A;    CHOLECYSTECTOMY WITH INTRAOPERATIVE CHOLANGIOGRAM N/A 2022    Procedure: CHOLECYSTECTOMY LAPAROSCOPIC INTRAOPERATIVE CHOLANGIOGRAM;  Surgeon: Jeffry Witt MD;  Location: [facility] OR;  Service: General;  Laterality: N/A;    ERCP N/A 11/15/2022    Procedure: ENDOSCOPIC RETROGRADE CHOLANGIOPANCREATOGRAPHY 11:00;  Surgeon: Tyrone Louise DO;  Location: [facility] ENDOSCOPY;  Service: Gastroenterology;  Laterality: N/A;    ERCP WITH STENT PLACEMENT N/A 2022    Procedure: ENDOSCOPIC RETROGRADE CHOLANGIOPANCREATOGRAPHY WITH STENT PLACEMENT;  Surgeon: Tyrone Louise DO;  Location: [facility] ENDOSCOPY;  Service: Gastroenterology;  Laterality: N/A;    KNEE CARTILAGE SURGERY Left      FAMILY HISTORY  Family History   Problem Relation Age of Onset    Irregular heart beat Mother     No Known Problems Daughter     Hypertension Maternal Grandmother     Diabetes Maternal Grandmother     Depression Maternal Grandmother      SOCIAL HISTORY  Social  History     Socioeconomic History    Marital status:    Tobacco Use    Smoking status: Never    Smokeless tobacco: Never   Vaping Use    Vaping Use: Never used   Substance and Sexual Activity    Alcohol use: Not Currently     Comment: occasionally    Drug use: Never    Sexual activity: Yes     Partners: Male     Comment: last pap smear 21 negative at [place]      ALLERGIES  No Known Allergies    HOME MEDICATIONS  Prior to Admission medications    Medication Sig Start Date End Date Taking? Authorizing Provider   NIFEdipine XL (Procardia XL) 30 MG 24 hr tablet Take 1 tablet by mouth Daily. 23  Yes Sho Louise MD   prenatal vitamin (prenatal, CLASSIC, vitamin) tablet Take  by mouth Daily.   Yes Provider, MD Fan   promethazine (PHENERGAN) 12.5 MG tablet Take 1 tablet by mouth Every 6 (Six) Hours As Needed for Nausea or Vomiting. 3/21/23  Yes Sho Louise MD     PHYSICAL EXAM  /82   Wt (!) 141 kg (310 lb)   LMP 2022   BMI 53.21 kg/m²   General: No acute distress.  Well developed, well nourished.  Pleasant.  Heart: Regular rate and rhythm.  No murmurs, rubs, or gallops.  Lungs: Clear to auscultation bilaterally.  No wheezes, rales, or rhonchi.  Abdomen: Soft, nontender to palpation, enlarged by gravid uterus.  Extremities: Mild edema noted bilaterally.    JORGE L Rod is a 28 y.o.  scheduled for RLTCS and B/L salpingectomy at 38w0d secondary to CHTN.    PLAN  1.  LTCS  - Admit: Labor and Delivery  - Attending: Dr. Sho Louise  - Condition: Stable  - Vitals: per protocol  - Activity: ad evangelista  - Nursing: NST prior to surgery  - Diet: NPO  - IV fluids:  mL/hr  - Allergies: NKDA  - Labs: CBC, T&S, UDS  - GBS: POS.  Antibiotics not indicated.  - Ancef 3 g prior to skin incision  - Patient consented for  section.  Reviewed risks and benefits to include injury to surrounding organs (bowel, bladder,  ureters, blood vessels, nerves, baby), infection, bleeding (possibly requiring blood transfusion and/or hysterectomy), and maternal/fetal death.   - Denise Rod and I have discussed pain goals for this hospitalization after reviewing her current clinical condition, medical history and prior pain experiences.  The goal is to keep her pain level appropriate.  To help achieve this, schedule Tylenol and NSAIDS, +/- Duramorph or PCA.    This document has been electronically signed by Sho Louise MD on September 15, 2023 16:08 CDT.

## 2023-09-15 NOTE — H&P
[facility]  HISTORY & PHYSICAL - Obstetrics    Name: Denise Rod  MRN: [id number]  Location: Room/bed info not found  Date: 2023   [facility]: [id number]      CHIEF COMPLAINT:  section    HISTORY OF PRESENT ILLNESS  Denise Rod is a 28 y.o.  at 36w0d who is scheduled for a  section at 38w0d secondary to CHTN.  Today denies LOF, vaginal bleeding, or contraction.  Reports good FM.    Patient denies any chest pain, palpitations, headaches, lightheadedness, shortness of breath, cough, nausea, vomiting, diarrhea, constipation, fever, or chills.    ROS  Review of Systems   Constitutional: Negative.    HENT: Negative.     Eyes: Negative.    Respiratory: Negative.     Cardiovascular: Negative.    Gastrointestinal: Negative.    Endocrine: Negative.    Genitourinary: Negative.    Musculoskeletal: Negative.    Skin: Negative.    Allergic/Immunologic: Negative.    Neurological: Negative.    Hematological: Negative.    Psychiatric/Behavioral: Negative.       PRENATAL LAB RESULTS  Prenatal labs reviewed.    External Prenatal Results       Pregnancy Outside Results - Transcribed From Office Records - See Scanned Records For Details       Test Value Date Time    ABO  O  23 1020    Rh  Positive  23 1020    Antibody Screen  Negative  23 1020    Varicella IgG       Rubella  <0.90 index 23 1020    Hgb  11.5 g/dL 23 1145       11.7 g/dL 23 1020    Hct  33.8 % 23 1145       35.5 % 23 1020    Glucose Fasting GTT  95 mg/dL 23 0854    Glucose Tolerance Test 1 hour  150 mg/dL 23 1012    Glucose Tolerance Test 3 hour  99 mg/dL 23 1321    Gonorrhea (discrete)  Negative  23 1020    Chlamydia (discrete)  Negative  23 1020    RPR  Non-Reactive  23 1020    VDRL       Syphilis Antibody       HBsAg  Non-Reactive  23 1020    Herpes Simplex Virus PCR       Herpes Simplex VIrus  Culture       HIV  Non-Reactive  23 1020    Hep C RNA Quant PCR       Hep C Antibody  Non-Reactive  23 1020    AFP       Group B Strep  Positive  23 1530    GBS Susceptibility to Clindamycin       GBS Susceptibility to Erythromycin       Fetal Fibronectin       Genetic Testing, Maternal Blood                 Drug Screening       Test Value Date Time    Urine Drug Screen       Amphetamine Screen  Negative  23 1020    Barbiturate Screen  Negative  23 1020    Benzodiazepine Screen  Negative  23 1020    Methadone Screen  Negative  23 1020    Phencyclidine Screen  Negative  23 1020    Opiates Screen  Negative  23 1020    THC Screen  Negative  23 1020    Cocaine Screen       Propoxyphene Screen  Negative  23 1020    Buprenorphine Screen  Negative  23 1020    Methamphetamine Screen       Oxycodone Screen  Negative  23 1020    Tricyclic Antidepressants Screen  Negative  23 1020              Legend    ^: Historical                          PRENATAL RISK FACTORS   Problems (from 23 to present)       Problem Noted Resolved    GBS (group B Streptococcus carrier), +RV culture, currently pregnant 9/15/2023 by Sho Louise MD No    Chronic hypertension during pregnancy 2023 by Kathryn Castle APRN No    Overview Addendum 2023 10:56 AM by Kathryn Castle APRN     154/78, 160/77, 146/78  Currently on Procardia XL          Supervision of high risk pregnancy in third trimester 3/21/2023 by Sho Louise MD No    Previous  delivery affecting pregnancy 3/21/2023 by Sho Louise MD No    Overview Signed 3/21/2023  9:12 AM by Sho Louise MD     G1- Failure to progress/failed induction of labor         History of gestational hypertension 3/21/2023 by Sho Louise MD No    Overview Signed 3/21/2023  9:11 AM by Sho Louise MD      Baseline labs ordered         Obesity affecting pregnancy in third trimester 3/21/2023 by Sho Louise MD No    Overview Signed 3/21/2023  9:11 AM by Sho Louise MD     Early glucola ordered               OB HISTORY  OB History    Para Term  AB Living   2 1 1     1   SAB IAB Ectopic Molar Multiple Live Births           0 1      # Outcome Date GA Lbr Sagar/2nd Weight Sex Delivery Anes PTL Lv   2 Current            1 Term 22 40w2d  3460 g (7 lb 10.1 oz) F CS-LTranv Spinal N EZEQUIEL      Complications: Failure to Progress in First Stage     PAST MEDICAL HISTORY  Past Medical History:   Diagnosis Date    Anxiety     Depression     Gestational hypertension      PAST SURGICAL HISTORY  Past Surgical History:   Procedure Laterality Date     SECTION N/A 2022    Procedure:  SECTION PRIMARY;  Surgeon: Sho Louise MD;  Location: [facility] LABOR DELIVERY;  Service: Obstetrics;  Laterality: N/A;    CHOLECYSTECTOMY WITH INTRAOPERATIVE CHOLANGIOGRAM N/A 2022    Procedure: CHOLECYSTECTOMY LAPAROSCOPIC INTRAOPERATIVE CHOLANGIOGRAM;  Surgeon: Jeffry Witt MD;  Location: [facility] OR;  Service: General;  Laterality: N/A;    ERCP N/A 11/15/2022    Procedure: ENDOSCOPIC RETROGRADE CHOLANGIOPANCREATOGRAPHY 11:00;  Surgeon: Tyrone Louise DO;  Location: [facility] ENDOSCOPY;  Service: Gastroenterology;  Laterality: N/A;    ERCP WITH STENT PLACEMENT N/A 2022    Procedure: ENDOSCOPIC RETROGRADE CHOLANGIOPANCREATOGRAPHY WITH STENT PLACEMENT;  Surgeon: Tyrone Louise DO;  Location: [facility] ENDOSCOPY;  Service: Gastroenterology;  Laterality: N/A;    KNEE CARTILAGE SURGERY Left      FAMILY HISTORY  Family History   Problem Relation Age of Onset    Irregular heart beat Mother     No Known Problems Daughter     Hypertension Maternal Grandmother     Diabetes Maternal Grandmother     Depression Maternal Grandmother      SOCIAL HISTORY  Social  History     Socioeconomic History    Marital status:    Tobacco Use    Smoking status: Never    Smokeless tobacco: Never   Vaping Use    Vaping Use: Never used   Substance and Sexual Activity    Alcohol use: Not Currently     Comment: occasionally    Drug use: Never    Sexual activity: Yes     Partners: Male     Comment: last pap smear 21 negative at [place]      ALLERGIES  No Known Allergies    HOME MEDICATIONS  Prior to Admission medications    Medication Sig Start Date End Date Taking? Authorizing Provider   NIFEdipine XL (Procardia XL) 30 MG 24 hr tablet Take 1 tablet by mouth Daily. 23  Yes Sho Louise MD   prenatal vitamin (prenatal, CLASSIC, vitamin) tablet Take  by mouth Daily.   Yes Provider, MD Fan   promethazine (PHENERGAN) 12.5 MG tablet Take 1 tablet by mouth Every 6 (Six) Hours As Needed for Nausea or Vomiting. 3/21/23  Yes Sho Louise MD     PHYSICAL EXAM  /82   Wt (!) 141 kg (310 lb)   LMP 2022   BMI 53.21 kg/m²   General: No acute distress.  Well developed, well nourished.  Pleasant.  Heart: Regular rate and rhythm.  No murmurs, rubs, or gallops.  Lungs: Clear to auscultation bilaterally.  No wheezes, rales, or rhonchi.  Abdomen: Soft, nontender to palpation, enlarged by gravid uterus.  Extremities: Mild edema noted bilaterally.    JORGE L Rod is a 28 y.o.  scheduled for RLTCS and B/L salpingectomy at 38w0d secondary to CHTN.    PLAN  1.  LTCS  - Admit: Labor and Delivery  - Attending: Dr. Sho Louise  - Condition: Stable  - Vitals: per protocol  - Activity: ad evangelista  - Nursing: NST prior to surgery  - Diet: NPO  - IV fluids:  mL/hr  - Allergies: NKDA  - Labs: CBC, T&S, UDS  - GBS: POS.  Antibiotics not indicated.  - Ancef 3 g prior to skin incision  - Patient consented for  section.  Reviewed risks and benefits to include injury to surrounding organs (bowel, bladder,  ureters, blood vessels, nerves, baby), infection, bleeding (possibly requiring blood transfusion and/or hysterectomy), and maternal/fetal death.   - Denise Rod and I have discussed pain goals for this hospitalization after reviewing her current clinical condition, medical history and prior pain experiences.  The goal is to keep her pain level appropriate.  To help achieve this, schedule Tylenol and NSAIDS, +/- Duramorph or PCA.    This document has been electronically signed by Sho Louise MD on September 15, 2023 16:08 CDT.

## 2023-09-20 ENCOUNTER — PATIENT OUTREACH (OUTPATIENT)
Dept: LABOR AND DELIVERY | Facility: HOSPITAL | Age: 28
End: 2023-09-20
Payer: MEDICAID

## 2023-09-20 NOTE — OUTREACH NOTE
[facility]  Check-In    Current Estimated Gestational Age: 36w5d        My Chart correspondence sent to [facility] today.  Will reach out again for postpartum home call.        Angelique Melgoza RN  Maternity Nurse Navigator    9/20/2023, 09:47 CDT

## 2023-09-21 ENCOUNTER — ROUTINE PRENATAL (OUTPATIENT)
Dept: OBSTETRICS AND GYNECOLOGY | Facility: CLINIC | Age: 28
End: 2023-09-21
Payer: MEDICAID

## 2023-09-21 VITALS — SYSTOLIC BLOOD PRESSURE: 124 MMHG | DIASTOLIC BLOOD PRESSURE: 78 MMHG | WEIGHT: 293 LBS | BODY MASS INDEX: 53.73 KG/M2

## 2023-09-21 DIAGNOSIS — O99.213 OBESITY AFFECTING PREGNANCY IN THIRD TRIMESTER: ICD-10-CM

## 2023-09-21 DIAGNOSIS — O99.820 GBS (GROUP B STREPTOCOCCUS CARRIER), +RV CULTURE, CURRENTLY PREGNANT: ICD-10-CM

## 2023-09-21 DIAGNOSIS — O10.919 CHRONIC HYPERTENSION DURING PREGNANCY: ICD-10-CM

## 2023-09-21 DIAGNOSIS — O34.219 PREVIOUS CESAREAN DELIVERY AFFECTING PREGNANCY: ICD-10-CM

## 2023-09-21 DIAGNOSIS — O09.93 SUPERVISION OF HIGH RISK PREGNANCY IN THIRD TRIMESTER: Primary | ICD-10-CM

## 2023-09-21 DIAGNOSIS — Z87.59 HISTORY OF GESTATIONAL HYPERTENSION: ICD-10-CM

## 2023-09-21 PROCEDURE — 99214 OFFICE O/P EST MOD 30 MIN: CPT

## 2023-09-21 NOTE — PROGRESS NOTES
CC: Prenatal visit    Denise Rod is a 28 y.o.  at 36w6d.  Doing well.  Denies contractions, LOF, or VB.  Reports good FM.    Prelim US:  Cephalic, CYNDI: 23.01cm, MVP: 7.41cm, anterior, BPP 8/8.    /78   Wt (!) 142 kg (313 lb)   LMP 2022   BMI 53.73 kg/m²   SVE: na     Fetal Heart Rate: 152     Problems (from 23 to present)       Problem Noted Resolved    GBS (group B Streptococcus carrier), +RV culture, currently pregnant 9/15/2023 by Sho Louise MD No    Chronic hypertension during pregnancy 2023 by Kathryn Castle APRN No    Overview Addendum 2023 10:56 AM by Kathryn Caslte APRN     154/78, 160/77, 146/78  Currently on Procardia XL          Supervision of high risk pregnancy in third trimester 3/21/2023 by Sho Louise MD No    Previous  delivery affecting pregnancy 3/21/2023 by Sho Louise MD No    Overview Signed 3/21/2023  9:12 AM by Sho Louise MD     G1- Failure to progress/failed induction of labor         History of gestational hypertension 3/21/2023 by Sho Louise MD No    Overview Signed 3/21/2023  9:11 AM by Sho Louise MD     Baseline labs ordered         Obesity affecting pregnancy in third trimester 3/21/2023 by Sho Louise MD No    Overview Signed 3/21/2023  9:11 AM by Sho Louise MD     Early glucola ordered                 A/P: Denise Rod is a 28 y.o.  at 36w6d.  - RTC in 1 weeks with BPP   - Scheduled for RCS     Diagnosis Plan   1. Supervision of high risk pregnancy in third trimester        2. Chronic hypertension during pregnancy  US Fetal Biophysical Profile;Without Non-Stress Testing      3. Previous  delivery affecting pregnancy        4. History of gestational hypertension        5. Obesity affecting pregnancy in third trimester        6. GBS (group B  Streptococcus carrier), +RV culture, currently pregnant          Kathryn Torrestes, APRN  9/21/2023  10:03 CDT

## 2023-09-27 ENCOUNTER — ANESTHESIA EVENT (OUTPATIENT)
Dept: LABOR AND DELIVERY | Facility: HOSPITAL | Age: 28
End: 2023-09-27
Payer: MEDICAID

## 2023-09-28 ENCOUNTER — ROUTINE PRENATAL (OUTPATIENT)
Dept: OBSTETRICS AND GYNECOLOGY | Facility: CLINIC | Age: 28
End: 2023-09-28
Payer: MEDICAID

## 2023-09-28 VITALS — DIASTOLIC BLOOD PRESSURE: 80 MMHG | BODY MASS INDEX: 54.07 KG/M2 | SYSTOLIC BLOOD PRESSURE: 122 MMHG | WEIGHT: 293 LBS

## 2023-09-28 DIAGNOSIS — O10.919 CHRONIC HYPERTENSION DURING PREGNANCY: ICD-10-CM

## 2023-09-28 DIAGNOSIS — O34.219 PREVIOUS CESAREAN DELIVERY AFFECTING PREGNANCY: ICD-10-CM

## 2023-09-28 DIAGNOSIS — Z3A.37 37 WEEKS GESTATION OF PREGNANCY: Primary | ICD-10-CM

## 2023-09-28 DIAGNOSIS — Z87.59 HISTORY OF GESTATIONAL HYPERTENSION: ICD-10-CM

## 2023-09-28 DIAGNOSIS — O99.820 GBS (GROUP B STREPTOCOCCUS CARRIER), +RV CULTURE, CURRENTLY PREGNANT: ICD-10-CM

## 2023-09-28 DIAGNOSIS — O99.213 OBESITY AFFECTING PREGNANCY IN THIRD TRIMESTER: ICD-10-CM

## 2023-09-28 DIAGNOSIS — O09.93 SUPERVISION OF HIGH RISK PREGNANCY IN THIRD TRIMESTER: ICD-10-CM

## 2023-09-28 PROCEDURE — 99214 OFFICE O/P EST MOD 30 MIN: CPT

## 2023-09-28 PROCEDURE — 85027 COMPLETE CBC AUTOMATED: CPT | Performed by: OBSTETRICS & GYNECOLOGY

## 2023-09-28 PROCEDURE — 86850 RBC ANTIBODY SCREEN: CPT | Performed by: OBSTETRICS & GYNECOLOGY

## 2023-09-28 PROCEDURE — 86900 BLOOD TYPING SEROLOGIC ABO: CPT | Performed by: OBSTETRICS & GYNECOLOGY

## 2023-09-28 PROCEDURE — 80307 DRUG TEST PRSMV CHEM ANLYZR: CPT | Performed by: OBSTETRICS & GYNECOLOGY

## 2023-09-28 PROCEDURE — 82565 ASSAY OF CREATININE: CPT | Performed by: OBSTETRICS & GYNECOLOGY

## 2023-09-28 PROCEDURE — 86901 BLOOD TYPING SEROLOGIC RH(D): CPT | Performed by: OBSTETRICS & GYNECOLOGY

## 2023-09-28 NOTE — PROGRESS NOTES
CC: Prenatal visit    Denise Rod is a 28 y.o.  at 37w6d.  Doing well.  Denies contractions, LOF, or VB.  Reports good FM.    Prelim US: Cephalic, Anahy: 18.14cm, MVP: 5.70cm, Anterior placenta, BPP /    /80   Wt (!) 143 kg (315 lb)   LMP 2022   BMI 54.07 kg/m²   SVE: na     Fetal Heart Rate: 144us     Problems (from 23 to present)       Problem Noted Resolved    GBS (group B Streptococcus carrier), +RV culture, currently pregnant 9/15/2023 by Sho Louise MD No    Chronic hypertension during pregnancy 2023 by Kathryn Castle APRN No    Overview Addendum 2023 10:56 AM by Kathryn Castle APRN     154/78, 160/77, 146/78  Currently on Procardia XL          Supervision of high risk pregnancy in third trimester 3/21/2023 by Sho Louise MD No    Previous  delivery affecting pregnancy 3/21/2023 by Sho Louise MD No    Overview Signed 3/21/2023  9:12 AM by Sho Louise MD     G1- Failure to progress/failed induction of labor         History of gestational hypertension 3/21/2023 by Sho Louise MD No    Overview Signed 3/21/2023  9:11 AM by Sho Louise MD     Baseline labs ordered         Obesity affecting pregnancy in third trimester 3/21/2023 by Sho Louise MD No    Overview Signed 3/21/2023  9:11 AM by Sho Luoise MD     Early glucola ordered                 A/P: Denise Rod is a 28 y.o.  at 37w6d.  - [facility] tomorrow.      Diagnosis Plan   1. 37 weeks gestation of pregnancy        2. Supervision of high risk pregnancy in third trimester        3. Previous  delivery affecting pregnancy        4. History of gestational hypertension        5. Obesity affecting pregnancy in third trimester        6. Chronic hypertension during pregnancy        7. GBS (group B Streptococcus carrier), +RV culture,  currently pregnant          Kathryn Castle, APRN  9/28/2023  13:43 CDT

## 2023-09-29 ENCOUNTER — ANESTHESIA (OUTPATIENT)
Dept: LABOR AND DELIVERY | Facility: HOSPITAL | Age: 28
End: 2023-09-29
Payer: MEDICAID

## 2023-09-29 ENCOUNTER — HOSPITAL ENCOUNTER (INPATIENT)
Facility: HOSPITAL | Age: 28
LOS: 1 days | Discharge: STILL ADMITTED OR ADMINISTRATIVE | End: 2023-09-30
Attending: OBSTETRICS & GYNECOLOGY | Admitting: OBSTETRICS & GYNECOLOGY
Payer: MEDICAID

## 2023-09-29 DIAGNOSIS — O10.919 CHRONIC HYPERTENSION DURING PREGNANCY: ICD-10-CM

## 2023-09-29 LAB
ABO GROUP BLD: NORMAL
AMPHET+METHAMPHET UR QL: NEGATIVE
AMPHETAMINES UR QL: NEGATIVE
BARBITURATES UR QL SCN: NEGATIVE
BENZODIAZ UR QL SCN: NEGATIVE
BLD GP AB SCN SERPL QL: NEGATIVE
BUPRENORPHINE SERPL-MCNC: NEGATIVE NG/ML
CANNABINOIDS SERPL QL: NEGATIVE
COCAINE UR QL: NEGATIVE
CREAT SERPL-MCNC: 0.54 MG/DL (ref 0.57–1)
DEPRECATED RDW RBC AUTO: 42.4 FL (ref 37–54)
EGFRCR SERPLBLD CKD-EPI 2021: 130.4 ML/MIN/1.73
ERYTHROCYTE [DISTWIDTH] IN BLOOD BY AUTOMATED COUNT: 13.3 % (ref 12.3–15.4)
FENTANYL UR-MCNC: NEGATIVE NG/ML
HCT VFR BLD AUTO: 34.9 % (ref 34–46.6)
HGB BLD-MCNC: 11.8 G/DL (ref 12–15.9)
HOLD SPECIMEN: NORMAL
HOLD SPECIMEN: NORMAL
Lab: NORMAL
MCH RBC QN AUTO: 29.6 PG (ref 26.6–33)
MCHC RBC AUTO-ENTMCNC: 33.8 G/DL (ref 31.5–35.7)
MCV RBC AUTO: 87.7 FL (ref 79–97)
METHADONE UR QL SCN: NEGATIVE
OPIATES UR QL: NEGATIVE
OXYCODONE UR QL SCN: NEGATIVE
PCP UR QL SCN: NEGATIVE
PLATELET # BLD AUTO: 238 10*3/MM3 (ref 140–450)
PMV BLD AUTO: 10.4 FL (ref 6–12)
PROPOXYPH UR QL: NEGATIVE
RBC # BLD AUTO: 3.98 10*6/MM3 (ref 3.77–5.28)
RH BLD: POSITIVE
T&S EXPIRATION DATE: NORMAL
TRICYCLICS UR QL SCN: NEGATIVE
WBC NRBC COR # BLD: 10.03 10*3/MM3 (ref 3.4–10.8)

## 2023-09-29 PROCEDURE — 51703 INSERT BLADDER CATH COMPLEX: CPT

## 2023-09-29 PROCEDURE — 59514 CESAREAN DELIVERY ONLY: CPT | Performed by: SPECIALIST/TECHNOLOGIST, OTHER

## 2023-09-29 PROCEDURE — 25010000002 PHENYLEPHRINE 10 MG/ML SOLUTION

## 2023-09-29 PROCEDURE — 59514 CESAREAN DELIVERY ONLY: CPT | Performed by: OBSTETRICS & GYNECOLOGY

## 2023-09-29 PROCEDURE — 59025 FETAL NON-STRESS TEST: CPT

## 2023-09-29 PROCEDURE — 25010000002 ONDANSETRON PER 1 MG

## 2023-09-29 PROCEDURE — 25010000002 BUPIVACAINE PF 0.75 % SOLUTION

## 2023-09-29 PROCEDURE — 25010000002 KETOROLAC TROMETHAMINE PER 15 MG

## 2023-09-29 PROCEDURE — 25010000002 CEFAZOLIN PER 500 MG: Performed by: OBSTETRICS & GYNECOLOGY

## 2023-09-29 PROCEDURE — 59025 FETAL NON-STRESS TEST: CPT | Performed by: OBSTETRICS & GYNECOLOGY

## 2023-09-29 PROCEDURE — 25010000002 KETOROLAC TROMETHAMINE PER 15 MG: Performed by: OBSTETRICS & GYNECOLOGY

## 2023-09-29 PROCEDURE — 25010000002 ROPIVACAINE PER 1 MG: Performed by: OBSTETRICS & GYNECOLOGY

## 2023-09-29 PROCEDURE — 25010000002 MORPHINE PER 10 MG

## 2023-09-29 RX ORDER — ENOXAPARIN SODIUM 100 MG/ML
60 INJECTION SUBCUTANEOUS EVERY 12 HOURS
Status: DISCONTINUED | OUTPATIENT
Start: 2023-09-30 | End: 2023-10-01 | Stop reason: HOSPADM

## 2023-09-29 RX ORDER — SIMETHICONE 80 MG
80 TABLET,CHEWABLE ORAL 4 TIMES DAILY
Status: DISCONTINUED | OUTPATIENT
Start: 2023-09-29 | End: 2023-10-01 | Stop reason: HOSPADM

## 2023-09-29 RX ORDER — BUPIVACAINE HYDROCHLORIDE 7.5 MG/ML
INJECTION, SOLUTION EPIDURAL; RETROBULBAR
Status: COMPLETED | OUTPATIENT
Start: 2023-09-29 | End: 2023-09-29

## 2023-09-29 RX ORDER — EPHEDRINE SULFATE 50 MG/ML
INJECTION, SOLUTION INTRAVENOUS AS NEEDED
Status: DISCONTINUED | OUTPATIENT
Start: 2023-09-29 | End: 2023-09-29 | Stop reason: SURG

## 2023-09-29 RX ORDER — OXYTOCIN/0.9 % SODIUM CHLORIDE 30/500 ML
125 PLASTIC BAG, INJECTION (ML) INTRAVENOUS CONTINUOUS PRN
Status: DISCONTINUED | OUTPATIENT
Start: 2023-09-29 | End: 2023-10-01 | Stop reason: HOSPADM

## 2023-09-29 RX ORDER — KETOROLAC TROMETHAMINE 30 MG/ML
30 INJECTION, SOLUTION INTRAMUSCULAR; INTRAVENOUS EVERY 6 HOURS
Status: COMPLETED | OUTPATIENT
Start: 2023-09-29 | End: 2023-09-29

## 2023-09-29 RX ORDER — PHENYLEPHRINE HYDROCHLORIDE 10 MG/ML
INJECTION INTRAVENOUS AS NEEDED
Status: DISCONTINUED | OUTPATIENT
Start: 2023-09-29 | End: 2023-09-29 | Stop reason: SURG

## 2023-09-29 RX ORDER — OXYTOCIN/0.9 % SODIUM CHLORIDE 30/500 ML
PLASTIC BAG, INJECTION (ML) INTRAVENOUS
Status: COMPLETED
Start: 2023-09-29 | End: 2023-09-29

## 2023-09-29 RX ORDER — OXYCODONE HYDROCHLORIDE 5 MG/1
5 TABLET ORAL EVERY 4 HOURS PRN
Status: DISCONTINUED | OUTPATIENT
Start: 2023-09-29 | End: 2023-10-01 | Stop reason: HOSPADM

## 2023-09-29 RX ORDER — ALUMINA, MAGNESIA, AND SIMETHICONE 2400; 2400; 240 MG/30ML; MG/30ML; MG/30ML
15 SUSPENSION ORAL EVERY 4 HOURS PRN
Status: DISCONTINUED | OUTPATIENT
Start: 2023-09-29 | End: 2023-10-01 | Stop reason: HOSPADM

## 2023-09-29 RX ORDER — ONDANSETRON 4 MG/1
4 TABLET, FILM COATED ORAL EVERY 8 HOURS PRN
Status: DISCONTINUED | OUTPATIENT
Start: 2023-09-29 | End: 2023-10-01 | Stop reason: HOSPADM

## 2023-09-29 RX ORDER — OXYTOCIN/0.9 % SODIUM CHLORIDE 30/500 ML
999 PLASTIC BAG, INJECTION (ML) INTRAVENOUS ONCE
Status: DISCONTINUED | OUTPATIENT
Start: 2023-09-29 | End: 2023-09-29

## 2023-09-29 RX ORDER — KETOROLAC TROMETHAMINE 30 MG/ML
30 INJECTION, SOLUTION INTRAMUSCULAR; INTRAVENOUS ONCE
Status: DISCONTINUED | OUTPATIENT
Start: 2023-09-29 | End: 2023-09-29

## 2023-09-29 RX ORDER — ONDANSETRON 4 MG/1
4 TABLET, FILM COATED ORAL EVERY 6 HOURS PRN
Status: DISCONTINUED | OUTPATIENT
Start: 2023-09-29 | End: 2023-09-29

## 2023-09-29 RX ORDER — OXYCODONE HYDROCHLORIDE 10 MG/1
10 TABLET ORAL EVERY 4 HOURS PRN
Status: DISCONTINUED | OUTPATIENT
Start: 2023-09-29 | End: 2023-10-01 | Stop reason: HOSPADM

## 2023-09-29 RX ORDER — DOCUSATE SODIUM 100 MG/1
100 CAPSULE, LIQUID FILLED ORAL 2 TIMES DAILY
Status: DISCONTINUED | OUTPATIENT
Start: 2023-09-29 | End: 2023-10-01 | Stop reason: HOSPADM

## 2023-09-29 RX ORDER — ONDANSETRON 2 MG/ML
INJECTION INTRAMUSCULAR; INTRAVENOUS AS NEEDED
Status: DISCONTINUED | OUTPATIENT
Start: 2023-09-29 | End: 2023-09-29 | Stop reason: SURG

## 2023-09-29 RX ORDER — CALCIUM CARBONATE 500 MG/1
1 TABLET, CHEWABLE ORAL EVERY 4 HOURS PRN
Status: DISCONTINUED | OUTPATIENT
Start: 2023-09-29 | End: 2023-10-01 | Stop reason: HOSPADM

## 2023-09-29 RX ORDER — NALTREXONE HYDROCHLORIDE 50 MG/1
25 TABLET, FILM COATED ORAL ONCE
Status: COMPLETED | OUTPATIENT
Start: 2023-09-29 | End: 2023-09-29

## 2023-09-29 RX ORDER — METHYLERGONOVINE MALEATE 0.2 MG/ML
200 INJECTION INTRAVENOUS ONCE AS NEEDED
Status: DISCONTINUED | OUTPATIENT
Start: 2023-09-29 | End: 2023-10-01 | Stop reason: HOSPADM

## 2023-09-29 RX ORDER — KETOROLAC TROMETHAMINE 30 MG/ML
INJECTION, SOLUTION INTRAMUSCULAR; INTRAVENOUS AS NEEDED
Status: DISCONTINUED | OUTPATIENT
Start: 2023-09-29 | End: 2023-09-29 | Stop reason: SURG

## 2023-09-29 RX ORDER — MORPHINE SULFATE 1 MG/ML
INJECTION, SOLUTION EPIDURAL; INTRATHECAL; INTRAVENOUS AS NEEDED
Status: DISCONTINUED | OUTPATIENT
Start: 2023-09-29 | End: 2023-09-29 | Stop reason: SURG

## 2023-09-29 RX ORDER — ROPIVACAINE HYDROCHLORIDE 5 MG/ML
INJECTION, SOLUTION EPIDURAL; INFILTRATION; PERINEURAL AS NEEDED
Status: DISCONTINUED | OUTPATIENT
Start: 2023-09-29 | End: 2023-10-01 | Stop reason: HOSPADM

## 2023-09-29 RX ORDER — SODIUM CHLORIDE 0.9 % (FLUSH) 0.9 %
3 SYRINGE (ML) INJECTION EVERY 12 HOURS SCHEDULED
Status: DISCONTINUED | OUTPATIENT
Start: 2023-09-29 | End: 2023-09-29 | Stop reason: HOSPADM

## 2023-09-29 RX ORDER — ACETAMINOPHEN 500 MG
1000 TABLET ORAL EVERY 6 HOURS
Status: DISPENSED | OUTPATIENT
Start: 2023-09-29 | End: 2023-09-30

## 2023-09-29 RX ORDER — OXYTOCIN/0.9 % SODIUM CHLORIDE 30/500 ML
250 PLASTIC BAG, INJECTION (ML) INTRAVENOUS CONTINUOUS
Status: DISCONTINUED | OUTPATIENT
Start: 2023-09-29 | End: 2023-09-29

## 2023-09-29 RX ORDER — ONDANSETRON 2 MG/ML
4 INJECTION INTRAMUSCULAR; INTRAVENOUS EVERY 6 HOURS PRN
Status: DISCONTINUED | OUTPATIENT
Start: 2023-09-29 | End: 2023-10-01 | Stop reason: HOSPADM

## 2023-09-29 RX ORDER — CITRIC ACID/SODIUM CITRATE 334-500MG
30 SOLUTION, ORAL ORAL ONCE
Status: COMPLETED | OUTPATIENT
Start: 2023-09-29 | End: 2023-09-29

## 2023-09-29 RX ORDER — ACETAMINOPHEN 500 MG
1000 TABLET ORAL ONCE
Status: COMPLETED | OUTPATIENT
Start: 2023-09-29 | End: 2023-09-29

## 2023-09-29 RX ORDER — CARBOPROST TROMETHAMINE 250 UG/ML
250 INJECTION, SOLUTION INTRAMUSCULAR ONCE
Status: DISCONTINUED | OUTPATIENT
Start: 2023-09-29 | End: 2023-10-01 | Stop reason: HOSPADM

## 2023-09-29 RX ORDER — ACETAMINOPHEN 500 MG
1000 TABLET ORAL EVERY 6 HOURS
Status: DISCONTINUED | OUTPATIENT
Start: 2023-09-30 | End: 2023-10-01 | Stop reason: HOSPADM

## 2023-09-29 RX ORDER — MISOPROSTOL 200 UG/1
600 TABLET ORAL ONCE
Status: DISCONTINUED | OUTPATIENT
Start: 2023-09-29 | End: 2023-10-01 | Stop reason: HOSPADM

## 2023-09-29 RX ORDER — POLYETHYLENE GLYCOL 3350 17 G/17G
17 POWDER, FOR SOLUTION ORAL DAILY
Status: DISCONTINUED | OUTPATIENT
Start: 2023-09-29 | End: 2023-10-01 | Stop reason: HOSPADM

## 2023-09-29 RX ORDER — ONDANSETRON 2 MG/ML
4 INJECTION INTRAMUSCULAR; INTRAVENOUS EVERY 6 HOURS PRN
Status: DISCONTINUED | OUTPATIENT
Start: 2023-09-29 | End: 2023-09-29

## 2023-09-29 RX ORDER — HYDROCORTISONE 25 MG/G
CREAM TOPICAL 3 TIMES DAILY PRN
Status: DISCONTINUED | OUTPATIENT
Start: 2023-09-29 | End: 2023-10-01 | Stop reason: HOSPADM

## 2023-09-29 RX ORDER — PRENATAL VIT/IRON FUM/FOLIC AC 27MG-0.8MG
1 TABLET ORAL DAILY
Status: DISCONTINUED | OUTPATIENT
Start: 2023-09-29 | End: 2023-10-01 | Stop reason: HOSPADM

## 2023-09-29 RX ORDER — SODIUM CHLORIDE 0.9 % (FLUSH) 0.9 %
3-10 SYRINGE (ML) INJECTION AS NEEDED
Status: DISCONTINUED | OUTPATIENT
Start: 2023-09-29 | End: 2023-09-29 | Stop reason: HOSPADM

## 2023-09-29 RX ORDER — NIFEDIPINE 30 MG/1
30 TABLET, EXTENDED RELEASE ORAL DAILY
Status: DISCONTINUED | OUTPATIENT
Start: 2023-09-29 | End: 2023-10-01 | Stop reason: HOSPADM

## 2023-09-29 RX ORDER — LIDOCAINE HYDROCHLORIDE 10 MG/ML
5 INJECTION, SOLUTION EPIDURAL; INFILTRATION; INTRACAUDAL; PERINEURAL AS NEEDED
Status: DISCONTINUED | OUTPATIENT
Start: 2023-09-29 | End: 2023-09-29 | Stop reason: HOSPADM

## 2023-09-29 RX ORDER — IBUPROFEN 600 MG/1
600 TABLET ORAL EVERY 6 HOURS
Status: DISCONTINUED | OUTPATIENT
Start: 2023-09-29 | End: 2023-10-01 | Stop reason: HOSPADM

## 2023-09-29 RX ORDER — SODIUM CHLORIDE, SODIUM LACTATE, POTASSIUM CHLORIDE, CALCIUM CHLORIDE 600; 310; 30; 20 MG/100ML; MG/100ML; MG/100ML; MG/100ML
INJECTION, SOLUTION INTRAVENOUS CONTINUOUS PRN
Status: DISCONTINUED | OUTPATIENT
Start: 2023-09-29 | End: 2023-09-29 | Stop reason: SURG

## 2023-09-29 RX ORDER — OXYTOCIN/0.9 % SODIUM CHLORIDE 30/500 ML
PLASTIC BAG, INJECTION (ML) INTRAVENOUS CONTINUOUS PRN
Status: DISCONTINUED | OUTPATIENT
Start: 2023-09-29 | End: 2023-09-29 | Stop reason: SURG

## 2023-09-29 RX ORDER — CEFAZOLIN SODIUM IN 0.9 % NACL 3 G/100 ML
3 INTRAVENOUS SOLUTION, PIGGYBACK (ML) INTRAVENOUS ONCE
Status: COMPLETED | OUTPATIENT
Start: 2023-09-29 | End: 2023-09-29

## 2023-09-29 RX ADMIN — EPHEDRINE SULFATE 10 MG: 50 INJECTION INTRAVENOUS at 07:25

## 2023-09-29 RX ADMIN — SIMETHICONE 80 MG: 80 TABLET, CHEWABLE ORAL at 18:08

## 2023-09-29 RX ADMIN — ACETAMINOPHEN 1000 MG: 500 TABLET, FILM COATED ORAL at 05:54

## 2023-09-29 RX ADMIN — KETOROLAC TROMETHAMINE 30 MG: 30 INJECTION, SOLUTION INTRAMUSCULAR; INTRAVENOUS at 08:28

## 2023-09-29 RX ADMIN — NIFEDIPINE 30 MG: 30 TABLET, FILM COATED, EXTENDED RELEASE ORAL at 09:29

## 2023-09-29 RX ADMIN — BUPIVACAINE HYDROCHLORIDE 1.8 ML: 7.5 INJECTION, SOLUTION EPIDURAL; RETROBULBAR at 07:24

## 2023-09-29 RX ADMIN — IBUPROFEN 600 MG: 600 TABLET, FILM COATED ORAL at 20:36

## 2023-09-29 RX ADMIN — ACETAMINOPHEN 1000 MG: 500 TABLET, FILM COATED ORAL at 12:45

## 2023-09-29 RX ADMIN — PHENYLEPHRINE HYDROCHLORIDE 50 MCG: 10 INJECTION, SOLUTION INTRAVENOUS at 07:37

## 2023-09-29 RX ADMIN — Medication 660 ML/HR: at 07:57

## 2023-09-29 RX ADMIN — Medication 3 G: at 07:29

## 2023-09-29 RX ADMIN — NALTREXONE HYDROCHLORIDE 25 MG: 50 TABLET, FILM COATED ORAL at 10:41

## 2023-09-29 RX ADMIN — Medication 1 APPLICATION: at 12:45

## 2023-09-29 RX ADMIN — SIMETHICONE 80 MG: 80 TABLET, CHEWABLE ORAL at 20:36

## 2023-09-29 RX ADMIN — KETOROLAC TROMETHAMINE 30 MG: 30 INJECTION, SOLUTION INTRAMUSCULAR; INTRAVENOUS at 14:35

## 2023-09-29 RX ADMIN — Medication 0.2 MG: at 07:24

## 2023-09-29 RX ADMIN — ONDANSETRON 8 MG: 2 INJECTION INTRAMUSCULAR; INTRAVENOUS at 06:55

## 2023-09-29 RX ADMIN — SODIUM CHLORIDE, POTASSIUM CHLORIDE, SODIUM LACTATE AND CALCIUM CHLORIDE: 600; 310; 30; 20 INJECTION, SOLUTION INTRAVENOUS at 07:01

## 2023-09-29 RX ADMIN — ACETAMINOPHEN 1000 MG: 500 TABLET, FILM COATED ORAL at 18:08

## 2023-09-29 RX ADMIN — SODIUM CHLORIDE, POTASSIUM CHLORIDE, SODIUM LACTATE AND CALCIUM CHLORIDE: 600; 310; 30; 20 INJECTION, SOLUTION INTRAVENOUS at 08:02

## 2023-09-29 RX ADMIN — SODIUM CHLORIDE, POTASSIUM CHLORIDE, SODIUM LACTATE AND CALCIUM CHLORIDE 1000 ML: 600; 310; 30; 20 INJECTION, SOLUTION INTRAVENOUS at 05:56

## 2023-09-29 RX ADMIN — EPHEDRINE SULFATE 10 MG: 50 INJECTION INTRAVENOUS at 07:31

## 2023-09-29 RX ADMIN — SODIUM CITRATE AND CITRIC ACID MONOHYDRATE 30 ML: 500; 334 SOLUTION ORAL at 07:04

## 2023-09-29 RX ADMIN — DOCUSATE SODIUM 100 MG: 100 CAPSULE, LIQUID FILLED ORAL at 20:36

## 2023-09-29 RX ADMIN — SIMETHICONE 80 MG: 80 TABLET, CHEWABLE ORAL at 12:46

## 2023-09-29 NOTE — NON STRESS TEST
eDnise Rubalcava Nicole Rod, a  at 38w0d with an ARMINDA of 10/13/2023, by Ultrasound, was seen at [facility] for a nonstress test.    No chief complaint on file.      Patient Active Problem List   Diagnosis    Supervision of high risk pregnancy in third trimester    Previous  delivery affecting pregnancy    History of gestational hypertension    Obesity affecting pregnancy in third trimester    Chronic hypertension during pregnancy    GBS (group B Streptococcus carrier), +RV culture, currently pregnant    Single delivery by  section       Start Time:0640  Stop Time:0700    Interpretation A  Nonstress Test Interpretation A: Reactive  Comments A: Digna PAREDES

## 2023-09-29 NOTE — ANESTHESIA PREPROCEDURE EVALUATION
Anesthesia Evaluation     Patient summary reviewed and Nursing notes reviewed   NPO Solid Status: > 8 hours  NPO Liquid Status: > 8 hours           Airway   Mallampati: III  Possible difficult intubation  Dental - normal exam     Pulmonary - negative pulmonary ROS and normal exam   Cardiovascular     Rhythm: regular  Rate: normal    (+) hypertension poorly controlled      Neuro/Psych  (+) psychiatric history Anxiety  GI/Hepatic/Renal/Endo    (+) obesity, morbid obesity    Musculoskeletal (-) negative ROS    Abdominal    Substance History      OB/GYN    (+) Pregnant, pregnancy induced hypertension        Other - negative ROS                       Anesthesia Plan    ASA 3     spinal       Anesthetic plan, risks, benefits, and alternatives have been provided, discussed and informed consent has been obtained with: patient.    Plan discussed with CRNA.      CODE STATUS:

## 2023-09-29 NOTE — INTERVAL H&P NOTE
H&P reviewed. The patient was examined and there are no changes to the H&P. No concerns. NST reactive. Proceed with RLTCS at 38w0d secondary to CHTN. Risks previously discussed.    This document has been electronically signed by Sho Louise MD on September 29, 2023 06:54 CDT.

## 2023-09-29 NOTE — PAYOR COMM NOTE
"[facility]  Case Managment Extender   Olive Baer  (P) [phone]  (F) [phone]        REF#  [id number]   Adrian Rod (28 y.o. Female)       Date of Birth   1995    Social Security Number       Address   [address] [place] [address]    Home Phone   [phone]    MRN   [id number]       Judaism   Taoism    Marital Status                               Admission Date   9/29/23    Admission Type   Elective    Admitting Provider   Sho Louise MD    Attending Provider   Sho Louise MD    Department, Room/Bed   [facility] MOTHER BABY, M755/1       Discharge Date       Discharge Disposition       Discharge Destination                                 Attending Provider: Sho Louise MD    Allergies: No Known Allergies    Isolation: None   Infection: None   Code Status: CPR    Ht: 162.6 cm (64\")   Wt: 143 kg (315 lb)    Admission Cmt: None   Principal Problem: Chronic hypertension during pregnancy [O10.919]                   Active Insurance as of 9/29/2023       Primary Coverage       Payor Plan Insurance Group Employer/Plan Group    ANTHEM MEDICAID ANTHEM MEDICAID [id number]       Payor Plan Address Payor Plan Phone Number Payor Plan Fax Number Effective Dates    [address] [address] [phone]  11/1/2021 - None Entered    [facility] [id number]         Subscriber Name Subscriber Birth Date Member ID       ADRIAN ROD 1995 [id number]                     Emergency Contacts        (Rel.) Home Phone Work Phone Mobile Phone    DENISE AVERY (Spouse) [phone] -- [phone]    HERNANDEZ MISHRA (Grandparent) [phone] -- [phone]    Marina Rod (Mother) [phone] -- [phone]                 History & Physical        Sho Louise MD at 09/29/23 0654          H&P reviewed. The patient was " examined and there are no changes to the H&P. No concerns. NST reactive. Proceed with RLTCS at 38w0d secondary to CHTN. Risks previously discussed.    This document has been electronically signed by Sho Louise MD on 2023 06:54 CDT.    Electronically signed by Sho Louise MD at 23 0655   Source Note          [facility]  HISTORY & PHYSICAL - Obstetrics    Name: Denise Rod  MRN: [id number]  Location: Room/bed info not found  Date: 2023   CSN: [id number]      CHIEF COMPLAINT:  section    HISTORY OF PRESENT ILLNESS  Denise Rod is a 28 y.o.  at 36w0d who is scheduled for a  section at 38w0d secondary to CHTN.  Today denies LOF, vaginal bleeding, or contraction.  Reports good FM.    Patient denies any chest pain, palpitations, headaches, lightheadedness, shortness of breath, cough, nausea, vomiting, diarrhea, constipation, fever, or chills.    ROS  Review of Systems   Constitutional: Negative.    HENT: Negative.     Eyes: Negative.    Respiratory: Negative.     Cardiovascular: Negative.    Gastrointestinal: Negative.    Endocrine: Negative.    Genitourinary: Negative.    Musculoskeletal: Negative.    Skin: Negative.    Allergic/Immunologic: Negative.    Neurological: Negative.    Hematological: Negative.    Psychiatric/Behavioral: Negative.       PRENATAL LAB RESULTS  Prenatal labs reviewed.    External Prenatal Results       Pregnancy Outside Results - Transcribed From Office Records - See Scanned Records For Details       Test Value Date Time    ABO  O  23 1020    Rh  Positive  23 1020    Antibody Screen  Negative  23 1020    Varicella IgG       Rubella  <0.90 index 23 1020    Hgb  11.5 g/dL 23 1145       11.7 g/dL 23 1020    Hct  33.8 % 23 1145       35.5 % 23 1020    Glucose Fasting GTT  95 mg/dL 23 0854    Glucose Tolerance Test 1 hour   150 mg/dL 23 1012    Glucose Tolerance Test 3 hour  99 mg/dL 23 1321    Gonorrhea (discrete)  Negative  23 1020    Chlamydia (discrete)  Negative  23 1020    RPR  Non-Reactive  23 1020    VDRL       Syphilis Antibody       HBsAg  Non-Reactive  23 1020    Herpes Simplex Virus PCR       Herpes Simplex VIrus Culture       HIV  Non-Reactive  23 1020    Hep C RNA Quant PCR       Hep C Antibody  Non-Reactive  23 1020    AFP       Group B Strep  Positive  23 1530    GBS Susceptibility to Clindamycin       GBS Susceptibility to Erythromycin       Fetal Fibronectin       Genetic Testing, Maternal Blood                 Drug Screening       Test Value Date Time    Urine Drug Screen       Amphetamine Screen  Negative  23 1020    Barbiturate Screen  Negative  23 1020    Benzodiazepine Screen  Negative  23 1020    Methadone Screen  Negative  23 1020    Phencyclidine Screen  Negative  23 1020    Opiates Screen  Negative  23 1020    THC Screen  Negative  23 1020    Cocaine Screen       Propoxyphene Screen  Negative  23 1020    Buprenorphine Screen  Negative  23 1020    Methamphetamine Screen       Oxycodone Screen  Negative  23 1020    Tricyclic Antidepressants Screen  Negative  23 1020              Legend    ^: Historical                        PRENATAL RISK FACTORS   Problems (from 23 to present)       Problem Noted Resolved    GBS (group B Streptococcus carrier), +RV culture, currently pregnant 9/15/2023 by Sho Louise MD No    Chronic hypertension during pregnancy 2023 by Kathryn Castle, IRIS No    Overview Addendum 2023 10:56 AM by Kathryn Castle, IRIS     154/78, 160/77, 146/78  Currently on Procardia XL          Supervision of high risk pregnancy in third trimester 3/21/2023 by Sho Louise MD No    Previous  delivery affecting pregnancy 3/21/2023 by  Sho Louise MD No    Overview Signed 3/21/2023  9:12 AM by Sho Louise MD     G1- Failure to progress/failed induction of labor         History of gestational hypertension 3/21/2023 by Sho Louise MD No    Overview Signed 3/21/2023  9:11 AM by Sho Louise MD     Baseline labs ordered         Obesity affecting pregnancy in third trimester 3/21/2023 by Sho Louise MD No    Overview Signed 3/21/2023  9:11 AM by Sho Louise MD     Early glucola ordered             OB HISTORY  OB History    Para Term  AB Living   2 1 1     1   SAB IAB Ectopic Molar Multiple Live Births           0 1      # Outcome Date GA Lbr Sagar/2nd Weight Sex Delivery Anes PTL Lv   2 Current            1 Term 22 40w2d  3460 g (7 lb 10.1 oz) F CS-LTranv Spinal N EZEQUIEL      Complications: Failure to Progress in First Stage   PAST MEDICAL HISTORY  Past Medical History:   Diagnosis Date    Anxiety     Depression     Gestational hypertension    PAST SURGICAL HISTORY  Past Surgical History:   Procedure Laterality Date     SECTION N/A 2022    Procedure:  SECTION PRIMARY;  Surgeon: Sho Louise MD;  Location: [facility] LABOR DELIVERY;  Service: Obstetrics;  Laterality: N/A;    CHOLECYSTECTOMY WITH INTRAOPERATIVE CHOLANGIOGRAM N/A 2022    Procedure: CHOLECYSTECTOMY LAPAROSCOPIC INTRAOPERATIVE CHOLANGIOGRAM;  Surgeon: Jeffry Witt MD;  Location: [facility] OR;  Service: General;  Laterality: N/A;    ERCP N/A 11/15/2022    Procedure: ENDOSCOPIC RETROGRADE CHOLANGIOPANCREATOGRAPHY 11:00;  Surgeon: Tyrone Louise DO;  Location: [facility] ENDOSCOPY;  Service: Gastroenterology;  Laterality: N/A;    ERCP WITH STENT PLACEMENT N/A 2022    Procedure: ENDOSCOPIC RETROGRADE CHOLANGIOPANCREATOGRAPHY WITH STENT PLACEMENT;  Surgeon: Tyrone Louise DO;  Location: [facility] ENDOSCOPY;  Service:  Gastroenterology;  Laterality: N/A;    KNEE CARTILAGE SURGERY Left 2014   FAMILY HISTORY  Family History   Problem Relation Age of Onset    Irregular heart beat Mother     No Known Problems Daughter     Hypertension Maternal Grandmother     Diabetes Maternal Grandmother     Depression Maternal Grandmother    SOCIAL HISTORY  Social History     Socioeconomic History    Marital status:    Tobacco Use    Smoking status: Never    Smokeless tobacco: Never   Vaping Use    Vaping Use: Never used   Substance and Sexual Activity    Alcohol use: Not Currently     Comment: occasionally    Drug use: Never    Sexual activity: Yes     Partners: Male     Comment: last pap smear 21 negative at [employer]    ALLERGIES  No Known Allergies    HOME MEDICATIONS  Prior to Admission medications    Medication Sig Start Date End Date Taking? Authorizing Provider   NIFEdipine XL (Procardia XL) 30 MG 24 hr tablet Take 1 tablet by mouth Daily. 23  Yes Sho Louise MD   prenatal vitamin (prenatal, CLASSIC, vitamin) tablet Take  by mouth Daily.   Yes Provider, MD Fan   promethazine (PHENERGAN) 12.5 MG tablet Take 1 tablet by mouth Every 6 (Six) Hours As Needed for Nausea or Vomiting. 3/21/23  Yes Sho Louise MD   PHYSICAL EXAM  /82   Wt (!) 141 kg (310 lb)   LMP 2022   BMI 53.21 kg/m²   General: No acute distress.  Well developed, well nourished.  Pleasant.  Heart: Regular rate and rhythm.  No murmurs, rubs, or gallops.  Lungs: Clear to auscultation bilaterally.  No wheezes, rales, or rhonchi.  Abdomen: Soft, nontender to palpation, enlarged by gravid uterus.  Extremities: Mild edema noted bilaterally.    IMPRESSION  [facility] Gini Nicole Rod is a 28 y.o.  scheduled for RLTCS and B/L salpingectomy at 38w0d secondary to CHTN.    PLAN  1.  LTCS  - Admit: Labor and Delivery  - Attending: Dr. Sho Louise  - Condition: Stable  - Vitals: per protocol  - Activity: ad  evangelista  - Nursing: NST prior to surgery  - Diet: NPO  - IV fluids:  mL/hr  - Allergies: NKDA  - Labs: CBC, T&S, UDS  - GBS: POS.  Antibiotics not indicated.  - Ancef 3 g prior to skin incision  - Patient consented for  section.  Reviewed risks and benefits to include injury to surrounding organs (bowel, bladder, ureters, blood vessels, nerves, baby), infection, bleeding (possibly requiring blood transfusion and/or hysterectomy), and maternal/fetal death.   - Denise Rod and I have discussed pain goals for this hospitalization after reviewing her current clinical condition, medical history and prior pain experiences.  The goal is to keep her pain level appropriate.  To help achieve this, schedule Tylenol and NSAIDS, +/- Duramorph or PCA.    This document has been electronically signed by Sho Louise MD on September 15, 2023 16:08 CDT.    Electronically signed by Sho Louise MD at 09/15/23 1609                 Sho Louise MD at 23 1415          [facility]  HISTORY & PHYSICAL - Obstetrics    Name: Denise Rod  MRN: [id number]  Location: Room/bed info not found  Date: 2023   [facility]: [id number]      CHIEF COMPLAINT:  section    HISTORY OF PRESENT ILLNESS  Denise Rod is a 28 y.o.  at 36w0d who is scheduled for a  section at 38w0d secondary to CHTN.  Today denies LOF, vaginal bleeding, or contraction.  Reports good FM.    Patient denies any chest pain, palpitations, headaches, lightheadedness, shortness of breath, cough, nausea, vomiting, diarrhea, constipation, fever, or chills.    ROS  Review of Systems   Constitutional: Negative.    HENT: Negative.     Eyes: Negative.    Respiratory: Negative.     Cardiovascular: Negative.    Gastrointestinal: Negative.    Endocrine: Negative.    Genitourinary: Negative.    Musculoskeletal: Negative.    Skin: Negative.     Allergic/Immunologic: Negative.    Neurological: Negative.    Hematological: Negative.    Psychiatric/Behavioral: Negative.       PRENATAL LAB RESULTS  Prenatal labs reviewed.    External Prenatal Results       Pregnancy Outside Results - Transcribed From Office Records - See Scanned Records For Details       Test Value Date Time    ABO  O  02/07/23 1020    Rh  Positive  02/07/23 1020    Antibody Screen  Negative  02/07/23 1020    Varicella IgG       Rubella  <0.90 index 02/07/23 1020    Hgb  11.5 g/dL 07/17/23 1145       11.7 g/dL 02/07/23 1020    Hct  33.8 % 07/17/23 1145       35.5 % 02/07/23 1020    Glucose Fasting GTT  95 mg/dL 07/20/23 0854    Glucose Tolerance Test 1 hour  150 mg/dL 07/20/23 1012    Glucose Tolerance Test 3 hour  99 mg/dL 07/20/23 1321    Gonorrhea (discrete)  Negative  02/07/23 1020    Chlamydia (discrete)  Negative  02/07/23 1020    RPR  Non-Reactive  02/07/23 1020    VDRL       Syphilis Antibody       HBsAg  Non-Reactive  02/07/23 1020    Herpes Simplex Virus PCR       Herpes Simplex VIrus Culture       HIV  Non-Reactive  02/07/23 1020    Hep C RNA Quant PCR       Hep C Antibody  Non-Reactive  02/07/23 1020    AFP       Group B Strep  Positive  09/14/23 1530    GBS Susceptibility to Clindamycin       GBS Susceptibility to Erythromycin       Fetal Fibronectin       Genetic Testing, Maternal Blood                 Drug Screening       Test Value Date Time    Urine Drug Screen       Amphetamine Screen  Negative  02/07/23 1020    Barbiturate Screen  Negative  02/07/23 1020    Benzodiazepine Screen  Negative  02/07/23 1020    Methadone Screen  Negative  02/07/23 1020    Phencyclidine Screen  Negative  02/07/23 1020    Opiates Screen  Negative  02/07/23 1020    THC Screen  Negative  02/07/23 1020    Cocaine Screen       Propoxyphene Screen  Negative  02/07/23 1020    Buprenorphine Screen  Negative  02/07/23 1020    Methamphetamine Screen       Oxycodone Screen  Negative  02/07/23 1020     Tricyclic Antidepressants Screen  Negative  23 1020              Legend    ^: Historical                          PRENATAL RISK FACTORS   Problems (from 23 to present)       Problem Noted Resolved    GBS (group B Streptococcus carrier), +RV culture, currently pregnant 9/15/2023 by Sho Louise MD No    Chronic hypertension during pregnancy 2023 by Kathryn Castle APRN No    Overview Addendum 2023 10:56 AM by Kathryn Castle APRN     154/78, 160/77, 146/78  Currently on Procardia XL          Supervision of high risk pregnancy in third trimester 3/21/2023 by hSo Louise MD No    Previous  delivery affecting pregnancy 3/21/2023 by Sho Louise MD No    Overview Signed 3/21/2023  9:12 AM by Sho Louise MD     G1- Failure to progress/failed induction of labor         History of gestational hypertension 3/21/2023 by Sho Louise MD No    Overview Signed 3/21/2023  9:11 AM by Sho Louise MD     Baseline labs ordered         Obesity affecting pregnancy in third trimester 3/21/2023 by Sho Louise MD No    Overview Signed 3/21/2023  9:11 AM by Sho Louise MD     Early glucola ordered               OB HISTORY  OB History    Para Term  AB Living   2 1 1     1   SAB IAB Ectopic Molar Multiple Live Births           0 1      # Outcome Date GA Lbr Sagar/2nd Weight Sex Delivery Anes PTL Lv   2 Current            1 Term 22 40w2d  3460 g (7 lb 10.1 oz) F CS-LTranv Spinal N EZEQUIEL      Complications: Failure to Progress in First Stage     PAST MEDICAL HISTORY  Past Medical History:   Diagnosis Date    Anxiety     Depression     Gestational hypertension      PAST SURGICAL HISTORY  Past Surgical History:   Procedure Laterality Date     SECTION N/A 2022    Procedure:  SECTION PRIMARY;  Surgeon: Sho Louise MD;   Location: [facility] LABOR DELIVERY;  Service: Obstetrics;  Laterality: N/A;    CHOLECYSTECTOMY WITH INTRAOPERATIVE CHOLANGIOGRAM N/A 09/05/2022    Procedure: CHOLECYSTECTOMY LAPAROSCOPIC INTRAOPERATIVE CHOLANGIOGRAM;  Surgeon: Jeffry Witt MD;  Location: [facility] OR;  Service: General;  Laterality: N/A;    ERCP N/A 11/15/2022    Procedure: ENDOSCOPIC RETROGRADE CHOLANGIOPANCREATOGRAPHY 11:00;  Surgeon: Tyrone Louise DO;  Location: [facility] ENDOSCOPY;  Service: Gastroenterology;  Laterality: N/A;    ERCP WITH STENT PLACEMENT N/A 09/07/2022    Procedure: ENDOSCOPIC RETROGRADE CHOLANGIOPANCREATOGRAPHY WITH STENT PLACEMENT;  Surgeon: Tyrone Louise DO;  Location: [facility] ENDOSCOPY;  Service: Gastroenterology;  Laterality: N/A;    KNEE CARTILAGE SURGERY Left 2014     FAMILY HISTORY  Family History   Problem Relation Age of Onset    Irregular heart beat Mother     No Known Problems Daughter     Hypertension Maternal Grandmother     Diabetes Maternal Grandmother     Depression Maternal Grandmother      SOCIAL HISTORY  Social History     Socioeconomic History    Marital status:    Tobacco Use    Smoking status: Never    Smokeless tobacco: Never   Vaping Use    Vaping Use: Never used   Substance and Sexual Activity    Alcohol use: Not Currently     Comment: occasionally    Drug use: Never    Sexual activity: Yes     Partners: Male     Comment: last pap smear 6/17/21 negative at [place]      ALLERGIES  No Known Allergies    HOME MEDICATIONS  Prior to Admission medications    Medication Sig Start Date End Date Taking? Authorizing Provider   NIFEdipine XL (Procardia XL) 30 MG 24 hr tablet Take 1 tablet by mouth Daily. 6/26/23  Yes Sho Louise MD   prenatal vitamin (prenatal, CLASSIC, vitamin) tablet Take  by mouth Daily.   Yes ProviderFan MD   promethazine (PHENERGAN) 12.5 MG tablet Take 1 tablet by mouth Every 6 (Six) Hours As Needed for Nausea or Vomiting. 3/21/23  Yes Dani  Sho Blanchard MD     PHYSICAL EXAM  /82   Wt (!) 141 kg (310 lb)   LMP 2022   BMI 53.21 kg/m²   General: No acute distress.  Well developed, well nourished.  Pleasant.  Heart: Regular rate and rhythm.  No murmurs, rubs, or gallops.  Lungs: Clear to auscultation bilaterally.  No wheezes, rales, or rhonchi.  Abdomen: Soft, nontender to palpation, enlarged by gravid uterus.  Extremities: Mild edema noted bilaterally.    IMPRESSION  Denise Rod is a 28 y.o.  scheduled for RLTCS and B/L salpingectomy at 38w0d secondary to CHTN.    PLAN  1.  LTCS  - Admit: Labor and Delivery  - Attending: Dr. Sho Louise  - Condition: Stable  - Vitals: per protocol  - Activity: ad evangelista  - Nursing: NST prior to surgery  - Diet: NPO  - IV fluids:  mL/hr  - Allergies: NKDA  - Labs: CBC, T&S, UDS  - GBS: POS.  Antibiotics not indicated.  - Ancef 3 g prior to skin incision  - Patient consented for  section.  Reviewed risks and benefits to include injury to surrounding organs (bowel, bladder, ureters, blood vessels, nerves, baby), infection, bleeding (possibly requiring blood transfusion and/or hysterectomy), and maternal/fetal death.   - Denise Rod and I have discussed pain goals for this hospitalization after reviewing her current clinical condition, medical history and prior pain experiences.  The goal is to keep her pain level appropriate.  To help achieve this, schedule Tylenol and NSAIDS, +/- Duramorph or PCA.    This document has been electronically signed by Sho Louise MD on September 15, 2023 16:08 CDT.    Electronically signed by Sho Louise MD at 09/15/23 5444       Lab Results (last 24 hours)       Procedure Component Value Units Date/Time    Creatinine Serum (kidney function) GFR component [[id number]] Collected: 23 2120    Specimen: Blood Updated: 23 1308    TISSUE EXAM, [facility] ([facility]) [[id number]] Collected:  09/29/23 0745    Specimen: Tissue from Placenta Updated: 09/29/23 0944    Urine Drug Screen - [[id number]]  (Normal) Collected: 09/28/23 2355    Specimen: Urine Updated: 09/29/23 0658     THC, Screen, Urine Negative     Phencyclidine (PCP), Urine Negative     Cocaine Screen, Urine Negative     Methamphetamine, Ur Negative     Opiate Screen Negative     Amphetamine Screen, Urine Negative     Benzodiazepine Screen, Urine Negative     Tricyclic Antidepressants Screen Negative     Methadone Screen, Urine Negative     Barbiturates Screen, Urine Negative     Oxycodone Screen, Urine Negative     Propoxyphene Screen Negative     Buprenorphine, Screen, Urine Negative    Narrative:      Cutoff For Drugs Screened:    Amphetamines               500 ng/ml  Barbiturates               200 ng/ml  Benzodiazepines            150 ng/ml  Cocaine                    150 ng/ml  Methadone                  200 ng/ml  Opiates                    100 ng/ml  Phencyclidine               25 ng/ml  THC                            50 ng/ml  Methamphetamine            500 ng/ml  Tricyclic Antidepressants  300 ng/ml  Oxycodone                  100 ng/ml  Propoxyphene               300 ng/ml  Buprenorphine               10 ng/ml    The normal value for all drugs tested is negative. This report includes unconfirmed screening results, with the cutoff values listed, to be used for medical treatment purposes only.  Unconfirmed results must not be used for non-medical purposes such as employment or legal testing.  Clinical consideration should be applied to any drug of abuse test, particularly when unconfirmed results are used.      Fentanyl, Urine - Urine, Clean Catch [[id number]]  (Normal) Collected: 09/28/23 2355    Specimen: Urine Updated: 09/29/23 0608     Fentanyl, Urine Negative    Narrative:      Negative Threshold:      Fentanyl 5 ng/mL     The normal value for the drug tested is negative. This report includes final unconfirmed screening results to  be used for medical treatment purposes only. Unconfirmed results must not be used for non-medical purposes such as employment or legal testing. Clinical consideration should be applied to any drug of abuse test, particularly when unconfirmed results are used.           Extra Tubes [[id number]] Collected: 23    Specimen: Blood Updated: 23    Narrative:      The following orders were created for panel order Extra Tubes.  Procedure                               Abnormality         Status                     ---------                               -----------         ------                     Red Top[[id number]]                                          Final result               Green Top (Gel)[[id number]]                                  Final result                 Please view results for these tests on the individual orders.    Red Top [[id number]] Collected: 23    Specimen: Blood Updated: 23     Extra Tube Hold for add-ons.     Comment: Auto resulted.       Green Top (Gel) [[id number]] Collected: 23    Specimen: Blood Updated: 23     Extra Tube Hold for add-ons.     Comment: Auto resulted.       CBC (No Diff) [[id number]]  (Abnormal) Collected: 23    Specimen: Blood Updated: 23 0554     WBC 10.03 10*3/mm3      RBC 3.98 10*6/mm3      Hemoglobin 11.8 g/dL      Hematocrit 34.9 %      MCV 87.7 fL      MCH 29.6 pg      MCHC 33.8 g/dL      RDW 13.3 %      RDW-SD 42.4 fl      MPV 10.4 fL      Platelets 238 10*3/mm3           Imaging Results (Last 24 Hours)       ** No results found for the last 24 hours. **          ECG/EMG Results (last 24 hours)       ** No results found for the last 24 hours. **             Operative/Procedure Notes (last 24 hours)        Sho Louise MD at 23 0837          [facility]   Delivery Note    Patient Name: Denise Rod  : 1995  MRN:  [id number]  Date of Delivery: 2023     Diagnosis     Pre & Post-Delivery:  Intrauterine pregnancy at 38w0d  Labor status:  Without Labor     Chronic hypertension during pregnancy    Supervision of high risk pregnancy in third trimester    Previous  delivery affecting pregnancy    History of gestational hypertension    Obesity affecting pregnancy in third trimester    GBS (group B Streptococcus carrier), +RV culture, currently pregnant    Single delivery by  section             Problem List    Transfer to Postpartum     Review the Delivery Report for details.     Delivery     Delivery: , Low Transverse     YOB: 2021    Time of Birth:  Gestational Age 7:55 AM   38w0d     Anesthesia: Spinal     Delivering clinician: Sho Louise    Forceps?   No   Vacuum? No    Shoulder dystocia present: No        Delivery narrative:  See operative report      Infant     Findings: male  infant     Infant observations: Weight: 3000 g (6 lb 9.8 oz)   Length: 18.504  in  Observations/Comments:        Apgars: 8  @ 1 minute /    8  @ 5 minutes   Infant Name: Kaven     Placenta & Cord         Placenta delivered  Spontaneous  at   2023  7:56 AM     Cord: 3 vessels  present.   Nuchal Cord?  no   Cord blood obtained: Yes    Cord gases obtained:  No    Cord gas results: Venous:  No results found for: PHCVEN    Arterial:  No results found for: PHCART     Repair     Episiotomy: None    No    Lacerations: No   Estimated Blood Loss: 700 mL     Quantitative Blood Loss: Quantitative Blood Loss (mL): 603 mL (in OR) (23 0851)        Complications     hypertension    Disposition     Mother to Mother Baby/Postpartum in stable condition currently.  Baby to NICU in stable condition currently.    Sho Louise MD  23  08:55 CDT    Electronically signed by Sho Louise MD at 23 0911       Sho Louise MD at 23 0836           [facility]  Operative Report    Name: Denise Rod  MRN: [id number]  Date: 2023  CSN: [id number]      Location: [facility] LABOR DELIVERY    Service: Obstetrics    Pre-op Diagnosis:   IUP at 38w0d  Prior  section x1  CHTN  Class III obesity, PG BMI 53.4  GBS positive    Post-op Diagnosis: Same, in addition to delivered    Surgeon: Sho Louise MD FACOG    Assistant: Kathy Yeager CST CSFA    Staff:  Circulator: Armani Munguia RN  Scrub Person: Tammi Weaver CSA  L & D Nurse: Chau Disla RN  Assistant: Kathy Yeager CSA  Other: Silver Villanueva RN    Anesthesia: Spinal w/ Duramorph    Anesthesia Staff:  CRNA: Lambert Bliss CRNA  Student Nurse Anesthetist: Mari Gerber SRNA    Operation: Repeat low-transverse  section    Drains: Dawson catheter, draining clear yellow urine    Complications: None    Findings: Obese abdomen; subcutaneous layer approximately 5 cm deep.  Fascia minimally scarred.  Delivery of viable male  weighing 3000 grams with Apgars of 8 and 8.  Bilateral fallopian tubes and ovaries normal.      Condition: Stable    Specimens/Disposition: Placenta and umbilical cord- to pathology    Estimated Blood Loss: 700 mL  Quantitative Blood Loss: 606 mL  IV Fluids: 1300 mL crystalloid  Urine Output: 35 mL    Indications: Denise Rod, 28 y.o.,  at 38w0d presenting for  delivery secondary to CHTN.  Declined TOLAC.    Description of Operation:  The patient was identified and the procedure verified as a  section delivery.  The patient was given spinal anesthesia.  Patient prepared and draped in normal sterile fashion in dorsal supine position with a leftward tilt.  A transverse skin incision was made with the scalpel and carried down through the subcutaneous tissue to the fascia using the Bovie.  Fascial incision was made with the Bovie and extended transversely with Mayorga scissors.   The superior aspect of the fascia was grasped with Kocher clamps and the rectus muscle was dissected off bluntly and sharply with Bovie.  The inferior aspect of the fascia was then grasped with Kocher clamps and the rectus muscle and pyramidalis were dissected off bluntly and then sharply with Bovie.  The rectus muscle was then  in the midline and the peritoneum was identified and entered bluntly.  The peritoneal incision was extended transversely.  Bladder blade and retractor were inserted.  The uterovesical peritoneal reflection was identified and incised transversely with Metzenbaum scissors.  The bladder flap was bluntly freed from the lower uterine segment. The bladder blade was adjusted.  A low transverse uterine incision was made with a scalpel and the uterine incision was extended with upward traction.  The amniotic sac was ruptured with an Allis clamp.  Delivered from cephalic presentation was a live male  weighing 3000 grams with Apgar scores of 8 at one minute and 8 at five minutes.  Cord clamped and cut and infant with bulb suction were handed to awaiting staff.  Cord blood was obtained and sent.  The placenta was removed intact and appeared normal.  Uterus exteriorized and cleared of all clots and debris.  The uterus, tubes and ovaries appeared normal.  The uterine incision was closed with running locked sutures of 0-Vicryl and imbricated with 0-Vicryl.  Hemostasis achieved 2-0 Vicryl interrupted sutures.  Posterior cul-de-sac was cleared.  Uterus was reinserted atraumatically.  Hemostasis was observed.  Bilateral paracolic gutters cleared.  Prior to closure of the abdomen, bilateral transversus abdominis planus block was performed with 15 mL of 0.5% ropivacaine on each side under direct visualization.  Inspection of the abdomen and pelvis prior to abdominal wall closure revealed no evidence of retained instruments or sponges.  The fascia was then reapproximated with running sutures of  0-Vicryl.  Subcutaneous layer closed with 2-0 plain gut in multiple layers.  The skin was reapproximated with 3-0 Monocryl in subcuticular fashion.  Exofin dressing applied.  There were no intraoperative complications and patient tolerated the procedure well.  The patient was escorted to her room in stable condition.    The patient received Ancef 3 g for antibiotic prophylaxis prior to the start of the procedure.    ANGEL Esposito was responsible for performing the following activities: Retraction, Suturing, Closing, Placing Dressing, and Delivery of Fetus and their skilled assistance was necessary for the success of this case.    This document has been electronically signed by Sho Louise MD on September 29, 2023 08:36 CDT.    Electronically signed by Sho Louise MD at 09/29/23 0910       Physician Progress Notes (last 24 hours)  Notes from 09/28/23 1310 through 09/29/23 1310   No notes of this type exist for this encounter.       Medical Student Notes (last 24 hours)  Notes from 09/28/23 1310 through 09/29/23 1310   No notes of this type exist for this encounter.       Consult Notes (last 24 hours)  Notes from 09/28/23 1310 through 09/29/23 1310   No notes of this type exist for this encounter.

## 2023-09-29 NOTE — LACTATION NOTE
"Breastfeeding education reviewed in \"Your Guide to Postpartum & [place] [facility]\".  This includes but not limited to;   breastfeeding benefits  ,exclusive breastfeeding   ,supplement with formula  ,making milk,   ,getting ready  , getting in position  , latching  , day 1-4  feeding patterns  , cluster feeding  , how often will my baby eat  , signs your baby is getting enough  , stomach size  , common concerns (sleepy baby, burping, growth spurts, engorgement, blocked ducts, mastits, sore nipples, alcohol, smoking/vaping, marijuana, medication/drugs)  ,expressing breast milk  ,breast pumps  ,breast milk storage  ,baby's daily feeding log.    Due to maternal/infant separation, the importance of milk emptying every 3 hours was reviewed until infant is able to latch and breastfeed effectively. The patient was educated on the use of a double electric hospital grade pump with initiation milk cycle. 24mm flanges.  Has Spectra, Zomee, Mom Cozy breast pumps at home. She was encouraged to do a pre-pump breast massage. Hand expression, collecting and labeling breast milk for the NICU was went over.  Also, the education of the importance of cleaning pump parts after every use and supplies provided.  All questions answered. Contact information, support, and encouragement provided.   "

## 2023-09-29 NOTE — ANESTHESIA POSTPROCEDURE EVALUATION
Patient: Denise Rod    Procedure Summary       Date: 23 Room / Location: [facility] LABOR DELIVERY 1 / [facility] LABOR DELIVERY    Anesthesia Start: 715 Anesthesia Stop: 853    Procedure: Repeat  section (Abdomen) Diagnosis:       Chronic hypertension during pregnancy      (Chronic hypertension during pregnancy [O10.919])    Surgeons: Sho Louise MD Provider: Lambert Bliss CRNA    Anesthesia Type: spinal ASA Status: 3            Anesthesia Type: spinal    Vitals  Vitals Value Taken Time   /81 23 0850   Temp     Pulse 92 23 0851   Resp     SpO2 100 % 23 0851   Vitals shown include unvalidated device data.        Post Anesthesia Care and Evaluation    Patient location during evaluation: PHASE II  Patient participation: complete - patient cannot participate  Level of consciousness: sleepy but conscious  Pain score: 0  Pain management: adequate    Airway patency: patent  Anesthetic complications: No anesthetic complications  PONV Status: none  Cardiovascular status: acceptable  Respiratory status: acceptable  Hydration status: acceptable  No anesthesia care post op

## 2023-09-29 NOTE — ANESTHESIA PROCEDURE NOTES
Spinal Block      Patient reassessed immediately prior to procedure    Patient location during procedure: OR  Start Time: 9/29/2023 7:16 AM  Stop Time: 9/29/2023 7:24 AM  Indication:at surgeon's request and procedure for pain  Performed By  CRNA/CAA: Lambert Bliss CRNA  Preanesthetic Checklist  Completed: patient identified, IV checked, site marked, risks and benefits discussed, surgical consent, monitors and equipment checked, pre-op evaluation and timeout performed  Spinal Block Prep:  Patient Position:sitting  Sterile Tech:cap, gloves, mask and sterile barriers  Prep:Betadine  Patient Monitoring:blood pressure monitoring, continuous pulse oximetry and EKG    Spinal Block Procedure  Approach:midline  Guidance:landmark technique  Location:L3-L4  Needle Type:Pencan  Needle Gauge:24 G  Placement of Spinal needle event:cerebrospinal fluid aspirated  Paresthesia: no  Fluid Appearance:clear  Medications: bupivacaine PF (MARCAINE) 0.75 % injection - Epidural, Back   1.8 mL - 9/29/2023 7:24:00 AM   Post Assessment  Patient Tolerance:patient tolerated the procedure well with no apparent complications  Complications no

## 2023-09-29 NOTE — OP NOTE
[facility]  Operative Report    Name: Denise Rod  MRN: [id number]  Date: 2023  CSN: [id number]      Location: [facility] LABOR DELIVERY    Service: Obstetrics    Pre-op Diagnosis:   IUP at 38w0d  Prior  section x1  CHTN  Class III obesity, PG BMI 53.4  GBS positive    Post-op Diagnosis: Same, in addition to delivered    Surgeon: Sho Louise MD FACOG    Assistant: Kathy Yeager CST CSFA    Staff:  Circulator: Armani Munguia RN  Scrub Person: Tammi Weaver CSA  L & D Nurse: Chau Disla RN  Assistant: Kathy Yeager CSA  Other: Silver Villanueva RN    Anesthesia: Spinal w/ Duramorph    Anesthesia Staff:  CRNA: Lambert Bliss CRNA  Student Nurse Anesthetist: Mari Gerber SRNA    Operation: Repeat low-transverse  section    Drains: Dawson catheter, draining clear yellow urine    Complications: None    Findings: Obese abdomen; subcutaneous layer approximately 5 cm deep.  Fascia minimally scarred.  Delivery of viable male  weighing 3000 grams with Apgars of 8 and 8.  Bilateral fallopian tubes and ovaries normal.      Condition: Stable    Specimens/Disposition: Placenta and umbilical cord- to pathology    Estimated Blood Loss: 700 mL  Quantitative Blood Loss: 606 mL  IV Fluids: 1300 mL crystalloid  Urine Output: 35 mL    Indications: Denise Rod, 28 y.o.,  at 38w0d presenting for  delivery secondary to CHTN.  Declined TOLAC.    Description of Operation:  The patient was identified and the procedure verified as a  section delivery.  The patient was given spinal anesthesia.  Patient prepared and draped in normal sterile fashion in dorsal supine position with a leftward tilt.  A transverse skin incision was made with the scalpel and carried down through the subcutaneous tissue to the fascia using the Bovie.  Fascial incision was made with the Bovie and extended transversely with Mayorga scissors.   The superior aspect of the fascia was grasped with Kocher clamps and the rectus muscle was dissected off bluntly and sharply with Bovie.  The inferior aspect of the fascia was then grasped with Kocher clamps and the rectus muscle and pyramidalis were dissected off bluntly and then sharply with Bovie.  The rectus muscle was then  in the midline and the peritoneum was identified and entered bluntly.  The peritoneal incision was extended transversely.  Bladder blade and retractor were inserted.  The uterovesical peritoneal reflection was identified and incised transversely with Metzenbaum scissors.  The bladder flap was bluntly freed from the lower uterine segment. The bladder blade was adjusted.  A low transverse uterine incision was made with a scalpel and the uterine incision was extended with upward traction.  The amniotic sac was ruptured with an Allis clamp.  Delivered from cephalic presentation was a live male  weighing 3000 grams with Apgar scores of 8 at one minute and 8 at five minutes.  Cord clamped and cut and infant with bulb suction were handed to awaiting staff.  Cord blood was obtained and sent.  The placenta was removed intact and appeared normal.  Uterus exteriorized and cleared of all clots and debris.  The uterus, tubes and ovaries appeared normal.  The uterine incision was closed with running locked sutures of 0-Vicryl and imbricated with 0-Vicryl.  Hemostasis achieved 2-0 Vicryl interrupted sutures.  Posterior cul-de-sac was cleared.  Uterus was reinserted atraumatically.  Hemostasis was observed.  Bilateral paracolic gutters cleared.  Prior to closure of the abdomen, bilateral transversus abdominis planus block was performed with 15 mL of 0.5% ropivacaine on each side under direct visualization.  Inspection of the abdomen and pelvis prior to abdominal wall closure revealed no evidence of retained instruments or sponges.  The fascia was then reapproximated with running sutures of  0-Vicryl.  Subcutaneous layer closed with 2-0 plain gut in multiple layers.  The skin was reapproximated with 3-0 Monocryl in subcuticular fashion.  Exofin dressing applied.  There were no intraoperative complications and patient tolerated the procedure well.  The patient was escorted to her room in stable condition.    The patient received Ancef 3 g for antibiotic prophylaxis prior to the start of the procedure.    ANGEL Esposito was responsible for performing the following activities: Retraction, Suturing, Closing, Placing Dressing, and Delivery of Fetus and their skilled assistance was necessary for the success of this case.    This document has been electronically signed by Sho Louise MD on September 29, 2023 08:36 CDT.

## 2023-09-29 NOTE — L&D DELIVERY NOTE
[facility]   Delivery Note    Patient Name: Denise Rod  : 1995  MRN: [id number]  Date of Delivery: 2023     Diagnosis     Pre & Post-Delivery:  Intrauterine pregnancy at 38w0d  Labor status:  Without Labor     Chronic hypertension during pregnancy    Supervision of high risk pregnancy in third trimester    Previous  delivery affecting pregnancy    History of gestational hypertension    Obesity affecting pregnancy in third trimester    GBS (group B Streptococcus carrier), +RV culture, currently pregnant    Single delivery by  section             Problem List    Transfer to Postpartum     Review the Delivery Report for details.     Delivery     Delivery: , Low Transverse     YOB: 2021    Time of Birth:  Gestational Age 7:55 AM   38w0d     Anesthesia: Spinal     Delivering clinician: Sho Louise    Forceps?   No   Vacuum? No    Shoulder dystocia present: No        Delivery narrative:  See operative report      Infant     Findings: male  infant     Infant observations: Weight: 3000 g (6 lb 9.8 oz)   Length: 18.504  in  Observations/Comments:        Apgars: 8  @ 1 minute /    8  @ 5 minutes   Infant Name: Kaven     Placenta & Cord         Placenta delivered  Spontaneous  at   2023  7:56 AM     Cord: 3 vessels  present.   Nuchal Cord?  no   Cord blood obtained: Yes    Cord gases obtained:  No    Cord gas results: Venous:  No results found for: PHCVEN    Arterial:  No results found for: PHCART     Repair     Episiotomy: None    No    Lacerations: No   Estimated Blood Loss: 700 mL     Quantitative Blood Loss: Quantitative Blood Loss (mL): 603 mL (in OR) (23 0851)        Complications     hypertension    Disposition     Mother to Mother Baby/Postpartum in stable condition currently.  Baby to NICU in stable condition currently.    Sho Louise MD  23  08:55 CDT

## 2023-09-30 VITALS
OXYGEN SATURATION: 100 % | WEIGHT: 293 LBS | BODY MASS INDEX: 50.02 KG/M2 | HEART RATE: 79 BPM | SYSTOLIC BLOOD PRESSURE: 145 MMHG | HEIGHT: 64 IN | DIASTOLIC BLOOD PRESSURE: 82 MMHG | RESPIRATION RATE: 16 BRPM | TEMPERATURE: 98.8 F

## 2023-09-30 LAB
HCT VFR BLD AUTO: 29.2 % (ref 34–46.6)
HGB BLD-MCNC: 9.9 G/DL (ref 12–15.9)

## 2023-09-30 PROCEDURE — 85014 HEMATOCRIT: CPT | Performed by: OBSTETRICS & GYNECOLOGY

## 2023-09-30 PROCEDURE — 25010000002 ENOXAPARIN PER 10 MG: Performed by: OBSTETRICS & GYNECOLOGY

## 2023-09-30 PROCEDURE — 85018 HEMOGLOBIN: CPT | Performed by: OBSTETRICS & GYNECOLOGY

## 2023-09-30 RX ADMIN — ENOXAPARIN SODIUM 60 MG: 100 INJECTION SUBCUTANEOUS at 23:38

## 2023-09-30 RX ADMIN — SIMETHICONE 80 MG: 80 TABLET, CHEWABLE ORAL at 07:35

## 2023-09-30 RX ADMIN — IBUPROFEN 600 MG: 600 TABLET, FILM COATED ORAL at 11:17

## 2023-09-30 RX ADMIN — PRENATAL VIT W/ FE FUMARATE-FA TAB 27-0.8 MG 1 TABLET: 27-0.8 TAB at 08:51

## 2023-09-30 RX ADMIN — IBUPROFEN 600 MG: 600 TABLET, FILM COATED ORAL at 04:19

## 2023-09-30 RX ADMIN — DOCUSATE SODIUM 100 MG: 100 CAPSULE, LIQUID FILLED ORAL at 21:34

## 2023-09-30 RX ADMIN — SIMETHICONE 80 MG: 80 TABLET, CHEWABLE ORAL at 13:18

## 2023-09-30 RX ADMIN — ENOXAPARIN SODIUM 60 MG: 100 INJECTION SUBCUTANEOUS at 13:19

## 2023-09-30 RX ADMIN — NIFEDIPINE 30 MG: 30 TABLET, FILM COATED, EXTENDED RELEASE ORAL at 08:50

## 2023-09-30 RX ADMIN — SIMETHICONE 80 MG: 80 TABLET, CHEWABLE ORAL at 18:19

## 2023-09-30 RX ADMIN — ACETAMINOPHEN 1000 MG: 500 TABLET, FILM COATED ORAL at 23:38

## 2023-09-30 RX ADMIN — SIMETHICONE 80 MG: 80 TABLET, CHEWABLE ORAL at 21:34

## 2023-09-30 RX ADMIN — IBUPROFEN 600 MG: 600 TABLET, FILM COATED ORAL at 21:34

## 2023-09-30 RX ADMIN — POLYETHYLENE GLYCOL 3350 17 G: 17 POWDER, FOR SOLUTION ORAL at 08:51

## 2023-09-30 RX ADMIN — ACETAMINOPHEN 1000 MG: 500 TABLET, FILM COATED ORAL at 18:19

## 2023-09-30 RX ADMIN — ACETAMINOPHEN 1000 MG: 500 TABLET, FILM COATED ORAL at 07:35

## 2023-09-30 RX ADMIN — DOCUSATE SODIUM 100 MG: 100 CAPSULE, LIQUID FILLED ORAL at 08:51

## 2023-09-30 NOTE — PLAN OF CARE
Problem: Adult Inpatient Plan of Care  Goal: Plan of Care Review  Outcome: Ongoing, Progressing  Flowsheets (Taken 9/30/2023 0558)  Progress: improving  Plan of Care Reviewed With: patient  Outcome Evaluation: VSS, bleeding light, FF-1, up out of bed ambulating to BR and NICU, pain well controlled with scheduled pain meds.   Goal Outcome Evaluation:  Plan of Care Reviewed With: patient        Progress: improving  Outcome Evaluation: VSS, bleeding light, FF-1, up out of bed ambulating to BR and NICU, pain well controlled with scheduled pain meds.

## 2023-09-30 NOTE — PROGRESS NOTES
[facility]   PROGRESS NOTE    Post-Op Day 1 S/P   Subjective     Patient reports:  Pain is well controlled with prescription NSAID's including Ibuprofen 600mg .  She is ambulating. Tolerating a regular diet. Voiding - without difficulty; flatus reported..  Vaginal bleeding is light.      Objective      Vitals: Vital Signs Range for the last 24 hours  Temperature: Temp:  [97.3 °F (36.3 °C)-97.6 °F (36.4 °C)] 97.6 °F (36.4 °C)   Temp Source: Temp src: Oral   BP: BP: (118-132)/(63-79) 118/63   Pulse: Heart Rate:  [] 101   Respirations: Resp:  [18] 18   SPO2: SpO2:  [97 %-98 %] 97 %   O2 Amount (l/min):     O2 Devices Device (Oxygen Therapy): room air   Weight:              Physical Exam    Lungs clear to auscultation bilaterally   Abdomen Soft, ND/NT; Fundus- NT   Incision  well approximated with clean, dry dressing in place   Extremities edema 2+ pedal     I reviewed the patient's new clinical results.    Assessment & Plan        Chronic hypertension during pregnancy    Supervision of high risk pregnancy in third trimester    Previous  delivery affecting pregnancy    History of gestational hypertension    Obesity affecting pregnancy in third trimester    GBS (group B Streptococcus carrier), +RV culture, currently pregnant    Single delivery by  section      Assessment:    Denise Rod is Day 1  post-partum    , Low Transverse     .       Plan:  Continue Post op  Care.             Anemia- will add FeSO4 bid.             Consider possible D/C home in am if patient remains stable and baby is cleared for discharge.        Bridget Vivas MD  2023  13:55 CDT

## 2023-10-01 NOTE — PLAN OF CARE
Problem: Adult Inpatient Plan of Care  Goal: Plan of Care Review  Outcome: Ongoing, Progressing  Flowsheets  Taken 9/30/2023 2302  Outcome Evaluation: VSS, FF-1, bleeding light, ambulates in room, pain well controlled with scheduled pain meds, tolerating po intake, voids and BM noted .  Taken 9/30/2023 0524  Progress: improving  Plan of Care Reviewed With: patient   Goal Outcome Evaluation:  Plan of Care Reviewed With: patient        Progress: improving  Outcome Evaluation: VSS, FF-1, bleeding light, ambulates in room, pain well controlled with scheduled pain meds, tolerating po intake, voids and BM noted .

## 2023-10-03 LAB — REF LAB TEST METHOD: NORMAL

## (undated) DEVICE — SYR LUERLOK 20CC BX/50

## (undated) DEVICE — SUT VIC 2/0 SH 27IN

## (undated) DEVICE — WOUND RETRACTOR AND PROTECTOR: Brand: ALEXIS O WOUND PROTECTOR-RETRACTOR

## (undated) DEVICE — HYDROGEL COATED LATEX URINE METER FOLEY TRAY,16 FR/CH (5.3 MM), 5 ML CATHETER PRE-CONNECTED TO 2000 ML DRAINAGE BAG WITH NEEDLE SAMPLING: Brand: DOVER

## (undated) DEVICE — SUT VIC 0/0 UR6 27IN DYED J603H

## (undated) DEVICE — PK C/SECT 60

## (undated) DEVICE — GLV SURG SENSICARE PI ORTHO SZ6.5 LF STRL

## (undated) DEVICE — PATIENT RETURN ELECTRODE, SINGLE-USE, CONTACT QUALITY MONITORING, ADULT, WITH 9FT CORD, FOR PATIENTS WEIGING OVER 33LBS. (15KG): Brand: MEGADYNE

## (undated) DEVICE — SINGLE USE 3-LUMEN EXTRACTION BALLOON V: Brand: SINGLE USE 3-LUMEN EXTRACTION BALLOON V

## (undated) DEVICE — CATH CHOLANGIOGRAM TAUT 6.5F 18IN 2.2MM

## (undated) DEVICE — STERILE POLYISOPRENE POWDER-FREE SURGICAL GLOVES WITH EMOLLIENT COATING: Brand: PROTEXIS

## (undated) DEVICE — SHEET,DRAPE,53X77,STERILE: Brand: MEDLINE

## (undated) DEVICE — BITEBLOCK ENDO W/STRAP 60F A/ LF DISP

## (undated) DEVICE — LUER-LOK 360°: Brand: CONNECTA, LUER-LOK

## (undated) DEVICE — SYS CLS SKIN PREMIERPRO EXOFINFUSION 22CM

## (undated) DEVICE — APPL CHLORAPREP HI/LITE 26ML ORNG

## (undated) DEVICE — ENDOPATH XCEL BLADELESS TROCARS WITH STABILITY SLEEVES: Brand: ENDOPATH XCEL

## (undated) DEVICE — SUT MONOCRYL 4/0 PS2 27IN Y426H ETY426H

## (undated) DEVICE — SUT  GUT PLAIN 2/0 CT1 27IN 843H

## (undated) DEVICE — SUT MNCRYL 3/0 Y936H

## (undated) DEVICE — GLV SURG SIGNATURE ESSENTIAL PF LTX SZ6.5

## (undated) DEVICE — SUT VIC 0 CT1 36IN J946H

## (undated) DEVICE — ELECTRD J/HK MEGADYNE EZCLEAN 5MM 33CM

## (undated) DEVICE — GW VISIGLIDE STRTIP .025 7X450CM

## (undated) DEVICE — KT ANTI FOG W/FLD AND SPNG

## (undated) DEVICE — Device: Brand: DISPOSABLE ELECTROSURGICAL SNARE

## (undated) DEVICE — TBG PENCL TELESCP MEGADYNE SMOKE EVAC 10FT

## (undated) DEVICE — TRAXI PANNICULUS RETRACTOR WITH RETENTUS TECHNOLOGY (BMI 30-50): Brand: TRAXI® PANNICULUS RETRACTOR

## (undated) DEVICE — TRAP FLD MINIVAC MEGADYNE 100ML

## (undated) DEVICE — GARMENT,MEDLINE,DVT,INT,CALF,MED, GEN2: Brand: MEDLINE

## (undated) DEVICE — Device: Brand: DISPOSABLE TRIPLE LUMEN SPHINCTEROTOME

## (undated) DEVICE — PK LAP CHOLE LF 60

## (undated) DEVICE — PAD GRND REM POLYHESIVE A/ DISP

## (undated) DEVICE — CVR SURG EQUIP BND RECTG 36X28

## (undated) DEVICE — GLV SURG SENSICARE PI PF LF 7 GRN STRL

## (undated) DEVICE — SUT VIC 0 CTX 36IN J978H

## (undated) DEVICE — NANOLINECORNERSTONESPROXIMAL END: ISO 80369-7: Brand: SONOPLEX

## (undated) DEVICE — GLV SURG SIGNATURE ESSENTIAL PF LTX SZ7.5

## (undated) DEVICE — SOL IRRIG NACL 1000ML

## (undated) DEVICE — GOWN,PREVENTION PLUS,XLNG/XXLARGE,STRL: Brand: MEDLINE

## (undated) DEVICE — TRY SPINE PENCAN 24GA X4IN

## (undated) DEVICE — TROCAR: Brand: KII FIOS FIRST ENTRY

## (undated) DEVICE — SINGLE USE CANNULA: Brand: SINGLE USE CANNULA

## (undated) DEVICE — Device

## (undated) DEVICE — ADHS SKIN PREMIERPRO EXOFIN TOPICAL HI/VISC .5ML

## (undated) DEVICE — MONOPOLAR METZENBAUM SCISSOR TIP, DISPOSABLE: Brand: MONOPOLAR METZENBAUM SCISSOR TIP, DISPOSABLE

## (undated) DEVICE — PENCL ES MEGADINE EZ/CLEAN BUTN W/HOLSTR 10FT

## (undated) DEVICE — KIWI® VACUUM DELIVERY SYSTEM, OMNI-C CUP® FOR CESAREAN SECTION: Brand: KIWI® OMNI-C CUP®

## (undated) DEVICE — CORE TRUMPET FOR SINGLE SOLUTION BAG: Brand: CORE DYNAMICS

## (undated) DEVICE — DRP SURG UNIV BASIC BILAMINATE 53X77IN DISP